# Patient Record
Sex: FEMALE | Race: WHITE | Employment: OTHER | URBAN - METROPOLITAN AREA
[De-identification: names, ages, dates, MRNs, and addresses within clinical notes are randomized per-mention and may not be internally consistent; named-entity substitution may affect disease eponyms.]

---

## 2017-11-29 ENCOUNTER — IP HISTORICAL/CONVERTED ENCOUNTER (OUTPATIENT)
Dept: OTHER | Age: 82
End: 2017-11-29

## 2018-02-13 ENCOUNTER — OP HISTORICAL/CONVERTED ENCOUNTER (OUTPATIENT)
Dept: OTHER | Age: 83
End: 2018-02-13

## 2018-08-30 ENCOUNTER — OP HISTORICAL/CONVERTED ENCOUNTER (OUTPATIENT)
Dept: OTHER | Age: 83
End: 2018-08-30

## 2018-09-21 ENCOUNTER — OP HISTORICAL/CONVERTED ENCOUNTER (OUTPATIENT)
Dept: OTHER | Age: 83
End: 2018-09-21

## 2018-10-27 ENCOUNTER — ED HISTORICAL/CONVERTED ENCOUNTER (OUTPATIENT)
Dept: OTHER | Age: 83
End: 2018-10-27

## 2019-08-27 ENCOUNTER — OP HISTORICAL/CONVERTED ENCOUNTER (OUTPATIENT)
Dept: OTHER | Age: 84
End: 2019-08-27

## 2019-11-25 ENCOUNTER — ED HISTORICAL/CONVERTED ENCOUNTER (OUTPATIENT)
Dept: OTHER | Age: 84
End: 2019-11-25

## 2021-04-20 ENCOUNTER — HOSPITAL ENCOUNTER (EMERGENCY)
Age: 86
Discharge: HOME | End: 2021-04-20
Attending: EMERGENCY MEDICINE
Payer: MEDICARE

## 2021-04-20 ENCOUNTER — APPOINTMENT (OUTPATIENT)
Dept: CT IMAGING | Age: 86
End: 2021-04-20
Attending: EMERGENCY MEDICINE
Payer: MEDICARE

## 2021-04-20 VITALS
SYSTOLIC BLOOD PRESSURE: 160 MMHG | WEIGHT: 120 LBS | DIASTOLIC BLOOD PRESSURE: 78 MMHG | TEMPERATURE: 97.7 F | RESPIRATION RATE: 16 BRPM | HEART RATE: 83 BPM | BODY MASS INDEX: 22.08 KG/M2 | HEIGHT: 62 IN | OXYGEN SATURATION: 99 %

## 2021-04-20 DIAGNOSIS — S09.90XA CLOSED HEAD INJURY, INITIAL ENCOUNTER: ICD-10-CM

## 2021-04-20 DIAGNOSIS — T07.XXXA ABRASIONS OF MULTIPLE SITES: ICD-10-CM

## 2021-04-20 DIAGNOSIS — T07.XXXA MULTIPLE CONTUSIONS: Primary | ICD-10-CM

## 2021-04-20 PROCEDURE — 90471 IMMUNIZATION ADMIN: CPT

## 2021-04-20 PROCEDURE — 70486 CT MAXILLOFACIAL W/O DYE: CPT

## 2021-04-20 PROCEDURE — 90715 TDAP VACCINE 7 YRS/> IM: CPT | Performed by: EMERGENCY MEDICINE

## 2021-04-20 PROCEDURE — 74011250637 HC RX REV CODE- 250/637: Performed by: EMERGENCY MEDICINE

## 2021-04-20 PROCEDURE — 99283 EMERGENCY DEPT VISIT LOW MDM: CPT

## 2021-04-20 PROCEDURE — 70450 CT HEAD/BRAIN W/O DYE: CPT

## 2021-04-20 PROCEDURE — 74011000250 HC RX REV CODE- 250: Performed by: EMERGENCY MEDICINE

## 2021-04-20 PROCEDURE — 74011250636 HC RX REV CODE- 250/636: Performed by: EMERGENCY MEDICINE

## 2021-04-20 RX ORDER — IBUPROFEN 400 MG/1
400 TABLET ORAL
Status: COMPLETED | OUTPATIENT
Start: 2021-04-21 | End: 2021-04-20

## 2021-04-20 RX ORDER — BACITRACIN 500 [USP'U]/G
OINTMENT TOPICAL
Status: COMPLETED | OUTPATIENT
Start: 2021-04-20 | End: 2021-04-20

## 2021-04-20 RX ADMIN — IBUPROFEN 400 MG: 400 TABLET, FILM COATED ORAL at 23:21

## 2021-04-20 RX ADMIN — TETANUS TOXOID, REDUCED DIPHTHERIA TOXOID AND ACELLULAR PERTUSSIS VACCINE, ADSORBED 0.5 ML: 5; 2.5; 8; 8; 2.5 SUSPENSION INTRAMUSCULAR at 22:52

## 2021-04-20 RX ADMIN — BACITRACIN: 500 OINTMENT TOPICAL at 23:21

## 2021-04-21 NOTE — DISCHARGE INSTRUCTIONS
Ibuprofen 400 mg every 8 hours as needed for pain. Clean wounds daily and apply antibiotic ointment.

## 2021-04-21 NOTE — ED TRIAGE NOTES
Pt states tripped over her shoes about 30 minutes ago and fell on her face. Pt has facial lacs on forehead and nose.  Pt denies LOC

## 2021-04-21 NOTE — ED PROVIDER NOTES
EMERGENCY DEPARTMENT HISTORY AND PHYSICAL EXAM      Date: 4/20/2021  Patient Name: Brandy Pritchett    History of Presenting Illness     Chief Complaint   Patient presents with   [facility] Laceration    Facial Laceration       History Provided By: Patient    HPI: Brandy Pritchett, 80 y.o. female with a past medical history significant hypertension, hyperlipidemia and hypothyroid presents to the ED with cc of Facial pain,swelling and bleeding after she fell at home  this evening onto pavement, approx 30 minutes PTA. She  Denies any LOC or  Any presyncopal sx. States she tripped due to ill fitting shoes. NO  Visual change,  Dizziness, neck or  Back pain. PCP: Ronald Martino MD    No current facility-administered medications on file prior to encounter. No current outpatient medications on file prior to encounter. Past History     Past Medical History: unsure  Of last tetanus booster date  Past Medical History:   Diagnosis Date    High cholesterol     Hypertension     Hypothyroidism        Past Surgical History:  Past Surgical History:   Procedure Laterality Date    HX BREAST LUMPECTOMY      HX HYSTERECTOMY         Family History:  History reviewed. No pertinent family history. Social History:  Social History     Tobacco Use    Smoking status: Former Smoker    Smokeless tobacco: Never Used   Substance Use Topics    Alcohol use: Not Currently    Drug use: Never       Allergies:  No Known Allergies      Review of Systems   Review of Systems   Eyes: Negative for pain. Cardiovascular: Negative for chest pain. Gastrointestinal: Negative for abdominal pain. Musculoskeletal: Negative for back pain, gait problem and neck pain. Neurological: Negative for dizziness, weakness and numbness. All other systems reviewed and are negative. Physical Exam   Physical Exam  Vitals signs and nursing note reviewed. Constitutional:       Appearance: She is well-developed.    HENT:      Head: Normocephalic. Right Ear: Tympanic membrane normal.      Left Ear: Tympanic membrane normal.      Nose:      Comments: Edema, superficial laceration on nasal  Bridge. Mouth/Throat:      Mouth: Mucous membranes are moist.      Pharynx: Oropharynx is clear. Eyes:      General: No scleral icterus. Extraocular Movements: Extraocular movements intact. Pupils: Pupils are equal, round, and reactive to light. Neck:      Musculoskeletal: Normal range of motion. No muscular tenderness. Comments: No midline  Spinal tenderness. Cardiovascular:      Rate and Rhythm: Normal rate and regular rhythm. Heart sounds: Normal heart sounds. Pulmonary:      Effort: Pulmonary effort is normal.      Breath sounds: Normal breath sounds. No wheezing, rhonchi or rales. Abdominal:      General: Bowel sounds are normal. There is no distension. Palpations: Abdomen is soft. Tenderness: There is no abdominal tenderness. There is no right CVA tenderness or left CVA tenderness. Musculoskeletal:         General: Swelling present. No deformity. Comments: No midline spinal tenderness. Edema and contusion to  Medial left knee but good ROM and nl weight bearing. Skin:     General: Skin is warm and dry. Capillary Refill: Capillary refill takes less than 2 seconds. Findings: No rash. Comments: Abrasions to forehead, nasal bridge,  Lateral right wrist.     Neurological:      General: No focal deficit present. Mental Status: She is alert and oriented to person, place, and time. Comments: No focal or lateralizing deficits. Psychiatric:         Mood and Affect: Mood normal.         Behavior: Behavior normal.         Diagnostic Study Results     Labs -   No results found for this or any previous visit (from the past 12 hour(s)). Radiologic Studies -   CT MAXILLOFACIAL WO CONT   Final Result      Subtle nasal fractures of indeterminate age, may be old. Dental disease. Follow-up as clinically indicated. CT HEAD WO CONT   Final Result   No acute intracranial findings. There is a small scalp hematoma involving the frontal region. No skull fracture. No intracranial hemorrhage. CT Results  (Last 48 hours)               04/20/21 2205  CT MAXILLOFACIAL WO CONT Final result    Impression:      Subtle nasal fractures of indeterminate age, may be old. Dental disease. Follow-up as clinically indicated. Narrative:  CT maxillofacial without contrast       Dose reduction: All CT scans at this facility are performed using dose reduction   optimization techniques as appropriate to a performed exam including the   following: Automated exposure control, adjustments of the mA and/or kV according   to patient size, or use of iterative reconstruction technique. Indication: Facial injury       Findings: There are artifacts from dental work. Subtle nasal fractures of indeterminate   age. No fluid in the paranasal sinuses. Bony orbits are intact. Temporomandibular joints are maintained. Degenerative changes cervical spine. Periapical infection/abscess involving a few teeth. Dental injuries would be   difficult to exclude on this study. Small right anterior frontal scalp   hematoma/laceration. Please note that this is not the optimal study for   evaluation of soft tissues. 04/20/21 2203  CT HEAD WO CONT Final result    Impression:  No acute intracranial findings. There is a small scalp hematoma involving the frontal region. No skull fracture. No intracranial hemorrhage. Narrative:  Noncontrast CT of brain :  Axial images with multiplanar reformats.        Dose Reduction:  All CT scans at this facility are performed using dose   reduction optimization techniques as appropriate to a performed exam including   the following: Automated exposure control, adjustments of the mA and/or kV   according to patient size, or use of iterative reconstruction technique. Comparison:  No priors are available. FINDINGS:   There is a small scalp hematoma in the frontal region. The posterior fossa is   normal, with normal shape and size of the fourth ventricle, the cerebellum and   brainstem. Supratentorially, the lateral and third ventricles are normal. The   extra-axial CSF spaces are normal for age. There is normal gray-white matter   differentiation. There is no evidence for midline shift, intracranial masses or   hemorrhage. No definite evidence for acute infarct. There is mild   periventricular white matter disease present indicating chronic small vessel   ischemia. No fractures. Clear sinuses. CXR Results  (Last 48 hours)    None            Medical Decision Making   I am the first provider for this patient. I reviewed the vital signs, available nursing notes, past medical history, past surgical history, family history and social history. Vital Signs-Reviewed the patient's vital signs. Patient Vitals for the past 12 hrs:   Temp Pulse Resp BP SpO2   04/20/21 2135 97.7 °F (36.5 °C) 83 16 (!) 160/78 99 %       Records Reviewed: Nursing Notes and Old Medical Records    Provider Notes (Medical Decision Making):   Diff Dx: contusion vs fracture vs ICH      ED Course:   Initial assessment performed. The patients presenting problems have been discussed, and they are in agreement with the care plan formulated and outlined with them. I have encouraged them to ask questions as they arise throughout their visit. CT scans of head, facial bones above. Significant only for contusions, likely prior nasal fx. Pt states she had similar fall one year ago bu no medical evaluation d/t Covid-19 Pandemic. Tetanus booster and ibuprofen given. Discussed wound care and pain mgmnt with otc meds,  PLAN:  1. There are no discharge medications for this patient.     2.   Follow-up Information    None       Return to ED if worse     Diagnosis     Clinical Impression:   1. Multiple contusions    2. Abrasions of multiple sites    3.  Closed head injury, initial encounter

## 2022-01-14 ENCOUNTER — TRANSCRIBE ORDER (OUTPATIENT)
Dept: SCHEDULING | Age: 87
End: 2022-01-14

## 2022-01-14 DIAGNOSIS — E04.1 THYROID NODULE: Primary | ICD-10-CM

## 2022-01-14 DIAGNOSIS — D58.0: ICD-10-CM

## 2022-04-12 ENCOUNTER — HOSPITAL ENCOUNTER (EMERGENCY)
Age: 87
Discharge: HOME | End: 2022-04-13
Payer: MEDICARE

## 2022-04-12 ENCOUNTER — APPOINTMENT (OUTPATIENT)
Dept: CT IMAGING | Age: 87
End: 2022-04-12
Attending: EMERGENCY MEDICINE
Payer: MEDICARE

## 2022-04-12 DIAGNOSIS — S01.01XA LACERATION OF SCALP, INITIAL ENCOUNTER: ICD-10-CM
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
## 2022-04-12 DIAGNOSIS — S09.90XA MINOR HEAD INJURY, INITIAL ENCOUNTER: Primary | ICD-10-CM

## 2022-04-12 LAB
ALBUMIN SERPL-MCNC: 4 G/DL (ref 3.5–5)
ALBUMIN/GLOB SERPL: 1.5 {RATIO} (ref 1.1–2.2)
ALP SERPL-CCNC: 169 U/L (ref 45–117)
ALT SERPL-CCNC: 33 U/L (ref 12–78)
ANION GAP SERPL CALC-SCNC: 3 MMOL/L (ref 5–15)
APPEARANCE UR: CLEAR
AST SERPL W P-5'-P-CCNC: 33 U/L (ref 15–37)
BACTERIA URNS QL MICRO: NEGATIVE /HPF
BASOPHILS # BLD: 0 K/UL (ref 0–0.1)
BASOPHILS NFR BLD: 0 % (ref 0–1)
BILIRUB SERPL-MCNC: 0.2 MG/DL (ref 0.2–1)
BILIRUB UR QL: NEGATIVE
BUN SERPL-MCNC: 21 MG/DL (ref 6–20)
BUN/CREAT SERPL: 29 (ref 12–20)
CA-I BLD-MCNC: 9.6 MG/DL (ref 8.5–10.1)
CHLORIDE SERPL-SCNC: 110 MMOL/L (ref 97–108)
CO2 SERPL-SCNC: 27 MMOL/L (ref 21–32)
COLOR UR: NORMAL
CREAT SERPL-MCNC: 0.72 MG/DL (ref 0.55–1.02)
DIFFERENTIAL METHOD BLD: NORMAL
EOSINOPHIL # BLD: 0.1 K/UL (ref 0–0.4)
EOSINOPHIL NFR BLD: 1 % (ref 0–7)
ERYTHROCYTE [DISTWIDTH] IN BLOOD BY AUTOMATED COUNT: 14.3 % (ref 11.5–14.5)
GLOBULIN SER CALC-MCNC: 2.7 G/DL (ref 2–4)
GLUCOSE SERPL-MCNC: 131 MG/DL (ref 65–100)
GLUCOSE UR STRIP.AUTO-MCNC: NEGATIVE MG/DL
HCT VFR BLD AUTO: 41.8 % (ref 35–47)
HGB BLD-MCNC: 13.3 G/DL (ref 11.5–16)
HGB UR QL STRIP: NEGATIVE
IMM GRANULOCYTES # BLD AUTO: 0 K/UL (ref 0–0.04)
IMM GRANULOCYTES NFR BLD AUTO: 0 % (ref 0–0.5)
KETONES UR QL STRIP.AUTO: NEGATIVE MG/DL
LEUKOCYTE ESTERASE UR QL STRIP.AUTO: NEGATIVE
LYMPHOCYTES # BLD: 1.4 K/UL (ref 0.8–3.5)
LYMPHOCYTES NFR BLD: 20 % (ref 12–49)
MCH RBC QN AUTO: 29.5 PG (ref 26–34)
MCHC RBC AUTO-ENTMCNC: 31.8 G/DL (ref 30–36.5)
MCV RBC AUTO: 92.7 FL (ref 80–99)
MONOCYTES # BLD: 0.6 K/UL (ref 0–1)
MONOCYTES NFR BLD: 8 % (ref 5–13)
NEUTS SEG # BLD: 4.9 K/UL (ref 1.8–8)
NEUTS SEG NFR BLD: 71 % (ref 32–75)
NITRITE UR QL STRIP.AUTO: NEGATIVE
NRBC # BLD: 0 K/UL (ref 0–0.01)
NRBC BLD-RTO: 0 PER 100 WBC
PH UR STRIP: 5 [PH] (ref 5–8)
PLATELET # BLD AUTO: 209 K/UL (ref 150–400)
PMV BLD AUTO: 10.5 FL (ref 8.9–12.9)
POTASSIUM SERPL-SCNC: 4.2 MMOL/L (ref 3.5–5.1)
PROT SERPL-MCNC: 6.7 G/DL (ref 6.4–8.2)
PROT UR STRIP-MCNC: NEGATIVE MG/DL
RBC # BLD AUTO: 4.51 M/UL (ref 3.8–5.2)
RBC #/AREA URNS HPF: NORMAL /HPF (ref 0–5)
SODIUM SERPL-SCNC: 140 MMOL/L (ref 136–145)
SP GR UR REFRACTOMETRY: 1.01 (ref 1–1.03)
TSH SERPL DL<=0.05 MIU/L-ACNC: 0.54 UIU/ML (ref 0.36–3.74)
UA: UC IF INDICATED,UAUC: NORMAL
UROBILINOGEN UR QL STRIP.AUTO: 0.1 EU/DL (ref 0.1–1)
WBC # BLD AUTO: 7 K/UL (ref 3.6–11)
WBC URNS QL MICRO: NORMAL /HPF (ref 0–4)

## 2022-04-12 PROCEDURE — 99284 EMERGENCY DEPT VISIT MOD MDM: CPT

## 2022-04-12 PROCEDURE — 75810000293 HC SIMP/SUPERF WND  RPR

## 2022-04-12 PROCEDURE — 90715 TDAP VACCINE 7 YRS/> IM: CPT | Performed by: NURSE PRACTITIONER

## 2022-04-12 PROCEDURE — 74011250636 HC RX REV CODE- 250/636: Performed by: NURSE PRACTITIONER

## 2022-04-12 PROCEDURE — 84443 ASSAY THYROID STIM HORMONE: CPT

## 2022-04-12 PROCEDURE — 75810000275 HC EMERGENCY DEPT VISIT NO LEVEL OF CARE

## 2022-04-12 PROCEDURE — 80053 COMPREHEN METABOLIC PANEL: CPT

## 2022-04-12 PROCEDURE — 70450 CT HEAD/BRAIN W/O DYE: CPT

## 2022-04-12 PROCEDURE — 93005 ELECTROCARDIOGRAM TRACING: CPT

## 2022-04-12 PROCEDURE — 36415 COLL VENOUS BLD VENIPUNCTURE: CPT

## 2022-04-12 PROCEDURE — 85025 COMPLETE CBC W/AUTO DIFF WBC: CPT

## 2022-04-12 PROCEDURE — 90471 IMMUNIZATION ADMIN: CPT

## 2022-04-12 PROCEDURE — 81001 URINALYSIS AUTO W/SCOPE: CPT

## 2022-04-12 PROCEDURE — 74011250637 HC RX REV CODE- 250/637: Performed by: NURSE PRACTITIONER

## 2022-04-12 RX ORDER — ACETAMINOPHEN 325 MG/1
650 TABLET ORAL
Status: COMPLETED | OUTPATIENT
Start: 2022-04-12 | End: 2022-04-12

## 2022-04-12 RX ORDER — TRAMADOL HYDROCHLORIDE 50 MG/1
50 TABLET ORAL
Status: COMPLETED | OUTPATIENT
Start: 2022-04-12 | End: 2022-04-12

## 2022-04-12 RX ORDER — TIMOLOL MALEATE 5 MG/ML
1 SOLUTION/ DROPS OPHTHALMIC DAILY
COMMUNITY

## 2022-04-12 RX ORDER — ACETAMINOPHEN 500 MG
2000 TABLET ORAL DAILY
COMMUNITY

## 2022-04-12 RX ORDER — ATORVASTATIN CALCIUM 20 MG/1
20 TABLET, FILM COATED ORAL DAILY
COMMUNITY
Start: 2022-01-29

## 2022-04-12 RX ORDER — AMLODIPINE BESYLATE 5 MG/1
5 TABLET ORAL DAILY
COMMUNITY
Start: 2022-04-10

## 2022-04-12 RX ORDER — BACITRACIN ZINC 500 [USP'U]/G
1 CREAM TOPICAL
Status: DISCONTINUED | OUTPATIENT
Start: 2022-04-12 | End: 2022-04-13 | Stop reason: HOSPADM

## 2022-04-12 RX ORDER — LATANOPROST 50 UG/ML
1 SOLUTION/ DROPS OPHTHALMIC
COMMUNITY
Start: 2022-02-24

## 2022-04-12 RX ORDER — RAMIPRIL 1.25 MG/1
1.25 CAPSULE ORAL DAILY
COMMUNITY
Start: 2022-01-26

## 2022-04-12 RX ORDER — LEVOTHYROXINE SODIUM 150 MCG
150 TABLET ORAL
COMMUNITY
Start: 2022-03-07

## 2022-04-12 RX ORDER — LANOLIN ALCOHOL/MO/W.PET/CERES
1000 CREAM (GRAM) TOPICAL DAILY
COMMUNITY

## 2022-04-12 RX ADMIN — SODIUM CHLORIDE 500 ML: 9 INJECTION, SOLUTION INTRAVENOUS at 21:24

## 2022-04-12 RX ADMIN — TETANUS TOXOID, REDUCED DIPHTHERIA TOXOID AND ACELLULAR PERTUSSIS VACCINE, ADSORBED 0.5 ML: 5; 2.5; 8; 8; 2.5 SUSPENSION INTRAMUSCULAR at 20:10

## 2022-04-12 RX ADMIN — TRAMADOL HYDROCHLORIDE 50 MG: 50 TABLET ORAL at 22:10

## 2022-04-12 RX ADMIN — ACETAMINOPHEN 650 MG: 325 TABLET ORAL at 20:09

## 2022-04-12 NOTE — ED TRIAGE NOTES
[facility] arrives with patient with head lac after a fall from home  Patient denies LOC and is not on blood thinners. She lives alone at home and states she does not know why she fell.    Pt is alert and oriented upon arrival and denies any headache, nausea or vomiting post fall/head lac

## 2022-04-13 VITALS
HEIGHT: 61 IN | RESPIRATION RATE: 16 BRPM | DIASTOLIC BLOOD PRESSURE: 72 MMHG | WEIGHT: 119.93 LBS | SYSTOLIC BLOOD PRESSURE: 120 MMHG | BODY MASS INDEX: 22.64 KG/M2 | HEART RATE: 70 BPM | TEMPERATURE: 98.7 F | OXYGEN SATURATION: 100 %

## 2022-04-13 LAB
ATRIAL RATE: 71 BPM
CALCULATED P AXIS, ECG09: 52 DEGREES
CALCULATED R AXIS, ECG10: 42 DEGREES
CALCULATED T AXIS, ECG11: 51 DEGREES
DIAGNOSIS, 93000: NORMAL
P-R INTERVAL, ECG05: 152 MS
Q-T INTERVAL, ECG07: 398 MS
QRS DURATION, ECG06: 68 MS
QTC CALCULATION (BEZET), ECG08: 432 MS
VENTRICULAR RATE, ECG03: 71 BPM

## 2022-04-13 NOTE — ED PROVIDER NOTES
EMERGENCY DEPARTMENT HISTORY AND PHYSICAL EXAM      Date: 4/12/2022  Patient Name: Mao Patterson    History of Presenting Illness     Chief Complaint   Patient presents with    Head Injury       History Provided By: Patient    HPI: Mao Patterson, 80 y.o. female with a past medical history significant hypertension, hyperlipidemia and hypothyroidism presents to the ED with cc of fall. Patient states she was moving things around at her house preparing for a gathering tomorrow. She states the last thing she remembers is being upstairs. She states she thinks she walked across the street to get some help from her neighbor but is unsure. She presents with a large laceration to her posterior scalp. She reports feeling more fatigued than normal recently but denies any recent illness, specifically denies any fevers or NV/D. She denies any headache, dizziness or visual changes presently. She denies any neck pain or any other injuries. Last Tdap unknown. There are no other complaints, changes, or physical findings at this time. PCP: Chris Royal MD    No current facility-administered medications on file prior to encounter. Current Outpatient Medications on File Prior to Encounter   Medication Sig Dispense Refill    amLODIPine (NORVASC) 5 mg tablet Take 5 mg by mouth daily.  atorvastatin (LIPITOR) 20 mg tablet Take 20 mg by mouth daily.  ramipriL (ALTACE) 1.25 mg capsule Take 1.25 mg by mouth daily.  latanoprost (XALATAN) 0.005 % ophthalmic solution Administer 1 Drop to both eyes nightly.  timolol (TIMOPTIC) 0.5 % ophthalmic solution Administer 1 Drop to both eyes daily.  Synthroid 150 mcg tablet Take 150 mcg by mouth every morning.  cyanocobalamin 1,000 mcg tablet Take 1,000 mcg by mouth daily.  cholecalciferol (VITAMIN D3) (2,000 UNITS /50 MCG) cap capsule Take 2,000 Units by mouth daily.          Past History     Past Medical History:  Past Medical History:   Diagnosis Date    High cholesterol     Hypertension     Hypothyroidism        Past Surgical History:  Past Surgical History:   Procedure Laterality Date    HX BREAST LUMPECTOMY      HX HYSTERECTOMY         Family History:  No family history on file. Social History:  Social History     Tobacco Use    Smoking status: Former Smoker    Smokeless tobacco: Never Used   Substance Use Topics    Alcohol use: Not Currently    Drug use: Never       Allergies:  No Known Allergies      Review of Systems     Review of Systems   Unable to perform ROS: Mental status change       Physical Exam     Physical Exam  Vitals and nursing note reviewed. Constitutional:       General: She is not in acute distress. Appearance: Normal appearance. HENT:      Head: Normocephalic. Laceration present. Jaw: There is normal jaw occlusion. Comments: Large gaping laceration posterior scalp, small linear laceration right scalp  Eyes:      General: Vision grossly intact. Extraocular Movements: Extraocular movements intact. Conjunctiva/sclera: Conjunctivae normal.      Pupils: Pupils are equal, round, and reactive to light. Cardiovascular:      Rate and Rhythm: Normal rate and regular rhythm. Heart sounds: Normal heart sounds. Pulmonary:      Effort: Pulmonary effort is normal. No tachypnea or accessory muscle usage. Breath sounds: Decreased breath sounds present. No wheezing or rales. Chest:      Chest wall: No tenderness or crepitus. Abdominal:      General: Bowel sounds are normal.      Palpations: Abdomen is soft. Tenderness: There is no abdominal tenderness. Musculoskeletal:         General: Normal range of motion. Cervical back: Normal range of motion and neck supple. No rigidity or crepitus. No pain with movement or spinous process tenderness. Normal range of motion. Skin:     General: Skin is warm and dry. Neurological:      General: No focal deficit present.       Mental Status: She is alert. GCS: GCS eye subscore is 4. GCS verbal subscore is 5. GCS motor subscore is 6. Cranial Nerves: Cranial nerves are intact. Sensory: Sensation is intact. Motor: Motor function is intact. Coordination: Coordination is intact. Gait: Gait is intact. Psychiatric:         Mood and Affect: Mood is anxious. Behavior: Behavior normal. Behavior is cooperative. Lab and Diagnostic Study Results     Labs -     Admission on 04/12/2022, Discharged on 04/13/2022   Component Date Value    WBC 04/12/2022 7.0     RBC 04/12/2022 4.51     HGB 04/12/2022 13.3     HCT 04/12/2022 41.8     MCV 04/12/2022 92.7     MCH 04/12/2022 29.5     MCHC 04/12/2022 31.8     RDW 04/12/2022 14.3     PLATELET 23/42/7863 173     MPV 04/12/2022 10.5     NRBC 04/12/2022 0.0     ABSOLUTE NRBC 04/12/2022 0.00     NEUTROPHILS 04/12/2022 71     LYMPHOCYTES 04/12/2022 20     MONOCYTES 04/12/2022 8     EOSINOPHILS 04/12/2022 1     BASOPHILS 04/12/2022 0     IMMATURE GRANULOCYTES 04/12/2022 0     ABS. NEUTROPHILS 04/12/2022 4.9     ABS. LYMPHOCYTES 04/12/2022 1.4     ABS. MONOCYTES 04/12/2022 0.6     ABS. EOSINOPHILS 04/12/2022 0.1     ABS. BASOPHILS 04/12/2022 0.0     ABS. IMM. GRANS. 04/12/2022 0.0     DF 04/12/2022 AUTOMATED     Sodium 04/12/2022 140     Potassium 04/12/2022 4.2     Chloride 04/12/2022 110*    CO2 04/12/2022 27     Anion gap 04/12/2022 3*    Glucose 04/12/2022 131*    BUN 04/12/2022 21*    Creatinine 04/12/2022 0.72     BUN/Creatinine ratio 04/12/2022 29*    GFR est AA 04/12/2022 >60     GFR est non-AA 04/12/2022 >60     Calcium 04/12/2022 9.6     Bilirubin, total 04/12/2022 0.2     AST (SGOT) 04/12/2022 33     ALT (SGPT) 04/12/2022 33     Alk.  phosphatase 04/12/2022 169*    Protein, total 04/12/2022 6.7     Albumin 04/12/2022 4.0     Globulin 04/12/2022 2.7     A-G Ratio 04/12/2022 1.5     Color 04/12/2022 Yellow/Straw     Appearance 04/12/2022 Clear     Specific gravity 04/12/2022 1.013     pH (UA) 04/12/2022 5.0     Protein 04/12/2022 Negative     Glucose 04/12/2022 Negative     Ketone 04/12/2022 Negative     Bilirubin 04/12/2022 Negative     Blood 04/12/2022 Negative     Urobilinogen 04/12/2022 0.1     Nitrites 04/12/2022 Negative     Leukocyte Esterase 04/12/2022 Negative     UA:UC IF INDICATED 04/12/2022 Culture not indicated by UA result     WBC 04/12/2022 0-4     RBC 04/12/2022 0-5     Bacteria 04/12/2022 Negative     TSH 04/12/2022 0.54     Ventricular Rate 04/12/2022 71     Atrial Rate 04/12/2022 71     P-R Interval 04/12/2022 152     QRS Duration 04/12/2022 68     Q-T Interval 04/12/2022 398     QTC Calculation (Bezet) 04/12/2022 432     Calculated P Axis 04/12/2022 52     Calculated R Axis 04/12/2022 42     Calculated T Axis 04/12/2022 51     Diagnosis 04/12/2022                      Value:Sinus rhythm with Premature atrial complexes  Nonspecific ST abnormality  Abnormal ECG  No previous ECGs available  Confirmed by BG RAMIREZ, Volodymyr Dontrell ([id number]) on 4/13/2022 8:43:28 AM           Radiologic Studies -   CT Results (most recent):  Results from Hospital Encounter encounter on 04/12/22    CT HEAD WO CONT    Narrative  The study is a noncontrasted head CT examination dated 4/12/2022. HISTORY: Injury. TECHNIQUE: Thin section axial imaging was performed followed by sagittal and  coronal reconstructed imaging. Dose Reduction Technique was employed to reduce radiation exposure - This  includes reduction optimization techniques as appropriate to a performed exam  with automated exposure control adjustments of the mA and/or Kv according to  patient size, or use of iterative reconstruction technique. COMPARISON: None. The ventricles are normal in size with a midline position. This examination is  negative for intracranial hemorrhage, mass effect or an extra axial collection.   The gray-white matter junctions are preserved without cytotoxic or vasogenic  edema. There are age-appropriate involutional changes. An assessment of the white matter tracts demonstrates a mild decrease in the  central periventricular white matter consistent with aging/mild microvascular  changes. ORBITS: The patient has undergone previous cataract surgery. This examination is  negative for acute orbital pathology. PARANASAL SINUSES: The paranasal sinuses are well pneumatized without acute  sinus disease. The mastoid air cells and middle ear cavities image in a normal fashion. The  sella and the suprasellar regions are unremarkable. The craniocervical junction  images in a normal fashion. OTHER: The bony calvarium is intact. Impression  The CT evaluation is within normal limits for the patient's age with  age-appropriate involutional changes and mild microvascular disease. This  examination is negative for intracranial hemorrhage, an extra-axial collection,  or cerebral edema. The bony calvarium images in a normal fashion. Medical Decision Making   - I am the first provider for this patient. - I reviewed the vital signs, available nursing notes, past medical history, past surgical history, family history and social history. - Initial assessment performed. The patients presenting problems have been discussed, and they are in agreement with the care plan formulated and outlined with them. I have encouraged them to ask questions as they arise throughout their visit. Vital Signs-Reviewed the patient's vital signs. No data found. Records Reviewed: Nursing Notes    The patient presents with head injury with a differential diagnosis of concussion, subarachnoid hemorrhage,   Epidural/subdural hematoma, skull fracture, diffuse axonal injury      ED Course:   Patient presents with scalp laceration after unwitnessed fall at home. Patient unsure of details surrounding event. She denies recent illness.   Patient is A&O upon arrival, no focal deficits. Patient is nontoxic-appearing, vital signs normal.  CT head without acute findings. Labs unremarkable. EKG normal sinus rhythm with ischemia. Scalp laceration cleaned and repaired without difficulty. Patient is adamant about going home and has a friend at bedside who is able to help patient and look after her. Patient is able to ambulate without difficulty at time of discharge. ED Course as of 04/18/22 2213   Tue Apr 12, 2022 2113 Patient updated on results and plan of care thus far [LP]   2145 Patient updated on results and plan of care. She is anxious to go home and adamant that she is safe and capable. She has a friend at the bedside who states she will assist her at home and monitor. [LP]      ED Course User Index  [LP] Craig Rendon NP       Provider Notes (Medical Decision Making):     MDM  Number of Diagnoses or Management Options  Fall, initial encounter: new, needed workup  Laceration of scalp, initial encounter: new, no workup  Minor head injury, initial encounter: new, needed workup     Amount and/or Complexity of Data Reviewed  Clinical lab tests: ordered and reviewed  Tests in the radiology section of CPT®: ordered and reviewed  Review and summarize past medical records: yes  Discuss the patient with other providers: yes    Risk of Complications, Morbidity, and/or Mortality  Presenting problems: high  Diagnostic procedures: moderate  Management options: moderate    Patient Progress  Patient progress: stable         Procedures   Medical Decision Makingedical Decision Making  Performed by: Sameer Driscoll NP  PROCEDURES:  Wound Repair    Date/Time: 4/12/2022 10:15 PM  Performed by: NPPreparation: skin prepped with Shur-Clens and sterile field established  Pre-procedure re-eval: Immediately prior to the procedure, the patient was reevaluated and found suitable for the planned procedure and any planned medications.   Time out: Immediately prior to the procedure a time out was called to verify the correct patient, procedure, equipment, staff and marking as appropriate. .  Location details: scalp  Wound length:2.6 - 7.5 cm  Foreign bodies: unknown  Irrigation solution: saline  Irrigation method: jet lavage  Debridement: none  Skin closure: staples  Number of sutures: 4 staples. Approximation: close  Dressing: antibiotic ointment  Patient tolerance: patient tolerated the procedure well with no immediate complications  My total time at bedside, performing this procedure was 16-30 minutes. Disposition   Disposition: Condition stable  DC- Adult Discharges: All of the diagnostic tests were reviewed and questions answered. Diagnosis, care plan and treatment options were discussed. The patient understands the instructions and will follow up as directed. The patients results have been reviewed with them. They have been counseled regarding their diagnosis. The patient and friend verbally convey understanding and agreement of the signs, symptoms, diagnosis, treatment and prognosis and additionally agrees to follow up as recommended with their PCP in 24 - 48 hours. They also agree with the care-plan and convey that all of their questions have been answered. I have also put together some discharge instructions for them that include: 1) educational information regarding their diagnosis, 2) how to care for their diagnosis at home, as well a 3) list of reasons why they would want to return to the ED prior to their follow-up appointment, should their condition change. DC-The patient and friend was given verbal follow-up, headache warning signs and and wound care  instructions  DC- Pain Control DC Home plan: Tylenol, Referral Family Medicine/PCP and Advised to return for signs of head injury, weakness, numbness or tingling to extremities, incontinence    Discharged    DISCHARGE PLAN:  1.    Current Discharge Medication List      CONTINUE these medications which have NOT CHANGED Details   amLODIPine (NORVASC) 5 mg tablet Take 5 mg by mouth daily. atorvastatin (LIPITOR) 20 mg tablet Take 20 mg by mouth daily. ramipriL (ALTACE) 1.25 mg capsule Take 1.25 mg by mouth daily. latanoprost (XALATAN) 0.005 % ophthalmic solution Administer 1 Drop to both eyes nightly. timolol (TIMOPTIC) 0.5 % ophthalmic solution Administer 1 Drop to both eyes daily. Synthroid 150 mcg tablet Take 150 mcg by mouth every morning. cyanocobalamin 1,000 mcg tablet Take 1,000 mcg by mouth daily. cholecalciferol (VITAMIN D3) (2,000 UNITS /50 MCG) cap capsule Take 2,000 Units by mouth daily. 2.   Follow-up Information     Follow up With Specialties Details Why Contact Info    Laurel Jarvis MD Family Medicine Go to  7-10 days for staple removal [address]  [phone]          3. Return to ED if worse   4. Discharge Medication List as of 4/12/2022 11:43 PM      OTC tylenol as needed      Diagnosis     Clinical Impression:   1. Minor head injury, initial encounter    2. Laceration of scalp, initial encounter    3. Fall, initial encounter        Attestations:    Renea Rashid NP    Please note that this dictation was completed with [employer], the computer voice recognition software. Quite often unanticipated grammatical, syntax, homophones, and other interpretive errors are inadvertently transcribed by the computer software. Please disregard these errors. Please excuse any errors that have escaped final proofreading. Thank you.

## 2022-04-13 NOTE — DISCHARGE INSTRUCTIONS
Thank you! Thank you for allowing me to care for you in the emergency department. I sincerely hope that you are satisfied with your visit today. It is my goal to provide you with excellent care. Below you will find a list of your labs and imaging from your visit today. Should you have any questions regarding these results please do not hesitate to call the emergency department. Labs -     Recent Results (from the past 12 hour(s))   URINALYSIS W/ REFLEX CULTURE    Collection Time: 04/12/22  7:15 PM    Specimen: Urine   Result Value Ref Range    Color Yellow/Straw      Appearance Clear Clear      Specific gravity 1.013 1.003 - 1.030      pH (UA) 5.0 5.0 - 8.0      Protein Negative Negative mg/dL    Glucose Negative Negative mg/dL    Ketone Negative Negative mg/dL    Bilirubin Negative Negative      Blood Negative Negative      Urobilinogen 0.1 0.1 - 1.0 EU/dL    Nitrites Negative Negative      Leukocyte Esterase Negative Negative      UA:UC IF INDICATED Culture not indicated by UA result Culture not indicated by UA result      WBC 0-4 0 - 4 /hpf    RBC 0-5 0 - 5 /hpf    Bacteria Negative Negative /hpf   CBC WITH AUTOMATED DIFF    Collection Time: 04/12/22  7:23 PM   Result Value Ref Range    WBC 7.0 3.6 - 11.0 K/uL    RBC 4.51 3.80 - 5.20 M/uL    HGB 13.3 11.5 - 16.0 g/dL    HCT 41.8 35.0 - 47.0 %    MCV 92.7 80.0 - 99.0 FL    MCH 29.5 26.0 - 34.0 PG    MCHC 31.8 30.0 - 36.5 g/dL    RDW 14.3 11.5 - 14.5 %    PLATELET 242 099 - 586 K/uL    MPV 10.5 8.9 - 12.9 FL    NRBC 0.0 0.0  WBC    ABSOLUTE NRBC 0.00 0.00 - 0.01 K/uL    NEUTROPHILS 71 32 - 75 %    LYMPHOCYTES 20 12 - 49 %    MONOCYTES 8 5 - 13 %    EOSINOPHILS 1 0 - 7 %    BASOPHILS 0 0 - 1 %    IMMATURE GRANULOCYTES 0 0 - 0.5 %    ABS. NEUTROPHILS 4.9 1.8 - 8.0 K/UL    ABS. LYMPHOCYTES 1.4 0.8 - 3.5 K/UL    ABS. MONOCYTES 0.6 0.0 - 1.0 K/UL    ABS. EOSINOPHILS 0.1 0.0 - 0.4 K/UL    ABS. BASOPHILS 0.0 0.0 - 0.1 K/UL    ABS. IMM.  GRANS. 0.0 0.00 - 0.04 K/UL    DF AUTOMATED     METABOLIC PANEL, COMPREHENSIVE    Collection Time: 04/12/22  7:23 PM   Result Value Ref Range    Sodium 140 136 - 145 mmol/L    Potassium 4.2 3.5 - 5.1 mmol/L    Chloride 110 (H) 97 - 108 mmol/L    CO2 27 21 - 32 mmol/L    Anion gap 3 (L) 5 - 15 mmol/L    Glucose 131 (H) 65 - 100 mg/dL    BUN 21 (H) 6 - 20 mg/dL    Creatinine 0.72 0.55 - 1.02 mg/dL    BUN/Creatinine ratio 29 (H) 12 - 20      GFR est AA >60 >60 ml/min/1.73m2    GFR est non-AA >60 >60 ml/min/1.73m2    Calcium 9.6 8.5 - 10.1 mg/dL    Bilirubin, total 0.2 0.2 - 1.0 mg/dL    AST (SGOT) 33 15 - 37 U/L    ALT (SGPT) 33 12 - 78 U/L    Alk. phosphatase 169 (H) 45 - 117 U/L    Protein, total 6.7 6.4 - 8.2 g/dL    Albumin 4.0 3.5 - 5.0 g/dL    Globulin 2.7 2.0 - 4.0 g/dL    A-G Ratio 1.5 1.1 - 2.2     TSH 3RD GENERATION    Collection Time: 04/12/22  7:23 PM   Result Value Ref Range    TSH 0.54 0.36 - 3.74 uIU/mL       Radiologic Studies -   CT HEAD WO CONT   Final Result   The CT evaluation is within normal limits for the patient's age with   age-appropriate involutional changes and mild microvascular disease. This   examination is negative for intracranial hemorrhage, an extra-axial collection,   or cerebral edema. The bony calvarium images in a normal fashion. CT Results  (Last 48 hours)                 04/12/22 2109  CT HEAD WO CONT Final result    Impression:  The CT evaluation is within normal limits for the patient's age with   age-appropriate involutional changes and mild microvascular disease. This   examination is negative for intracranial hemorrhage, an extra-axial collection,   or cerebral edema. The bony calvarium images in a normal fashion. Narrative: The study is a noncontrasted head CT examination dated 4/12/2022. HISTORY: Injury. TECHNIQUE: Thin section axial imaging was performed followed by sagittal and   coronal reconstructed imaging.        Dose Reduction Technique was employed to reduce radiation exposure - This   includes reduction optimization techniques as appropriate to a performed exam   with automated exposure control adjustments of the mA and/or Kv according to   patient size, or use of iterative reconstruction technique. COMPARISON: None. The ventricles are normal in size with a midline position. This examination is   negative for intracranial hemorrhage, mass effect or an extra axial collection. The gray-white matter junctions are preserved without cytotoxic or vasogenic   edema. There are age-appropriate involutional changes. An assessment of the white matter tracts demonstrates a mild decrease in the   central periventricular white matter consistent with aging/mild microvascular   changes. ORBITS: The patient has undergone previous cataract surgery. This examination is   negative for acute orbital pathology. PARANASAL SINUSES: The paranasal sinuses are well pneumatized without acute   sinus disease. The mastoid air cells and middle ear cavities image in a normal fashion. The   sella and the suprasellar regions are unremarkable. The craniocervical junction   images in a normal fashion. OTHER: The bony calvarium is intact. CXR Results  (Last 48 hours)      None               If you feel that you have not received excellent quality care or timely care, please ask to speak to the nurse manager. Please choose us in the future for your continued health care needs. ------------------------------------------------------------------------------------------------------------  The exam and treatment you received in the Emergency Department were for an urgent problem and are not intended as complete care.  It is important that you follow-up with a doctor, nurse practitioner, or physician assistant to:  (1) confirm your diagnosis,  (2) re-evaluation of changes in your illness and treatment, and  (3) for ongoing care.  If your symptoms become worse or you do not improve as expected and you are unable to reach your usual health care provider, you should return to the Emergency Department. We are available 24 hours a day. Please take your discharge instructions with you when you go to your follow-up appointment. If you have any problem arranging a follow-up appointment, contact the Emergency Department immediately. If a prescription has been provided, please have it filled as soon as possible to prevent a delay in treatment. Read the entire medication instruction sheet provided to you by the pharmacy. If you have any questions or reservations about taking the medication due to side effects or interactions with other medications, please call your primary care physician or contact the ER to speak with the charge nurse. Make an appointment with your family doctor or the physician you were referred to for follow-up of this visit as instructed on your discharge paperwork, as this is a mandatory follow-up. Return to the ER if you are unable to be seen or if you are unable to be seen in a timely manner. If you have any problem arranging the follow-up visit, contact the Emergency Department immediately.

## 2022-04-13 NOTE — ED NOTES
D/c paperwork discussed w/ and given to patient and friend, both verbalized understanding.  Able to transfer to wheelchair w/out assistance, taken to entrance w/ friend

## 2023-01-19 ENCOUNTER — TRANSCRIBE ORDER (OUTPATIENT)
Dept: SCHEDULING | Age: 88
End: 2023-01-19

## 2023-01-19 DIAGNOSIS — I82.412 ACUTE DEEP VEIN THROMBOSIS OF LEFT FEMORAL VEIN (HCC): Primary | ICD-10-CM

## 2023-01-20 ENCOUNTER — HOSPITAL ENCOUNTER (OUTPATIENT)
Dept: NON INVASIVE DIAGNOSTICS | Age: 88
Discharge: HOME | End: 2023-01-20
Attending: INTERNAL MEDICINE
Payer: MEDICARE

## 2023-01-20 DIAGNOSIS — I82.412 ACUTE DEEP VEIN THROMBOSIS OF LEFT FEMORAL VEIN (HCC): ICD-10-CM

## 2023-01-20 LAB — LEFT GSV THIGH DIST RFX: 1.6 S

## 2023-01-20 PROCEDURE — 93970 EXTREMITY STUDY: CPT

## 2023-04-03 ENCOUNTER — HOSPITAL ENCOUNTER (OUTPATIENT)
Dept: WOUND CARE | Age: 88
End: 2023-04-03
Payer: MEDICARE

## 2023-04-03 DIAGNOSIS — S91.001A ANKLE WOUND, RIGHT, INITIAL ENCOUNTER: Primary | ICD-10-CM

## 2023-04-03 PROCEDURE — 11042 DBRDMT SUBQ TIS 1ST 20SQCM/<: CPT

## 2023-04-03 PROCEDURE — 74011000250 HC RX REV CODE- 250: Performed by: SPECIALIST

## 2023-04-03 PROCEDURE — 99214 OFFICE O/P EST MOD 30 MIN: CPT

## 2023-04-03 RX ORDER — TRIAMCINOLONE ACETONIDE 1 MG/G
OINTMENT TOPICAL ONCE
OUTPATIENT
Start: 2023-04-03 | End: 2023-04-03

## 2023-04-03 RX ORDER — TRIAMCINOLONE ACETONIDE 1 MG/G
OINTMENT TOPICAL ONCE
Status: CANCELLED | OUTPATIENT
Start: 2023-04-03 | End: 2023-04-03

## 2023-04-03 RX ORDER — MUPIROCIN 20 MG/G
OINTMENT TOPICAL ONCE
OUTPATIENT
Start: 2023-04-03 | End: 2023-04-03

## 2023-04-03 RX ORDER — LIDOCAINE HYDROCHLORIDE 20 MG/ML
15 SOLUTION OROPHARYNGEAL ONCE
OUTPATIENT
Start: 2023-04-03 | End: 2023-04-03

## 2023-04-03 RX ORDER — CEPHALEXIN 500 MG/1
500 CAPSULE ORAL 3 TIMES DAILY
COMMUNITY

## 2023-04-03 RX ORDER — LIDOCAINE HYDROCHLORIDE 10 MG/ML
5 INJECTION INFILTRATION; PERINEURAL ONCE
Status: DISCONTINUED
Start: 2023-04-03 | End: 2023-04-04 | Stop reason: HOSPADM

## 2023-04-03 RX ORDER — LIDOCAINE HYDROCHLORIDE 20 MG/ML
15 SOLUTION OROPHARYNGEAL AS NEEDED
Status: DISCONTINUED
Start: 2023-04-03 | End: 2023-04-05 | Stop reason: HOSPADM

## 2023-04-03 RX ORDER — SENNOSIDES 8.6 MG/1
1 TABLET ORAL DAILY
COMMUNITY

## 2023-04-03 RX ORDER — SILVER SULFADIAZINE 10 G/1000G
CREAM TOPICAL ONCE
OUTPATIENT
Start: 2023-04-03 | End: 2023-04-03

## 2023-04-03 RX ORDER — MUPIROCIN 20 MG/G
OINTMENT TOPICAL ONCE
Status: CANCELLED | OUTPATIENT
Start: 2023-04-03 | End: 2023-04-03

## 2023-04-03 RX ORDER — LIDOCAINE HYDROCHLORIDE 20 MG/ML
15 SOLUTION OROPHARYNGEAL ONCE
Status: CANCELLED | OUTPATIENT
Start: 2023-04-03 | End: 2023-04-03

## 2023-04-03 RX ORDER — SILVER SULFADIAZINE 10 G/1000G
CREAM TOPICAL ONCE
Status: CANCELLED | OUTPATIENT
Start: 2023-04-03 | End: 2023-04-03

## 2023-04-03 RX ORDER — OMADACYCLINE 150 MG/1
150 TABLET, FILM COATED ORAL
COMMUNITY

## 2023-04-03 NOTE — WOUND CARE
[address]   Progress Note and Procedure Note     [address] RECORD NUMBER:  [id number]  AGE: 80 y.o. RACE WHITE/NON-  GENDER: female  : 1931  EPISODE DATE:  4/3/2023    Subjective:   26-year-old nondiabetic female presents with a long history of an ulcer on the medial left ankle. She is followed by Dr. Teresa Hernandez, and is undergone multiple injections of the veins in her left leg. She had a study in January to rule out DVT in the left lower extremity, which was negative. There was no commentary on the presence or absence of venous reflux on that study. Chief Complaint   Patient presents with    Wound Check     L ankle         HISTORY of PRESENT ILLNESS HPI    Donovan Vazquez is a 80 y.o. female who presents today for wound/ulcer evaluation. History of Wound Context: L aankle  Wound/Ulcer Pain Timing/Severity: moderate  Quality of pain: sharp  Severity:  3 / 10   Modifying Factors: None  Associated Signs/Symptoms: none    Ulcer Identification:  Ulcer Type: venous    Contributing Factors: venous stasis    Wound: L ankle         PAST MEDICAL HISTORY    Past Medical History:   Diagnosis Date    High cholesterol     Hypertension     Hypothyroidism         PAST SURGICAL HISTORY    Past Surgical History:   Procedure Laterality Date    HX BREAST LUMPECTOMY      HX HYSTERECTOMY         FAMILY HISTORY    Family History   Problem Relation Age of Onset    Heart Disease Mother     Cancer Father        SOCIAL HISTORY    Social History     Tobacco Use    Smoking status: Former    Smokeless tobacco: Never   Vaping Use    Vaping Use: Never used   Substance Use Topics    Alcohol use: Not Currently    Drug use: Never       ALLERGIES    No Known Allergies    MEDICATIONS    Current Outpatient Medications on File Prior to Encounter   Medication Sig Dispense Refill    Senna 8.6 mg tablet Take 1 Tablet by mouth daily.       cephALEXin (KEFLEX) 500 mg capsule Take 500 mg by mouth three (3) times daily. amLODIPine (NORVASC) 5 mg tablet Take 5 mg by mouth daily. atorvastatin (LIPITOR) 20 mg tablet Take 20 mg by mouth daily. ramipriL (ALTACE) 1.25 mg capsule Take 1.25 mg by mouth daily. latanoprost (XALATAN) 0.005 % ophthalmic solution Administer 1 Drop to both eyes nightly. timolol (TIMOPTIC) 0.5 % ophthalmic solution Administer 1 Drop to both eyes daily. Synthroid 150 mcg tablet Take 150 mcg by mouth every morning. cyanocobalamin 1,000 mcg tablet Take 1,000 mcg by mouth daily. cholecalciferol (VITAMIN D3) (2,000 UNITS /50 MCG) cap capsule Take 2,000 Units by mouth daily. omadacycline (Nuzyra) 150 mg tab Take 150 mg by mouth. (Patient not taking: Reported on 4/3/2023)       No current facility-administered medications on file prior to encounter. REVIEW OF SYSTEMS    Pertinent items are noted in HPI. Objective:     Visit Vitals  BP (!) 160/70 (BP 1 Location: Right upper arm, BP Patient Position: At rest;Sitting)   Pulse 78   Temp 97.8 °F (36.6 °C)   Resp 18   Ht 5' 2\" (1.575 m)   Wt 53.5 kg (118 lb)   BMI 21.58 kg/m²       Wt Readings from Last 3 Encounters:   04/03/23 53.5 kg (118 lb)   04/12/22 54.4 kg (119 lb 14.9 oz)   04/20/21 54.4 kg (120 lb)       PHYSICAL EXAM  On the left medial ankle there is a small wound, exquisitely tender to touch. There is infiltrated with 1% lidocaine and debridement was performed using a curette the procedure was well-tolerated. Pertinent items are noted in HPI.     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    Problem List Items Addressed This Visit       Ankle wound, right, initial encounter - Primary    Relevant Orders    LOWER EXT ART PVR MULT LEVEL SEG PRESSURES    DUPLEX LOWER EXT VENOUS REFLUX BILAT    INITIATE OUTPATIENT WOUND CARE PROTOCOL       Wound Ankle Left;Medial #1 04/03/23 (Active)   Wound Image   04/03/23 1340   Wound Etiology Arterial 04/03/23 1340   Dressing Status Other (Comment) 04/03/23 1340   Cleansed Cleansed with saline 04/03/23 1340   Wound Length (cm) 0.7 cm 04/03/23 1340   Wound Width (cm) 0.5 cm 04/03/23 1340   Wound Depth (cm) 0.1 cm 04/03/23 1340   Wound Surface Area (cm^2) 0.35 cm^2 04/03/23 1340   Wound Volume (cm^3) 0.035 cm^3 04/03/23 1340   Post-Procedure Length (cm) 0.7 cm 04/03/23 1357   Post-Procedure Width (cm) 0.5 cm 04/03/23 1357   Post-Procedure Depth (cm) 0.1 cm 04/03/23 1357   Post-Procedure Surface Area (cm^2) 0.35 cm^2 04/03/23 1357   Post-Procedure Volume (cm^3) 0.035 cm^3 04/03/23 1357   Wound Assessment Eschar dry 04/03/23 1340   Drainage Amount None 04/03/23 1340   Wound Odor None 04/03/23 1340   Isabelle-Wound/Incision Assessment Dry/flaky 04/03/23 1340   Edges Attached edges 04/03/23 1340   Wound Thickness Description Full thickness 04/03/23 1340   Number of days: 0       POP IN PROCEDURE TYPE (DEBRIDEMENT, BIOPSY, I&D, SKIN SUB, CHEMICAL CAUERTY)     Plan:   Start Medihoney gel daily to the left medial ankle wound  Arterial and venous noninvasive studies to hospital vascular laboratory    Treatment Note please see attached Discharge Instructions    Written patient dismissal instructions given to patient or POA. Electronically signed by Jackie Nelson MD on 4/3/2023 at 2:33 PM               Debridement Wound Care        Problem List Items Addressed This Visit       Ankle wound, right, initial encounter - Primary    Relevant Orders    LOWER EXT ART PVR MULT LEVEL SEG PRESSURES    DUPLEX LOWER EXT VENOUS REFLUX BILAT    INITIATE OUTPATIENT WOUND CARE PROTOCOL       Procedure Note  Indications:  Based on my examination of this patient's wound(s)/ulcer(s) today, debridement is required to promote healing and evaluate the wound base.     Performed by: Jackie Nelson MD    Consent obtained: Yes    Time out taken: Yes    Debridement: Excisional    Using curette the wound(s)/ulcer(s) was/were sharply debrided down through and including the removal of    subcutaneous tissue    Devitalized Tissue Debrided: slough    Pre Debridement Measurements:  Are located in the [facility]  Documentation Flow Sheet    Non-Pressure ulcer, fat layer exposed    Wound/Ulcer #: 1    Post Debridement Measurements:  Wound/Ulcer Descriptions are Pre Debridement except measurements:    Wound Ankle Left;Medial #1 04/03/23 (Active)   Wound Image   04/03/23 1340   Wound Etiology Arterial 04/03/23 1340   Dressing Status Other (Comment) 04/03/23 1340   Cleansed Cleansed with saline 04/03/23 1340   Wound Length (cm) 0.7 cm 04/03/23 1340   Wound Width (cm) 0.5 cm 04/03/23 1340   Wound Depth (cm) 0.1 cm 04/03/23 1340   Wound Surface Area (cm^2) 0.35 cm^2 04/03/23 1340   Wound Volume (cm^3) 0.035 cm^3 04/03/23 1340   Post-Procedure Length (cm) 0.7 cm 04/03/23 1357   Post-Procedure Width (cm) 0.5 cm 04/03/23 1357   Post-Procedure Depth (cm) 0.1 cm 04/03/23 1357   Post-Procedure Surface Area (cm^2) 0.35 cm^2 04/03/23 1357   Post-Procedure Volume (cm^3) 0.035 cm^3 04/03/23 1357   Wound Assessment Eschar dry 04/03/23 1340   Drainage Amount None 04/03/23 1340   Wound Odor None 04/03/23 1340   Isabelle-Wound/Incision Assessment Dry/flaky 04/03/23 1340   Edges Attached edges 04/03/23 1340   Wound Thickness Description Full thickness 04/03/23 1340   Number of days: 0        Total Surface Area Debrided:  0.35 sq cm     Estimated Blood Loss:  Minimal     Hemostasis Achieved: Pressure    Procedural Pain: 4 / 10     Post Procedural Pain: 1 / 10     Response to treatment: Patient tolerated treatment with mild discomfort but relieved after procedure was completed

## 2023-04-03 NOTE — WOUND CARE
Discharge Instructions for  [address] [place] [address], [address] [place] [address]  Telephone: [phone]    NAME:  Radha Izquierdo OF BIRTH:  6/9/1931  MEDICAL RECORD NUMBER:  [id number]  DATE:  4/3/2023      Return Appointment:  [] Dressing supply provider:   [x] Home Healthcare: [address] [place] [address] Drive for skilled nursing  [x] Return Appointment: 1 Week(s)  [] Nurse Visit:  Week(s)    [] Discharge from [facility]  [x] Ordered tests: Vascular testing to be done at [facility], please call [phone] to schedule  [] Referrals:   [] Rx:   [] Other:      Wound Cleansing:   Do not scrub or use excessive force. Cleanse wound prior to applying a clean dressing with:  [] Normal Saline   [x] Mild Soap & Water/Baby Shampoo   [] Keep Wound Dry in Shower  [] Other:      Topical Treatments:  Do not apply lotions, creams, or ointments to wound bed unless directed. [x] Apply moisturizing lotion to skin surrounding the wound prior to dressing change.  [] Apply antifungal ointment to skin surrounding the wound prior to dressing change.  [] Apply thin film of moisture barrier/zinc ointment to skin immediately around wound. [] Other:       Dressings:           Wound Location L Leg   [x] Apply Primary Dressing:       [x] MediHoney Gel    [] MediHoney Alginate               [] Calcium Alginate      [] Calcium Alginate with Silver   [] Collagen                   [] Collagen with Silver                [] Santyl with Moistened saline gauze              [] Hydrofera Blue (cut to size and moistened)  [] Hydrofera Blue Ready (Cut to size)   [] NS wet to dry    [] Betadine wet to dry              [] Hydrogel                [] Mepitel     [] Bactroban/Mupirocin               [] Other:     [x] Cover and Secure with:     [x] Gauze [x] Mitchlel [] Kerlix   [] Foam [] Super Absorbant [] ABD     [] ACE Wrap            [] Other:    Limit contact of tape with skin.     [x] Change dressing: [x] Daily    [] Every Other Day [] Once per week   [] Twice per week   [] Three times per week [] Do Not Change Dressing   [] Other:      Edema Control:  Apply: [] Compression Stocking:   mmHg  []Right Leg []Left Leg   [x] Tubigrip []Right Leg Double Layer []Left Leg Double Layer      []Right Leg Single Layer [x]Left Leg Single Layer     [x] Elevate leg(s) above the level of the heart when sitting. [x] Avoid prolonged standing in one place. Compression:  Apply: [] Multilayer Compression Wrap: []RightLeg []Left Leg                                 []Coflex   []Coflex Lite             []Unna     [] Do not get leg(s) with wrap wet. If wraps become too tight call the center or completely remove the wrap. [] Elevate leg(s) above the level of the heart when sitting. [] Avoid prolonged standing in one place. Dietary:  [x] Diet as tolerated: [] Calorie Diabetic Diet: [] No Added Salt:  [x] Increase Protein: [] Other:     Activity:  [x] Activity as tolerated:    [] Patient has no activity restrictions       [] Strict Bedrest:   [] Remain off Work:       [] May return to full duty work:                                     [] Return to work with restrictions:               [address] Information: Should you experience any significant changes in your wound(s) or have questions about your wound care, please contact Sofia Cano at [place] [address] 8:00 am - 4:30. If you need help with your wound outside of these hours and cannot wait until we are again available, contact your PCP or go to the hospital emergency room. PLEASE NOTE: IF YOU ARE UNABLE TO OBTAIN WOUND SUPPLIES, CONTINUE TO USE THE SUPPLIES YOU HAVE AVAILABLE UNTIL YOU ARE ABLE TO REACH US. IT IS MOST IMPORTANT TO KEEP THE WOUND COVERED AT ALL TIMES.     [x] Dr. Emiliana Avery   [] Dr. Mychal Rivas  [] Dr. Frederic Christopher

## 2023-04-03 NOTE — DISCHARGE INSTRUCTIONS
Discharge Instructions for  [address] [place] [address], [facility]  Telephone: [phone]    NAME:  Bonita Fung OF BIRTH:  6/9/1931  MEDICAL RECORD NUMBER:  [id number]  DATE:  4/3/2023      Return Appointment:  [] Dressing supply provider:   [x] Home Healthcare: [address] [place] [address] Drive for skilled nursing  [x] Return Appointment: 1 Week(s)  [] Nurse Visit:  Week(s)    [] Discharge from [facility]  [x] Ordered tests: Vascular testing to be done at [facility], please call [phone] to schedule  [] Referrals:   [] Rx:   [] Other:      Wound Cleansing:   Do not scrub or use excessive force. Cleanse wound prior to applying a clean dressing with:  [] Normal Saline   [x] Mild Soap & Water/Baby Shampoo   [] Keep Wound Dry in Shower  [] Other:      Topical Treatments:  Do not apply lotions, creams, or ointments to wound bed unless directed. [x] Apply moisturizing lotion to skin surrounding the wound prior to dressing change.  [] Apply antifungal ointment to skin surrounding the wound prior to dressing change.  [] Apply thin film of moisture barrier/zinc ointment to skin immediately around wound. [] Other:       Dressings:           Wound Location L Leg   [x] Apply Primary Dressing:       [x] MediHoney Gel    [] MediHoney Alginate               [] Calcium Alginate      [] Calcium Alginate with Silver   [] Collagen                   [] Collagen with Silver                [] Santyl with Moistened saline gauze              [] Hydrofera Blue (cut to size and moistened)  [] Hydrofera Blue Ready (Cut to size)   [] NS wet to dry    [] Betadine wet to dry              [] Hydrogel                [] Mepitel     [] Bactroban/Mupirocin               [] Other:     [x] Cover and Secure with:     [x] Gauze [x] Mitchell [] Kerlix   [] Foam [] Super Absorbant [] ABD     [] ACE Wrap            [] Other:    Limit contact of tape with skin.     [x] Change dressing: [x] Daily    [] Every Other Day [] Once per week   [] Twice per week   [] Three times per week [] Do Not Change Dressing   [] Other:      Edema Control:  Apply: [] Compression Stocking:   mmHg  []Right Leg []Left Leg   [x] Tubigrip []Right Leg Double Layer []Left Leg Double Layer      []Right Leg Single Layer [x]Left Leg Single Layer     [x] Elevate leg(s) above the level of the heart when sitting. [x] Avoid prolonged standing in one place. Compression:  Apply: [] Multilayer Compression Wrap: []RightLeg []Left Leg                                 []Coflex   []Coflex Lite             []Unna     [] Do not get leg(s) with wrap wet. If wraps become too tight call the center or completely remove the wrap. [] Elevate leg(s) above the level of the heart when sitting. [] Avoid prolonged standing in one place. Dietary:  [x] Diet as tolerated: [] Calorie Diabetic Diet: [] No Added Salt:  [x] Increase Protein: [] Other:     Activity:  [x] Activity as tolerated:    [] Patient has no activity restrictions       [] Strict Bedrest:   [] Remain off Work:       [] May return to full duty work:                                     [] Return to work with restrictions:               [address] Information: Should you experience any significant changes in your wound(s) or have questions about your wound care, please contact Sofia Cano at [place] [phone] 8:00 am - 4:30. If you need help with your wound outside of these hours and cannot wait until we are again available, contact your PCP or go to the hospital emergency room. PLEASE NOTE: IF YOU ARE UNABLE TO OBTAIN WOUND SUPPLIES, CONTINUE TO USE THE SUPPLIES YOU HAVE AVAILABLE UNTIL YOU ARE ABLE TO REACH US. IT IS MOST IMPORTANT TO KEEP THE WOUND COVERED AT ALL TIMES.     [x] Dr. Rhiannon Douglas   [] Dr. Karon Villaseñor  [] Dr. Marianne Wilcox

## 2023-04-10 PROBLEM — S91.001D ANKLE WOUND, RIGHT, SUBSEQUENT ENCOUNTER: Status: ACTIVE | Noted: 2023-04-03

## 2023-04-13 ENCOUNTER — HOSPITAL ENCOUNTER (OUTPATIENT)
Dept: NON INVASIVE DIAGNOSTICS | Age: 88
Discharge: HOME | End: 2023-04-13
Attending: SPECIALIST
Payer: MEDICARE

## 2023-04-13 DIAGNOSIS — S91.001A ANKLE WOUND, RIGHT, INITIAL ENCOUNTER: ICD-10-CM

## 2023-04-13 PROCEDURE — 93923 UPR/LXTR ART STDY 3+ LVLS: CPT

## 2023-04-13 PROCEDURE — 93970 EXTREMITY STUDY: CPT

## 2023-04-14 LAB
LEFT ABI: 0.99
LEFT ARM BP: 136 MMHG
LEFT POSTERIOR TIBIAL: 140 MMHG
LEFT TBI: 0.93
LEFT TOE PRESSURE: 132 MMHG
RIGHT ABI: 1.02
RIGHT ARM BP: 142 MMHG
RIGHT POSTERIOR TIBIAL: 145 MMHG
RIGHT TBI: 0.56
RIGHT TOE PRESSURE: 79 MMHG
VAS LEFT DORSALIS PEDIS BP: 92 MMHG
VAS RIGHT DORSALIS PEDIS BP: 137 MMHG

## 2023-04-24 ENCOUNTER — HOSPITAL ENCOUNTER (OUTPATIENT)
Dept: WOUND CARE | Age: 88
Discharge: HOME | End: 2023-04-24
Payer: MEDICARE

## 2023-04-24 VITALS
SYSTOLIC BLOOD PRESSURE: 148 MMHG | DIASTOLIC BLOOD PRESSURE: 71 MMHG | HEART RATE: 80 BPM | RESPIRATION RATE: 18 BRPM | TEMPERATURE: 98.5 F

## 2023-04-24 DIAGNOSIS — S91.001D ANKLE WOUND, RIGHT, SUBSEQUENT ENCOUNTER: Primary | ICD-10-CM

## 2023-04-24 PROCEDURE — 11042 DBRDMT SUBQ TIS 1ST 20SQCM/<: CPT

## 2023-04-24 PROCEDURE — 74011000250 HC RX REV CODE- 250: Performed by: SPECIALIST

## 2023-04-24 RX ORDER — TRIAMCINOLONE ACETONIDE 1 MG/G
OINTMENT TOPICAL ONCE
OUTPATIENT
Start: 2023-04-24 | End: 2023-04-24

## 2023-04-24 RX ORDER — MUPIROCIN 20 MG/G
OINTMENT TOPICAL ONCE
OUTPATIENT
Start: 2023-04-24 | End: 2023-04-24

## 2023-04-24 RX ORDER — POLYETHYLENE GLYCOL 3350 17 G/17G
17 POWDER, FOR SOLUTION ORAL DAILY
COMMUNITY

## 2023-04-24 RX ORDER — LIDOCAINE HYDROCHLORIDE 20 MG/ML
15 SOLUTION OROPHARYNGEAL ONCE
OUTPATIENT
Start: 2023-04-24 | End: 2023-04-24

## 2023-04-24 RX ORDER — LIDOCAINE HYDROCHLORIDE 20 MG/ML
15 SOLUTION OROPHARYNGEAL ONCE
Status: COMPLETED | OUTPATIENT
Start: 2023-04-24 | End: 2023-04-24

## 2023-04-24 RX ORDER — SILVER SULFADIAZINE 10 G/1000G
CREAM TOPICAL ONCE
OUTPATIENT
Start: 2023-04-24 | End: 2023-04-24

## 2023-04-24 RX ADMIN — LIDOCAINE HYDROCHLORIDE 15 ML: 20 SOLUTION ORAL; TOPICAL at 14:28

## 2023-04-24 NOTE — WOUND CARE
Discharge Instructions for  [address] [place] [address], [address] [place] [address]  Telephone: [phone]    NAME:  Ledy Jane OF BIRTH:  6/9/1931  MEDICAL RECORD NUMBER:  [id number]  DATE:  4/24/2023      Return Appointment:  [] Dressing supply provider:   [x] Home Healthcare: Bonnie Murphy  [x] Return Appointment: 2 Week(s)  [] Nurse Visit:  Theotis Brought)    [] Discharge from [facility]  [] Ordered tests:   [] Referrals:   [] Rx:   [] Other:      Wound Cleansing:   Do not scrub or use excessive force. Cleanse wound prior to applying a clean dressing with:  [] Normal Saline   [x] Mild Soap & Water/Baby Shampoo   [] Keep Wound Dry in Shower  [] Other:      Topical Treatments:  Do not apply lotions, creams, or ointments to wound bed unless directed. [x] Apply moisturizing lotion to skin surrounding the wound prior to dressing change.  [] Apply antifungal ointment to skin surrounding the wound prior to dressing change.  [] Apply thin film of moisture barrier/zinc ointment to skin immediately around wound. [] Other:       Dressings:           Wound Location L Leg   [x] Apply Primary Dressing:       [] MediHoney Gel    [] MediHoney Alginate               [x] Calcium Alginate without silver     [] Calcium Alginate with Silver   [] Collagen                   [] Collagen with Silver                [] Santyl with Moistened saline gauze              [] Hydrofera Blue (cut to size and moistened)  [] Hydrofera Blue Ready (Cut to size)   [] NS wet to dry    [] Betadine wet to dry              [] Hydrogel                [] Mepitel     [] Bactroban/Mupirocin               [] Other:     [x] Cover and Secure with:     [x] Gauze [x] Mitchell [] Kerlix   [] Foam [] Super Absorbant [] ABD     [] ACE Wrap            [] Other:    Limit contact of tape with skin.     [x] Change dressing: [] Daily    [] Every Other Day [] Once per week   [] Twice per week   [x] Three times per week [] Do Not Change Dressing   [] Other:      Edema Control:  Apply: [] Compression Stocking:   mmHg  []Right Leg []Left Leg   [x] Tubigrip []Right Leg Double Layer []Left Leg Double Layer      []Right Leg Single Layer [x]Left Leg Single Layer     [x] Elevate leg(s) above the level of the heart when sitting. [x] Avoid prolonged standing in one place. Dietary:  [x] Diet as tolerated: [] Calorie Diabetic Diet: [] No Added Salt:  [x] Increase Protein: [] Other:     Activity:  [x] Activity as tolerated:    [] Patient has no activity restrictions       [] Strict Bedrest:   [] Remain off Work:       [] May return to full duty work:                                     [] Return to work with restrictions:               [address] Information: Should you experience any significant changes in your wound(s) or have questions about your wound care, please contact Sofia Cano at [place] [address] 8:00 am - 4:30. If you need help with your wound outside of these hours and cannot wait until we are again available, contact your PCP or go to the hospital emergency room. PLEASE NOTE: IF YOU ARE UNABLE TO OBTAIN WOUND SUPPLIES, CONTINUE TO USE THE SUPPLIES YOU HAVE AVAILABLE UNTIL YOU ARE ABLE TO REACH US. IT IS MOST IMPORTANT TO KEEP THE WOUND COVERED AT ALL TIMES.     [x] Dr. Emory Choudhary   [] Dr. Nathanael Aquino  [] Dr. Yane Blanton

## 2023-04-24 NOTE — WOUND CARE
[address]   Progress Note and Procedure Note     [address] RECORD NUMBER:  [id number]  AGE: 80 y.o. RACE WHITE/NON-  GENDER: female  : 1931  EPISODE DATE:  2023    Subjective:   No new problems  Chief Complaint   Patient presents with    Wound Check     Right ankle         HISTORY of PRESENT ILLNESS HPI    Surendra Banks is a 80 y.o. female who presents today for wound/ulcer evaluation. History of Wound Context: R ankle  Wound/Ulcer Pain Timing/Severity: mild  Quality of pain: aching  Severity:  1 / 10   Modifying Factors: None  Associated Signs/Symptoms: edema    Ulcer Identification:  Ulcer Type: pressure    Contributing Factors: edema    Wound: R ankle         PAST MEDICAL HISTORY    Past Medical History:   Diagnosis Date    High cholesterol     Hypertension     Hypothyroidism         PAST SURGICAL HISTORY    Past Surgical History:   Procedure Laterality Date    HX BREAST LUMPECTOMY      HX HYSTERECTOMY         FAMILY HISTORY    Family History   Problem Relation Age of Onset    Heart Disease Mother     Cancer Father        SOCIAL HISTORY    Social History     Tobacco Use    Smoking status: Former    Smokeless tobacco: Never   Vaping Use    Vaping Use: Never used   Substance Use Topics    Alcohol use: Not Currently    Drug use: Never       ALLERGIES    No Known Allergies    MEDICATIONS    Current Outpatient Medications on File Prior to Encounter   Medication Sig Dispense Refill    polyethylene glycol (Miralax) 17 gram/dose powder Take 17 g by mouth daily. amLODIPine (NORVASC) 5 mg tablet Take 1 Tablet by mouth daily. atorvastatin (LIPITOR) 20 mg tablet Take 1 Tablet by mouth daily. ramipriL (ALTACE) 1.25 mg capsule Take 1 Capsule by mouth daily. latanoprost (XALATAN) 0.005 % ophthalmic solution Administer 1 Drop to both eyes nightly. Synthroid 150 mcg tablet Take 1 Tablet by mouth every morning.       cyanocobalamin 1,000 mcg tablet Take 1 Tablet by mouth daily. timolol (TIMOPTIC) 0.5 % ophthalmic solution Administer 1 Drop to both eyes daily. cholecalciferol (VITAMIN D3) (2,000 UNITS /50 MCG) cap capsule Take 1 Capsule by mouth daily. No current facility-administered medications on file prior to encounter. REVIEW OF SYSTEMS    Pertinent items are noted in HPI. Objective:     Visit Vitals  BP (!) 148/71 (BP 1 Location: Right arm, BP Patient Position: At rest;Sitting)   Pulse 80   Temp 98.5 °F (36.9 °C)   Resp 18       Wt Readings from Last 3 Encounters:   04/03/23 53.5 kg (118 lb)   04/12/22 54.4 kg (119 lb 14.9 oz)   04/20/21 54.4 kg (120 lb)       PHYSICAL EXAM  Wwound small, almost healed    Pertinent items are noted in HPI. Assessment:   Good progress     Problem List Items Addressed This Visit       Ankle wound, right, subsequent encounter - Primary    Relevant Medications    lidocaine (XYLOCAINE) 2 % viscous solution 15 mL (Completed)    Other Relevant Orders    INITIATE OUTPATIENT WOUND CARE PROTOCOL       Wound Ankle Left;Medial #1 04/03/23 (Active)   Wound Image   04/24/23 1419   Wound Etiology Arterial 04/24/23 1419   Dressing Status Clean;Dry; Intact 04/24/23 1419   Cleansed Cleansed with saline 04/24/23 1419   Wound Length (cm) 0.5 cm 04/24/23 1419   Wound Width (cm) 0.5 cm 04/24/23 1419   Wound Depth (cm) 0.2 cm 04/24/23 1419   Wound Surface Area (cm^2) 0.25 cm^2 04/24/23 1419   Change in Wound Size % 28.57 04/24/23 1419   Wound Volume (cm^3) 0.05 cm^3 04/24/23 1419   Wound Healing % -43 04/24/23 1419   Post-Procedure Length (cm) 0.5 cm 04/24/23 1446   Post-Procedure Width (cm) 0.5 cm 04/24/23 1446   Post-Procedure Depth (cm) 0.2 cm 04/24/23 1446   Post-Procedure Surface Area (cm^2) 0.25 cm^2 04/24/23 1446   Post-Procedure Volume (cm^3) 0.05 cm^3 04/24/23 1446   Wound Assessment [facility] 04/24/23 1419   Drainage Amount Moderate 04/24/23 1419   Drainage Description Serosanguinous 04/24/23 1419   Wound Odor None 04/24/23 1419   Isabelle-Wound/Incision Assessment Intact 04/24/23 1419   Edges Attached edges 04/24/23 1419   Wound Thickness Description Full thickness 04/24/23 1419   Number of days: 21       POP IN PROCEDURE TYPE (DEBRIDEMENT, BIOPSY, I&D, SKIN SUB, CHEMICAL CAUERTY)     Plan:   Continue calcium alginate    Treatment Note please see attached Discharge Instructions    Written patient dismissal instructions given to patient or POA. Electronically signed by Kelly Heredia MD on 4/24/2023 at 3:00 PM               Debridement Wound Care        Problem List Items Addressed This Visit       Ankle wound, right, subsequent encounter - Primary    Relevant Medications    lidocaine (XYLOCAINE) 2 % viscous solution 15 mL (Completed)    Other Relevant Orders    INITIATE OUTPATIENT WOUND CARE PROTOCOL       Procedure Note  Indications:  Based on my examination of this patient's wound(s)/ulcer(s) today, debridement is required to promote healing and evaluate the wound base. Performed by: Kelly Heredia MD    Consent obtained: Yes    Time out taken: Yes    Debridement: Excisional    Using curette the wound(s)/ulcer(s) was/were sharply debrided down through and including the removal of    subcutaneous tissue    Devitalized Tissue Debrided: slough    Pre Debridement Measurements:  Are located in the [place]  Documentation Flow Sheet    Non-Pressure ulcer, fat layer exposed    Wound/Ulcer #: 1    Post Debridement Measurements:  Wound/Ulcer Descriptions are Pre Debridement except measurements:    Wound Ankle Left;Medial #1 04/03/23 (Active)   Wound Image   04/24/23 1419   Wound Etiology Arterial 04/24/23 1419   Dressing Status Clean;Dry; Intact 04/24/23 1419   Cleansed Cleansed with saline 04/24/23 1419   Wound Length (cm) 0.5 cm 04/24/23 1419   Wound Width (cm) 0.5 cm 04/24/23 1419   Wound Depth (cm) 0.2 cm 04/24/23 1419   Wound Surface Area (cm^2) 0.25 cm^2 04/24/23 1419   Change in Wound Size % 28.57 04/24/23 1419   Wound Volume (cm^3) 0.05 cm^3 04/24/23 1419   Wound Healing % -43 04/24/23 1419   Post-Procedure Length (cm) 0.5 cm 04/24/23 1446   Post-Procedure Width (cm) 0.5 cm 04/24/23 1446   Post-Procedure Depth (cm) 0.2 cm 04/24/23 1446   Post-Procedure Surface Area (cm^2) 0.25 cm^2 04/24/23 1446   Post-Procedure Volume (cm^3) 0.05 cm^3 04/24/23 1446   Wound Assessment Slough 04/24/23 1419   Drainage Amount Moderate 04/24/23 1419   Drainage Description Serosanguinous 04/24/23 1419   Wound Odor None 04/24/23 1419   Isabelle-Wound/Incision Assessment Intact 04/24/23 1419   Edges Attached edges 04/24/23 1419   Wound Thickness Description Full thickness 04/24/23 1419   Number of days: 21        Total Surface Area Debrided:  0.25 sq cm     Estimated Blood Loss:  None    Hemostasis Achieved: Pressure    Procedural Pain: 1 / 10     Post Procedural Pain: 0 / 10     Response to treatment: Well tolerated by patient

## 2023-04-24 NOTE — DISCHARGE INSTRUCTIONS
Discharge Instructions for  [address] [place] [address], [address] [place] [address]  Telephone: [phone]    NAME:  Dale Mask OF BIRTH:  6/9/1931  MEDICAL RECORD NUMBER:  [id number]  DATE:  4/24/2023      Return Appointment:  [] Dressing supply provider:   [x] Home Healthcare: Ronnell Olivia  [x] Return Appointment: 2 Week(s)  [] Nurse Visit:  Maria Eduardo    [] Discharge from [facility]  [] Ordered tests:   [] Referrals:   [] Rx:   [] Other:      Wound Cleansing:   Do not scrub or use excessive force. Cleanse wound prior to applying a clean dressing with:  [] Normal Saline   [x] Mild Soap & Water/Baby Shampoo   [] Keep Wound Dry in Shower  [] Other:      Topical Treatments:  Do not apply lotions, creams, or ointments to wound bed unless directed. [x] Apply moisturizing lotion to skin surrounding the wound prior to dressing change.  [] Apply antifungal ointment to skin surrounding the wound prior to dressing change.  [] Apply thin film of moisture barrier/zinc ointment to skin immediately around wound. [] Other:       Dressings:           Wound Location L Leg   [x] Apply Primary Dressing:       [] MediHoney Gel    [] MediHoney Alginate               [x] Calcium Alginate without silver     [] Calcium Alginate with Silver   [] Collagen                   [] Collagen with Silver                [] Santyl with Moistened saline gauze              [] Hydrofera Blue (cut to size and moistened)  [] Hydrofera Blue Ready (Cut to size)   [] NS wet to dry    [] Betadine wet to dry              [] Hydrogel                [] Mepitel     [] Bactroban/Mupirocin               [] Other:     [x] Cover and Secure with:     [x] Gauze [x] Mitchell [] Kerlix   [] Foam [] Super Absorbant [] ABD     [] ACE Wrap            [] Other:    Limit contact of tape with skin.     [x] Change dressing: [] Daily    [] Every Other Day [] Once per week   [] Twice per week   [x] Three times per week [] Do Not Change Dressing   [] Other:      Edema Control:  Apply: [] Compression Stocking:   mmHg  []Right Leg []Left Leg   [x] Tubigrip []Right Leg Double Layer []Left Leg Double Layer      []Right Leg Single Layer [x]Left Leg Single Layer     [x] Elevate leg(s) above the level of the heart when sitting. [x] Avoid prolonged standing in one place. Dietary:  [x] Diet as tolerated: [] Calorie Diabetic Diet: [] No Added Salt:  [x] Increase Protein: [] Other:     Activity:  [x] Activity as tolerated:    [] Patient has no activity restrictions       [] Strict Bedrest:   [] Remain off Work:       [] May return to full duty work:                                     [] Return to work with restrictions:               [address] Information: Should you experience any significant changes in your wound(s) or have questions about your wound care, please contact Sofia Cano at [place] [address] 8:00 am - 4:30. If you need help with your wound outside of these hours and cannot wait until we are again available, contact your PCP or go to the hospital emergency room. PLEASE NOTE: IF YOU ARE UNABLE TO OBTAIN WOUND SUPPLIES, CONTINUE TO USE THE SUPPLIES YOU HAVE AVAILABLE UNTIL YOU ARE ABLE TO REACH US. IT IS MOST IMPORTANT TO KEEP THE WOUND COVERED AT ALL TIMES.     [x] Dr. Dale Mathews   [] Dr. Roney Brunson  [] Dr. Rob Adame

## 2023-05-15 ENCOUNTER — HOSPITAL ENCOUNTER (OUTPATIENT)
Facility: HOSPITAL | Age: 88
Discharge: HOME | End: 2023-05-15
Payer: MEDICARE

## 2023-05-15 VITALS
TEMPERATURE: 98.5 F | DIASTOLIC BLOOD PRESSURE: 67 MMHG | WEIGHT: 118 LBS | HEART RATE: 50 BPM | HEIGHT: 62 IN | RESPIRATION RATE: 18 BRPM | BODY MASS INDEX: 21.71 KG/M2 | SYSTOLIC BLOOD PRESSURE: 124 MMHG

## 2023-05-15 PROCEDURE — 99213 OFFICE O/P EST LOW 20 MIN: CPT

## 2023-05-15 RX ORDER — CILOSTAZOL 100 MG/1
100 TABLET ORAL 2 TIMES DAILY
COMMUNITY
Start: 2023-05-10

## 2023-05-15 RX ORDER — TIMOLOL MALEATE 5 MG/ML
1 SOLUTION/ DROPS OPHTHALMIC DAILY
COMMUNITY

## 2023-05-15 RX ORDER — LIDOCAINE HYDROCHLORIDE 20 MG/ML
10 SOLUTION OROPHARYNGEAL AS NEEDED
Status: DISCONTINUED | OUTPATIENT
Start: 2023-05-15 | End: 2023-05-16 | Stop reason: HOSPADM

## 2023-05-15 RX ORDER — ATORVASTATIN CALCIUM 20 MG/1
20 TABLET, FILM COATED ORAL DAILY
COMMUNITY
Start: 2023-04-07

## 2023-05-15 RX ORDER — AMLODIPINE BESYLATE 5 MG/1
5 TABLET ORAL DAILY
COMMUNITY
Start: 2021-12-29

## 2023-05-15 RX ORDER — LATANOPROST 50 UG/ML
SOLUTION/ DROPS OPHTHALMIC
COMMUNITY
Start: 2022-02-24

## 2023-05-15 RX ORDER — POLYETHYLENE GLYCOL 3350 17 G/17G
17 POWDER, FOR SOLUTION ORAL DAILY
COMMUNITY

## 2023-05-15 RX ORDER — SODIUM CHLOR/HYPOCHLOROUS ACID 0.033 %
SOLUTION, IRRIGATION IRRIGATION ONCE
OUTPATIENT
Start: 2023-05-15 | End: 2023-05-15

## 2023-05-15 RX ORDER — ATORVASTATIN CALCIUM 40 MG/1
TABLET, FILM COATED ORAL
COMMUNITY
End: 2023-05-15

## 2023-05-15 RX ORDER — LEVOTHYROXINE SODIUM 137 UG/1
TABLET ORAL
COMMUNITY

## 2023-05-15 RX ORDER — UBIDECARENONE 75 MG
CAPSULE ORAL
COMMUNITY

## 2023-05-15 RX ORDER — LIDOCAINE HYDROCHLORIDE 20 MG/ML
JELLY TOPICAL ONCE
OUTPATIENT
Start: 2023-05-15 | End: 2023-05-15

## 2023-05-15 RX ORDER — RAMIPRIL 1.25 MG/1
1.25 CAPSULE ORAL DAILY
COMMUNITY
Start: 2022-01-26

## 2023-05-15 NOTE — WOUND CARE
Absorbant [] ABD     [] Ace Wrap [] Other:    Limit contact of tape with skin. [x] Change dressing: [] Daily    [] Every Other Day   [] Twice per week   [x] Three times per week   [] Once a week [] Do Not Change Dressing   [] Other:   Edema Control:  Apply: [] Compression Stocking:  mmHg  []Right Leg []Left Leg   [x] Tubigrip []Right Leg Double Layer []Left Leg Double Layer      []Right Leg Single Layer [x]Left Leg Single Layer      [x] Elevate leg(s) above the level of the heart when sitting. [x] Avoid prolonged standing in one place. Dietary:  [x] Diet as tolerated: [] Calorie Diabetic Diet: [] No Added Salt:  [x] Increase Protein: [] Other:     Activity:  [x] Activity as tolerated:    [] Patient has no activity restrictions      [] Strict Bedrest   [] Remain off Work      [] May return to full duty work                                     [] Return to work with restrictions     Physician:  [x] Dr. Lluvia Moseley  [] Dr. Perry Mckay  [] Dr. Ezio Jimenez      Nurse Case Manger:  Yola Quiñonez [address] Information: Should you experience any significant changes in your wound(s) or have questions about your wound care, please contact the [address] [facility],[address] at [phone]. Our hours are Monday - Friday 8am - 4:30pm, closed all major holidays. If you need help with your wound outside these hours and cannot wait until we are again available, contact your PCP or go to the hospital emergency room. PLEASE NOTE: IF YOU ARE UNABLE TO OBTAIN WOUND SUPPLIES, CONTINUE TO USE THE SUPPLIES YOU HAVE AVAILABLE UNTIL YOU ARE ABLE TO REACH US. IT IS MOST IMPORTANT TO KEEP THE WOUND COVERED AT ALL TIMES.
Other Day   [] Twice per week   [x] Three times per week   [] Once a week [] Do Not Change Dressing   [] Other:   Edema Control:  Apply: [] Compression Stocking:  mmHg  []Right Leg []Left Leg   [x] Tubigrip []Right Leg Double Layer []Left Leg Double Layer      []Right Leg Single Layer [x]Left Leg Single Layer      [x] Elevate leg(s) above the level of the heart when sitting. [x] Avoid prolonged standing in one place. Dietary:  [x] Diet as tolerated: [] Calorie Diabetic Diet: [] No Added Salt:  [x] Increase Protein: [] Other:     Activity:  [x] Activity as tolerated:    [] Patient has no activity restrictions      [] Strict Bedrest   [] Remain off Work      [] May return to full duty work                                     [] Return to work with restrictions     Physician:  [x] Dr. Ursula Hoyos  [] Dr. Nancy Mayorga  [] Dr. Heriberto Leonard      Nurse Case Manger:  Yola Quiñonez [address] Information: Should you experience any significant changes in your wound(s) or have questions about your wound care, please contact the [address] [place] [address],[address] at [phone]. Our hours are Monday - Friday 8am - 4:30pm, closed all major holidays. If you need help with your wound outside these hours and cannot wait until we are again available, contact your PCP or go to the hospital emergency room. PLEASE NOTE: IF YOU ARE UNABLE TO OBTAIN WOUND SUPPLIES, CONTINUE TO USE THE SUPPLIES YOU HAVE AVAILABLE UNTIL YOU ARE ABLE TO REACH US. IT IS MOST IMPORTANT TO KEEP THE WOUND COVERED AT ALL TIMES.             Electronically signed by Aaron Cooper MD on 5/15/2023 at 2:28 PM

## 2023-05-15 NOTE — DISCHARGE INSTRUCTIONS
Wound Clinic Physician Orders and Discharge Instructions  [facility]  [address] , [address]  Telephone: [phone]    NAME:  Suki Mack  YOB: 1931  MEDICAL RECORD NUMBER:  [id number]  DATE:  5/15/2023      Return Appointment:  [] Dressing Supply Provider:   [x] Home Healthcare: Loy Elliott  [x] Return Appointment: 1 Week(s)  [] Nurse Visit:     [] Discharge from [facility]: [] Healed        [] Refer to Provider:         [] Consult    Follow-up Information:  [] Ordered Tests:   [] Referrals:   [] Rx:   [] Other:       Wound Cleansing:   Do not scrub or use excessive force. Cleanse wound prior to applying a clean dressing with:  [] Normal Saline   [] Keep Wound Dry in Shower     [] Wound Cleanser   [x] Cleanse wound with Mild Soap & Water    [] Other:       Topical Treatments:  Do not apply lotions, creams, or ointments to wound bed unless directed. [x] Apply moisturizing lotion to skin surrounding the wound prior to dressing change.  [] Apply antifungal ointment to skin surrounding the wound prior to dressing change.  [] Apply thin film of moisture barrier ointment to skin immediately around wound.   [] Apply Betadine to skin immediately around wound   [] Other:      Dressings:           Wound Location L Leg    [x] Apply Primary Dressing:       [] MediHoney Gel [] MediHoney Alginate  [] Calcium Alginate with Silver   [x] Calcium Alginate without silver   [] Collagen with silver [] Collagen without Silver    [] Santyl with moist saline gauze     [] Hydrofera Blue (cut to size and moistened with normal saline)  [] Hydrofera Blue Ready (cut to size)      [] Normal Saline wet to dry  [] Betadine wet to dry    [] Hydrogel  [] Mepitel     [] Bactroban/Mupirocin   [] Iodoform Packing Strip [] Plain Packing Strip   [] Skin Sub:   [] Other:      [x] Cover and Secure with:     [x] Gauze [x] Belkis [] Kerlix   [] Foam  [] Super

## 2023-05-17 RX ORDER — ATORVASTATIN CALCIUM 20 MG/1
20 TABLET, FILM COATED ORAL DAILY
COMMUNITY
Start: 2022-01-29

## 2023-05-17 RX ORDER — LEVOTHYROXINE SODIUM 0.15 MG/1
1 TABLET ORAL EVERY MORNING
COMMUNITY
Start: 2022-03-07 | End: 2023-05-22

## 2023-05-22 ENCOUNTER — HOSPITAL ENCOUNTER (OUTPATIENT)
Facility: HOSPITAL | Age: 88
Discharge: HOME | End: 2023-05-22
Payer: MEDICARE

## 2023-05-22 VITALS
HEART RATE: 67 BPM | TEMPERATURE: 97.9 F | RESPIRATION RATE: 18 BRPM | DIASTOLIC BLOOD PRESSURE: 78 MMHG | SYSTOLIC BLOOD PRESSURE: 160 MMHG

## 2023-05-22 DIAGNOSIS — S91.001D ANKLE WOUND, RIGHT, SUBSEQUENT ENCOUNTER: Primary | ICD-10-CM

## 2023-05-22 PROCEDURE — 99213 OFFICE O/P EST LOW 20 MIN: CPT

## 2023-05-22 RX ORDER — LIDOCAINE HYDROCHLORIDE 20 MG/ML
JELLY TOPICAL ONCE
OUTPATIENT
Start: 2023-05-22 | End: 2023-05-22

## 2023-05-22 RX ORDER — SODIUM CHLOR/HYPOCHLOROUS ACID 0.033 %
SOLUTION, IRRIGATION IRRIGATION ONCE
OUTPATIENT
Start: 2023-05-22 | End: 2023-05-22

## 2023-05-22 RX ORDER — LIDOCAINE HYDROCHLORIDE 20 MG/ML
10 SOLUTION OROPHARYNGEAL AS NEEDED
Status: DISCONTINUED | OUTPATIENT
Start: 2023-05-22 | End: 2023-05-23 | Stop reason: HOSPADM

## 2023-05-22 NOTE — WOUND CARE
Wound Clinic Physician Orders and Discharge Instructions  [facility] [address]  [address] , [address]  Telephone: [phone]    NAME:  Delgado Vega  YOB: 1931  MEDICAL RECORD NUMBER:  [id number]  DATE:  5/22/2023      Return Appointment:  [] Dressing Supply Provider:   [x] Home Healthcare: Harpreet Glez  [x] Return Appointment: 2 Week(s)  [] Nurse Visit:     [] Discharge from [facility]: [] Healed        [] Refer to Provider:         [] Consult    Follow-up Information:  [] Ordered Tests:   [] Referrals:   [] Rx:   [] Other:       Wound Cleansing:   Do not scrub or use excessive force. Cleanse wound prior to applying a clean dressing with:  [] Normal Saline   [] Keep Wound Dry in Shower     [] Wound Cleanser   [x] Cleanse wound with Mild Soap & Water    [] Other:       Topical Treatments:  Do not apply lotions, creams, or ointments to wound bed unless directed. [x] Apply moisturizing lotion to skin surrounding the wound prior to dressing change.  [] Apply antifungal ointment to skin surrounding the wound prior to dressing change.  [] Apply thin film of moisture barrier ointment to skin immediately around wound.   [] Apply Betadine to skin immediately around wound   [] Other:      Dressings:           Wound Location L Leg    [x] Apply Primary Dressing:       [x] MediHoney Gel [] MediHoney Alginate  [] Calcium Alginate with Silver   [] Calcium Alginate without silver   [] Collagen with silver [] Collagen without Silver    [] Santyl with moist saline gauze     [] Hydrofera Blue (cut to size and moistened with normal saline)  [] Hydrofera Blue Ready (cut to size)      [] Normal Saline wet to dry  [] Betadine wet to dry    [] Hydrogel  [] Mepitel     [] Bactroban/Mupirocin   [] Iodoform Packing Strip [] Plain Packing Strip   [] Skin Sub:   [] Other:      [x] Cover and Secure with:     [x] Gauze [x] Belkis or border [] Kerlix   [] Foam  []

## 2023-05-22 NOTE — DISCHARGE INSTRUCTIONS
Wound Clinic Physician Orders and Discharge Instructions  [facility] 27  [address] , [address]  Telephone: [phone]    NAME:  Leoncio Serna  YOB: 1931  MEDICAL RECORD NUMBER:  [id number]  DATE:  5/22/2023      Return Appointment:  [] Dressing Supply Provider:   [x] Home Healthcare: Mor Redmond  [x] Return Appointment: 2 Week(s)  [] Nurse Visit:     [] Discharge from [facility] [] Healed        [] Refer to Provider:         [] Consult    Follow-up Information:  [] Ordered Tests:   [] Referrals:   [] Rx:   [] Other:       Wound Cleansing:   Do not scrub or use excessive force. Cleanse wound prior to applying a clean dressing with:  [] Normal Saline   [] Keep Wound Dry in Shower     [] Wound Cleanser   [x] Cleanse wound with Mild Soap & Water    [] Other:       Topical Treatments:  Do not apply lotions, creams, or ointments to wound bed unless directed. [x] Apply moisturizing lotion to skin surrounding the wound prior to dressing change.  [] Apply antifungal ointment to skin surrounding the wound prior to dressing change.  [] Apply thin film of moisture barrier ointment to skin immediately around wound.   [] Apply Betadine to skin immediately around wound   [] Other:      Dressings:           Wound Location L Leg    [x] Apply Primary Dressing:       [x] MediHoney Gel [] MediHoney Alginate  [] Calcium Alginate with Silver   [] Calcium Alginate without silver   [] Collagen with silver [] Collagen without Silver    [] Santyl with moist saline gauze     [] Hydrofera Blue (cut to size and moistened with normal saline)  [] Hydrofera Blue Ready (cut to size)      [] Normal Saline wet to dry  [] Betadine wet to dry    [] Hydrogel  [] Mepitel     [] Bactroban/Mupirocin   [] Iodoform Packing Strip [] Plain Packing Strip   [] Skin Sub:   [] Other:      [x] Cover and Secure with:     [x] Gauze [x] Belkis or border [] Kerlix   [] Foam  []

## 2023-05-22 NOTE — WOUND CARE
[facility]   Medical Staff Progress Note     Mitchell Strickland  MEDICAL RECORD NUMBER:  [id number]  AGE: 80 y.o. GENDER: female  : 1931  EPISODE DATE:  2023    Chief complaint and reason for visit:   L medial ankle  Chief Complaint   Patient presents with    Wound Check     Left ankle      Patient presenting for follow up evaluation of wound(s) per chief complaint. Subjective: Symptoms, wound related issues, or other pertinent wound history since last visit: c/o pain in L ankle wound     Wound 23 Ankle Medial;Left #1 (Active)   Wound Image   23 145   Wound Etiology Arterial 23 145   Dressing Status Clean;Dry; Intact 23 145   Wound Cleansed Cleansed with saline 23 145   Wound Length (cm) 1.4 cm 23 145   Wound Width (cm) 0.5 cm 23 145   Wound Depth (cm) 0.1 cm 23 145   Wound Surface Area (cm^2) 0.7 cm^2 23 145   Change in Wound Size % (l*w) 0 23 145   Wound Volume (cm^3) 0.07 cm^3 23 145   Wound Healing % 0 23 145   Wound Assessment Granulation tissue;Slough 23 145   Drainage Amount Small 23 145   Drainage Description Serosanguinous 23 145   Odor None 23   Anamaria-wound Assessment Intact;Edematous 23 145   Margins Attached edges 23   Wound Thickness Description not for Pressure Injury Full thickness 23 145   Number of days: 49          Procedures done during this encounter:   none    TIME: E/M Time spent with patient and/or patient care issues: [] 15-20 min  [] 21-30 min  [] 31-44 min  [] 45 min or more.    This is above the usual time needed to address patient's chief complaint today: [] Yes  [] No  This time includes physician non-face-to-face service time visit on the date of service such as  Preparing to see the patient (eg, review of tests)  Obtaining and/or reviewing separately obtained

## 2023-06-05 ENCOUNTER — HOSPITAL ENCOUNTER (OUTPATIENT)
Facility: HOSPITAL | Age: 88
Discharge: HOME | End: 2023-06-05
Payer: MEDICARE

## 2023-06-05 VITALS
DIASTOLIC BLOOD PRESSURE: 80 MMHG | TEMPERATURE: 98 F | SYSTOLIC BLOOD PRESSURE: 147 MMHG | RESPIRATION RATE: 18 BRPM | HEART RATE: 83 BPM

## 2023-06-05 DIAGNOSIS — S91.001D ANKLE WOUND, RIGHT, SUBSEQUENT ENCOUNTER: Primary | ICD-10-CM

## 2023-06-05 PROCEDURE — 11042 DBRDMT SUBQ TIS 1ST 20SQCM/<: CPT

## 2023-06-05 RX ORDER — SODIUM CHLOR/HYPOCHLOROUS ACID 0.033 %
SOLUTION, IRRIGATION IRRIGATION ONCE
OUTPATIENT
Start: 2023-06-05 | End: 2023-06-05

## 2023-06-05 RX ORDER — LIDOCAINE HYDROCHLORIDE 20 MG/ML
JELLY TOPICAL ONCE
OUTPATIENT
Start: 2023-06-05 | End: 2023-06-05

## 2023-06-05 RX ORDER — LIDOCAINE 50 MG/G
OINTMENT TOPICAL PRN
Status: DISCONTINUED | OUTPATIENT
Start: 2023-06-05 | End: 2023-06-06 | Stop reason: HOSPADM

## 2023-06-05 NOTE — WOUND CARE
Wound Clinic Physician Orders and Discharge Instructions  [facility]  [address] , [address]  Telephone: [phone]    NAME:  Tee Conner  YOB: 1931  MEDICAL RECORD NUMBER:  [id number]  DATE:  6/5/2023      Return Appointment:  [] Dressing Supply Provider:   [x] Home Healthcare: Christine Irizarry  [x] Return Appointment: 2 Week(s)  [] Nurse Visit:     [] Discharge from [facility] [] Healed        [] Refer to Provider:         [] Consult    Follow-up Information:  [] Ordered Tests:   [] Referrals:   [] Rx:   [] Other:       Wound Cleansing:   Do not scrub or use excessive force. Cleanse wound prior to applying a clean dressing with:  [] Normal Saline   [] Keep Wound Dry in Shower     [] Wound Cleanser   [x] Cleanse wound with Mild Soap & Water    [] Other:       Topical Treatments:  Do not apply lotions, creams, or ointments to wound bed unless directed. [x] Apply moisturizing lotion to skin surrounding the wound prior to dressing change.  [] Apply antifungal ointment to skin surrounding the wound prior to dressing change.  [] Apply thin film of moisture barrier ointment to skin immediately around wound.   [] Apply Betadine to skin immediately around wound   [] Other:      Dressings:           Wound Location L Leg    [x] Apply Primary Dressing:       [] MediHoney Gel [] MediHoney Alginate  [] Calcium Alginate with Silver   [x] Calcium Alginate without silver   [] Collagen with silver [] Collagen without Silver    [] Santyl with moist saline gauze     [] Hydrofera Blue (cut to size and moistened with normal saline)  [] Hydrofera Blue Ready (cut to size)      [] Normal Saline wet to dry  [] Betadine wet to dry    [] Hydrogel  [] Mepitel     [] Bactroban/Mupirocin   [] Iodoform Packing Strip [] Plain Packing Strip   [] Skin Sub:   [] Other:      [x] Cover and Secure with:     [x] Gauze [x] Belkis or border [] Kerlix   [] Foam  []
is above the usual time needed to address patient's chief complaint today: [] Yes  [] No  This time includes physician non-face-to-face service time visit on the date of service such as  Preparing to see the patient (eg, review of tests)  Obtaining and/or reviewing separately obtained history  Performing a medically necessary appropriate examination and/or evaluation  Counseling and educating the patient/family/caregiver  Ordering medications, tests, or procedures  Referring and communicating with other health care professionals as needed  Documenting clinical information in the electronic or other health record  Independently interpreting results (not reported separately) and communicating results to the patient/family/caregiver  Care coordination (not reported separately)    HISTORY of PRESENT ILLNESS HPI     Juan Cortez is a 80 y.o. female who presents today for wound/ulcer evaluation. History of Wound Context: Per original history and physical on this patient. Changes in history since last evaluation: no change    Objective:    BP (!) 147/80   Pulse 83   Temp 98 °F (36.7 °C) (Oral)   Resp 18   Wt Readings from Last 3 Encounters:   05/15/23 53.5 kg (118 lb)   04/03/23 53.5 kg (118 lb)       PHYSICAL EXAM  General: Alert and in no acute distress. Normal appearing  Skin: Warm and dry, no rash  Head: Normocephalic and atraumatic  Eyes: Extraocular eye movements intact, conjunctivae normal, and sclera anicteric  ENT: Hearing grossly normal bilaterally. Normal appearance  Respiratory: no chest wall tenderness. no respiratory distress  GI: Abdomen non-tender and benign  Musculoskeletal: Baseline range of motion in joints. Nontender calves. No cyanosis. Edema:  Neurologic: Speech normal. At baseline without new focal deficits.  Mental status normal or at baseline  Wound: tiny, slough debrided    PAST MEDICAL HISTORY        Diagnosis Date    High cholesterol     High cholesterol     HTN (hypertension)

## 2023-06-05 NOTE — DISCHARGE INSTRUCTIONS
Wound Clinic Physician Orders and Discharge Instructions  [facility], [place] [address]  [address] , [address]  Telephone: [phone]    NAME:  Kenneth Palma  YOB: 1931  MEDICAL RECORD NUMBER:  [id number]  DATE:  6/5/2023      Return Appointment:  [] Dressing Supply Provider:   [x] Home Healthcare: Laura Diaz  [x] Return Appointment: 2 Week(s)  [] Nurse Visit:     [] Discharge from [facility]: [] Healed        [] Refer to Provider:         [] Consult    Follow-up Information:  [] Ordered Tests:   [] Referrals:   [] Rx:   [] Other:       Wound Cleansing:   Do not scrub or use excessive force. Cleanse wound prior to applying a clean dressing with:  [] Normal Saline   [] Keep Wound Dry in Shower     [] Wound Cleanser   [x] Cleanse wound with Mild Soap & Water    [] Other:       Topical Treatments:  Do not apply lotions, creams, or ointments to wound bed unless directed. [x] Apply moisturizing lotion to skin surrounding the wound prior to dressing change.  [] Apply antifungal ointment to skin surrounding the wound prior to dressing change.  [] Apply thin film of moisture barrier ointment to skin immediately around wound.   [] Apply Betadine to skin immediately around wound   [] Other:      Dressings:           Wound Location L Leg    [x] Apply Primary Dressing:       [] MediHoney Gel [] MediHoney Alginate  [] Calcium Alginate with Silver   [x] Calcium Alginate without silver   [] Collagen with silver [] Collagen without Silver    [] Santyl with moist saline gauze     [] Hydrofera Blue (cut to size and moistened with normal saline)  [] Hydrofera Blue Ready (cut to size)      [] Normal Saline wet to dry  [] Betadine wet to dry    [] Hydrogel  [] Mepitel     [] Bactroban/Mupirocin   [] Iodoform Packing Strip [] Plain Packing Strip   [] Skin Sub:   [] Other:      [x] Cover and Secure with:     [x] Gauze [x] Belkis or border [] Kerlix   [] Foam  []

## 2023-06-19 ENCOUNTER — HOSPITAL ENCOUNTER (OUTPATIENT)
Facility: HOSPITAL | Age: 88
Discharge: HOME | End: 2023-06-19
Payer: MEDICARE

## 2023-06-19 VITALS
RESPIRATION RATE: 18 BRPM | DIASTOLIC BLOOD PRESSURE: 63 MMHG | SYSTOLIC BLOOD PRESSURE: 135 MMHG | TEMPERATURE: 98.1 F | HEART RATE: 82 BPM

## 2023-06-19 DIAGNOSIS — S91.001D ANKLE WOUND, RIGHT, SUBSEQUENT ENCOUNTER: Primary | ICD-10-CM

## 2023-06-19 PROCEDURE — 99213 OFFICE O/P EST LOW 20 MIN: CPT

## 2023-06-19 RX ORDER — LIDOCAINE HYDROCHLORIDE 20 MG/ML
10 SOLUTION OROPHARYNGEAL AS NEEDED
Status: DISCONTINUED | OUTPATIENT
Start: 2023-06-19 | End: 2023-06-20 | Stop reason: HOSPADM

## 2023-06-19 RX ORDER — SODIUM CHLOR/HYPOCHLOROUS ACID 0.033 %
SOLUTION, IRRIGATION IRRIGATION ONCE
OUTPATIENT
Start: 2023-06-19 | End: 2023-06-19

## 2023-06-19 RX ORDER — LIDOCAINE HYDROCHLORIDE 20 MG/ML
JELLY TOPICAL ONCE
OUTPATIENT
Start: 2023-06-19 | End: 2023-06-19

## 2023-06-19 NOTE — DISCHARGE INSTRUCTIONS
Wound Clinic Physician Orders and Discharge Instructions  [facility]  [address], [address] [facility] [address], [address] [facility]  Telephone: [phone]    NAME:  Mikey Castrejon  YOB: 1931  MEDICAL RECORD NUMBER:  [id number]  DATE:  6/19/2023      Return Appointment:  [] Dressing Supply Provider:   [x] Home Healthcare: Sam Valderrama  [x] Return Appointment: 1 Week(s)  [] Nurse Visit:     [] Discharge from [facility]: [] Healed        [] Refer to Provider:         [] Consult    Follow-up Information:  [] Ordered Tests:   [] Referrals:   [] Rx:   [] Other:       Wound Cleansing:   Do not scrub or use excessive force. Cleanse wound prior to applying a clean dressing with:  [] Normal Saline   [] Keep Wound Dry in Shower     [] Wound Cleanser   [x] Cleanse wound with Mild Soap & Water    [] Other:       Topical Treatments:  Do not apply lotions, creams, or ointments to wound bed unless directed. [x] Apply moisturizing lotion to skin surrounding the wound prior to dressing change.  [] Apply antifungal ointment to skin surrounding the wound prior to dressing change.  [] Apply thin film of moisture barrier ointment to skin immediately around wound.   [] Apply Betadine to skin immediately around wound   [] Other:      Dressings:           Wound Location L Leg    [x] Apply Primary Dressing:       [] MediHoney Gel [] MediHoney Alginate  [] Calcium Alginate with Silver   [x] Calcium Alginate without silver   [] Collagen with silver [] Collagen without Silver    [] Santyl with moist saline gauze     [] Hydrofera Blue (cut to size and moistened with normal saline)  [] Hydrofera Blue Ready (cut to size)      [] Normal Saline wet to dry  [] Betadine wet to dry    [] Hydrogel  [] Mepitel     [] Bactroban/Mupirocin   [] Iodoform Packing Strip [] Plain Packing Strip   [] Skin Sub:   [] Other:      [x] Cover and Secure with:     [x] Gauze [x] Belkis or border [] Kerlix   [] Foam  []

## 2023-06-26 ENCOUNTER — HOSPITAL ENCOUNTER (OUTPATIENT)
Facility: HOSPITAL | Age: 88
Discharge: HOME | End: 2023-06-26
Payer: MEDICARE

## 2023-06-26 VITALS
TEMPERATURE: 97.8 F | SYSTOLIC BLOOD PRESSURE: 138 MMHG | DIASTOLIC BLOOD PRESSURE: 62 MMHG | HEART RATE: 78 BPM | RESPIRATION RATE: 18 BRPM

## 2023-06-26 DIAGNOSIS — S91.001D ANKLE WOUND, RIGHT, SUBSEQUENT ENCOUNTER: Primary | ICD-10-CM

## 2023-06-26 PROCEDURE — 99213 OFFICE O/P EST LOW 20 MIN: CPT

## 2023-06-26 RX ORDER — LIDOCAINE HYDROCHLORIDE 20 MG/ML
15 SOLUTION OROPHARYNGEAL PRN
Status: DISCONTINUED | OUTPATIENT
Start: 2023-06-26 | End: 2023-06-27 | Stop reason: HOSPADM

## 2023-06-26 RX ORDER — LIDOCAINE HYDROCHLORIDE 20 MG/ML
JELLY TOPICAL ONCE
OUTPATIENT
Start: 2023-06-26 | End: 2023-06-26

## 2023-06-26 RX ORDER — SODIUM CHLOR/HYPOCHLOROUS ACID 0.033 %
SOLUTION, IRRIGATION IRRIGATION ONCE
OUTPATIENT
Start: 2023-06-26 | End: 2023-06-26

## 2023-07-10 ENCOUNTER — HOSPITAL ENCOUNTER (OUTPATIENT)
Facility: HOSPITAL | Age: 88
Discharge: HOME | End: 2023-07-10
Payer: MEDICARE

## 2023-07-10 VITALS — HEART RATE: 82 BPM | RESPIRATION RATE: 18 BRPM | TEMPERATURE: 98.6 F

## 2023-07-10 DIAGNOSIS — S91.001D ANKLE WOUND, RIGHT, SUBSEQUENT ENCOUNTER: Primary | ICD-10-CM

## 2023-07-10 PROCEDURE — 99213 OFFICE O/P EST LOW 20 MIN: CPT

## 2023-07-10 RX ORDER — SODIUM CHLOR/HYPOCHLOROUS ACID 0.033 %
SOLUTION, IRRIGATION IRRIGATION ONCE
OUTPATIENT
Start: 2023-07-10 | End: 2023-07-10

## 2023-07-10 RX ORDER — LIDOCAINE HYDROCHLORIDE 20 MG/ML
JELLY TOPICAL ONCE
OUTPATIENT
Start: 2023-07-10 | End: 2023-07-10

## 2023-07-10 RX ORDER — ASPIRIN 81 MG/1
81 TABLET, CHEWABLE ORAL 2 TIMES DAILY
COMMUNITY

## 2023-07-10 NOTE — DISCHARGE INSTRUCTIONS
Wound Clinic Physician Orders and Discharge Instructions  [address] [place] [address] S. [address]  Telephone: [phone]    NAME:  Kizzy Vargas  YOB: 1931  MEDICAL RECORD NUMBER:  [id number]  DATE:  7/10/2023      Return Appointment:  [] Dressing Supply Provider:   [x] Home Healthcare: Darius Mesa  [x] Return Appointment: 1 Week(s)  [] Nurse Visit:     [] Discharge from [facility]: [] Healed        [] Refer to Provider:         [] Consult    Follow-up Information:  [] Ordered Tests:   [] Referrals:   [] Rx:   [] Other:       Wound Cleansing:   Do not scrub or use excessive force. Cleanse wound prior to applying a clean dressing with:  [] Normal Saline   [] Keep Wound Dry in Shower     [] Wound Cleanser   [x] Cleanse wound with Mild Soap & Water    [] Other:       Topical Treatments:  Do not apply lotions, creams, or ointments to wound bed unless directed. [x] Apply moisturizing lotion to skin surrounding the wound prior to dressing change.  [] Apply antifungal ointment to skin surrounding the wound prior to dressing change.  [] Apply thin film of moisture barrier ointment to skin immediately around wound.   [] Apply Betadine to skin immediately around wound   [] Other:      Dressings:           Wound Location L Leg    [x] Apply Primary Dressing:       [] MediHoney Gel [] MediHoney Alginate  [] Calcium Alginate with Silver   [x] Calcium Alginate without silver   [] Collagen with silver [] Collagen without Silver    [] Santyl with moist saline gauze     [] Hydrofera Blue (cut to size and moistened with normal saline)  [] Hydrofera Blue Ready (cut to size)      [] Normal Saline wet to dry  [] Betadine wet to dry    [] Hydrogel  [] Mepitel     [] Bactroban/Mupirocin   [] Iodoform Packing Strip [] Plain Packing Strip   [] Skin Sub:   [] Other:      [x] Cover and Secure with:     [x] Gauze [x] Belkis or border [] Kerlix   [] Foam  []

## 2023-07-10 NOTE — WOUND CARE
[facility] [place] [facility]   Medical Staff Progress Note     Kristeen Cheadle  MEDICAL RECORD NUMBER:  [id number]  AGE: 80 y.o. GENDER: female  : 1931  EPISODE DATE:  7/10/2023    Chief complaint and reason for visit:   LLE  Chief Complaint   Patient presents with    Wound Check     Left ankle      Patient presenting for follow up evaluation of wound(s) per chief complaint. Subjective: Symptoms, wound related issues, or other pertinent wound history since last visit: has two new wounds on L shin, traumatic. C/o pain LLE     Wound 23 Ankle Medial;Left #1 (Active)   Wound Image   07/10/23 1332   Wound Etiology Arterial 07/10/23 1332   Dressing Status Clean;Dry; Intact 07/10/23 133   Wound Cleansed Cleansed with saline 07/10/23 133   Dressing/Treatment Other (comment) 07/10/23 1359   Wound Length (cm) 0.2 cm 07/10/23 1332   Wound Width (cm) 0.3 cm 07/10/23 1332   Wound Depth (cm) 0.1 cm 07/10/23 1332   Wound Surface Area (cm^2) 0.06 cm^2 07/10/23 1332   Change in Wound Size % (l*w) 91.43 07/10/23 1332   Wound Volume (cm^3) 0.006 cm^3 07/10/23 1332   Wound Healing % 91 07/10/23 1332   Post-Procedure Length (cm) 0.5 cm 23 1425   Post-Procedure Width (cm) 0.3 cm 23 1425   Post-Procedure Depth (cm) 0.1 cm 23 1425   Post-Procedure Surface Area (cm^2) 0.15 cm^2 23 1425   Post-Procedure Volume (cm^3) 0.015 cm^3 23 1425   Wound Assessment Granulation tissue;Slough 07/10/23 1332   Drainage Amount Small 07/10/23 1332   Drainage Description Serosanguinous 07/10/23 1332   Odor None 07/10/23 1332   Anamaria-wound Assessment Dry/flaky;Fragile 07/10/23 1332   Margins Attached edges 07/10/23 1332   Wound Thickness Description not for Pressure Injury Full thickness 07/10/23 1332   Number of days: 98       Wound 07/10/23 Leg Left #2 (Active)   Wound Image   07/10/23 1330   Wound Etiology Traumatic 07/10/23 1330   Dressing Status Old

## 2023-07-10 NOTE — WOUND CARE
Wound Clinic Physician Orders and Discharge Instructions  [address] [place] [address] [address]  Telephone: [phone]    NAME:  Jerry Ellis  YOB: 1931  MEDICAL RECORD NUMBER:  [id number]  DATE:  7/10/2023      Return Appointment:  [] Dressing Supply Provider:   [x] Home Healthcare: Cinthia Plaza  [x] Return Appointment: 1 Week(s)  [] Nurse Visit:     [] Discharge from [facility]: [] Healed        [] Refer to Provider:         [] Consult    Follow-up Information:  [] Ordered Tests:   [] Referrals:   [] Rx:   [] Other:       Wound Cleansing:   Do not scrub or use excessive force. Cleanse wound prior to applying a clean dressing with:  [] Normal Saline   [] Keep Wound Dry in Shower     [] Wound Cleanser   [x] Cleanse wound with Mild Soap & Water    [] Other:       Topical Treatments:  Do not apply lotions, creams, or ointments to wound bed unless directed. [x] Apply moisturizing lotion to skin surrounding the wound prior to dressing change.  [] Apply antifungal ointment to skin surrounding the wound prior to dressing change.  [] Apply thin film of moisture barrier ointment to skin immediately around wound.   [] Apply Betadine to skin immediately around wound   [] Other:      Dressings:           Wound Location L Leg    [x] Apply Primary Dressing:       [] MediHoney Gel [] MediHoney Alginate  [] Calcium Alginate with Silver   [x] Calcium Alginate without silver   [] Collagen with silver [] Collagen without Silver    [] Santyl with moist saline gauze     [] Hydrofera Blue (cut to size and moistened with normal saline)  [] Hydrofera Blue Ready (cut to size)      [] Normal Saline wet to dry  [] Betadine wet to dry    [] Hydrogel  [] Mepitel     [] Bactroban/Mupirocin   [] Iodoform Packing Strip [] Plain Packing Strip   [] Skin Sub:   [] Other:      [x] Cover and Secure with:     [x] Gauze [x] Belkis or border [] Kerlix   [] Foam  []

## 2023-07-17 ENCOUNTER — HOSPITAL ENCOUNTER (OUTPATIENT)
Facility: HOSPITAL | Age: 88
Discharge: HOME | End: 2023-07-17
Payer: MEDICARE

## 2023-07-17 VITALS
SYSTOLIC BLOOD PRESSURE: 150 MMHG | TEMPERATURE: 97.6 F | HEART RATE: 84 BPM | DIASTOLIC BLOOD PRESSURE: 64 MMHG | RESPIRATION RATE: 18 BRPM

## 2023-07-17 DIAGNOSIS — S91.001D ANKLE WOUND, RIGHT, SUBSEQUENT ENCOUNTER: Primary | ICD-10-CM

## 2023-07-17 PROCEDURE — 99213 OFFICE O/P EST LOW 20 MIN: CPT

## 2023-07-17 RX ORDER — LIDOCAINE HYDROCHLORIDE 20 MG/ML
JELLY TOPICAL ONCE
OUTPATIENT
Start: 2023-07-17 | End: 2023-07-17

## 2023-07-17 RX ORDER — SODIUM CHLOR/HYPOCHLOROUS ACID 0.033 %
SOLUTION, IRRIGATION IRRIGATION ONCE
OUTPATIENT
Start: 2023-07-17 | End: 2023-07-17

## 2023-07-17 ASSESSMENT — PAIN DESCRIPTION - DESCRIPTORS: DESCRIPTORS: BURNING

## 2023-07-17 ASSESSMENT — PAIN DESCRIPTION - ORIENTATION: ORIENTATION: LOWER;LEFT

## 2023-07-17 ASSESSMENT — PAIN DESCRIPTION - LOCATION: LOCATION: LEG

## 2023-07-17 ASSESSMENT — PAIN SCALES - GENERAL: PAINLEVEL_OUTOF10: 5

## 2023-07-17 NOTE — WOUND CARE
Wound Clinic Physician Orders and Discharge Instructions  [address] [place], [place] [address] [address]  Telephone: [phone]    NAME:  Komal Krueger  YOB: 1931  MEDICAL RECORD NUMBER:  [id number]  DATE:  7/17/2023      Return Appointment:  [] Dressing Supply Provider:   [x] Home Healthcare: David Walker  [x] Return Appointment: 1 Week(s)  [] Nurse Visit:     [] Discharge from [facility]: [] Healed        [] Refer to Provider:         [] Consult    Follow-up Information:  [] Ordered Tests:   [] Referrals:   [] Rx:   [] Other:       Wound Cleansing:   Do not scrub or use excessive force. Cleanse wound prior to applying a clean dressing with:  [] Normal Saline   [] Keep Wound Dry in Shower     [] Wound Cleanser   [x] Cleanse wound with Mild Soap & Water    [] Other:       Topical Treatments:  Do not apply lotions, creams, or ointments to wound bed unless directed. [x] Apply moisturizing lotion to skin surrounding the wound prior to dressing change.  [] Apply antifungal ointment to skin surrounding the wound prior to dressing change.  [] Apply thin film of moisture barrier ointment to skin immediately around wound.   [] Apply Betadine to skin immediately around wound   [] Other:      Dressings:           Wound Location L Leg anterior    [x] Apply Primary Dressing:       [x] MediHoney Gel [] MediHoney Alginate  [] Calcium Alginate with Silver   [] Calcium Alginate without silver   [] Collagen with silver [] Collagen without Silver    [] Santyl with moist saline gauze     [] Hydrofera Blue (cut to size and moistened with normal saline)  [] Hydrofera Blue Ready (cut to size)      [] Normal Saline wet to dry  [] Betadine wet to dry    [] Hydrogel  [] Mepitel     [] Bactroban/Mupirocin   [] Iodoform Packing Strip [] Plain Packing Strip   [] Skin Sub:   [] Other:      [x] Cover and Secure with:     [x] Gauze [x] Belkis or border [] Kerlix   []

## 2023-07-17 NOTE — WOUND CARE
[address] [place] [facility]   Medical Staff Progress Note     Jerry Ellis  MEDICAL RECORD NUMBER:  [id number]  AGE: 80 y.o. GENDER: female  : 1931  EPISODE DATE:  2023    Chief complaint and reason for visit:   L leg wounds  Chief Complaint   Patient presents with    Wound Check     Left leg      Patient presenting for follow up evaluation of wound(s) per chief complaint. Subjective: Symptoms, wound related issues, or other pertinent wound history since last visit: continues to c/o sharp pain in L leg radiating proximally. No fever, no drainage     Wound 07/10/23 Leg Left #2 (Active)   Wound Image   23 1333   Wound Etiology Traumatic 23 1333   Dressing Status Old drainage noted 23 1333   Wound Cleansed Cleansed with saline 23 1333   Dressing/Treatment Other (comment) 23 1359   Wound Length (cm) 10 cm 23 1333   Wound Width (cm) 3 cm 23 1333   Wound Depth (cm) 0.1 cm 23 1333   Wound Surface Area (cm^2) 30 cm^2 23 1333   Change in Wound Size % (l*w) -55.84 23 1333   Wound Volume (cm^3) 3 cm^3 233   Wound Healing % -56 23   Wound Assessment Granulation tissue;Slough 23   Drainage Amount Moderate 23 1333   Drainage Description Sanguinous 23 1333   Odor None 23   Anamaria-wound Assessment Maceration; Intact 23 1333   Margins Attached edges; Undefined edges 23   Wound Thickness Description not for Pressure Injury Full thickness 23 1333   Number of days: 7          Procedures done during this encounter:   none    TIME: E/M Time spent with patient and/or patient care issues: [] 15-20 min  [] 21-30 min  [] 31-44 min  [] 45 min or more.    This is above the usual time needed to address patient's chief complaint today: [] Yes  [] No  This time includes physician non-face-to-face service time visit on the date of service

## 2023-07-17 NOTE — DISCHARGE INSTRUCTIONS
Wound Clinic Physician Orders and Discharge Instructions  [address] [place] [address] [address]  Telephone: [phone]    NAME:  Binta Grayson  YOB: 1931  MEDICAL RECORD NUMBER:  [id number]  DATE:  7/17/2023      Return Appointment:  [] Dressing Supply Provider:   [x] Home Healthcare: Shannen Adames  [x] Return Appointment: 1 Week(s)  [] Nurse Visit:     [] Discharge from [facility]: [] Healed        [] Refer to Provider:         [] Consult    Follow-up Information:  [] Ordered Tests:   [] Referrals:   [] Rx:   [] Other:       Wound Cleansing:   Do not scrub or use excessive force. Cleanse wound prior to applying a clean dressing with:  [] Normal Saline   [] Keep Wound Dry in Shower     [] Wound Cleanser   [x] Cleanse wound with Mild Soap & Water    [] Other:       Topical Treatments:  Do not apply lotions, creams, or ointments to wound bed unless directed. [x] Apply moisturizing lotion to skin surrounding the wound prior to dressing change.  [] Apply antifungal ointment to skin surrounding the wound prior to dressing change.  [] Apply thin film of moisture barrier ointment to skin immediately around wound.   [] Apply Betadine to skin immediately around wound   [] Other:      Dressings:           Wound Location L Leg anterior    [x] Apply Primary Dressing:       [x] MediHoney Gel [] MediHoney Alginate  [] Calcium Alginate with Silver   [] Calcium Alginate without silver   [] Collagen with silver [] Collagen without Silver    [] Santyl with moist saline gauze     [] Hydrofera Blue (cut to size and moistened with normal saline)  [] Hydrofera Blue Ready (cut to size)      [] Normal Saline wet to dry  [] Betadine wet to dry    [] Hydrogel  [] Mepitel     [] Bactroban/Mupirocin   [] Iodoform Packing Strip [] Plain Packing Strip   [] Skin Sub:   [] Other:      [x] Cover and Secure with:     [x] Gauze [x] Belkis or border [] Kerlix   [] Foam

## 2023-07-24 ENCOUNTER — HOSPITAL ENCOUNTER (OUTPATIENT)
Facility: HOSPITAL | Age: 88
Discharge: HOME | End: 2023-07-24
Payer: MEDICARE

## 2023-07-24 VITALS
HEART RATE: 78 BPM | RESPIRATION RATE: 18 BRPM | TEMPERATURE: 99.3 F | SYSTOLIC BLOOD PRESSURE: 132 MMHG | DIASTOLIC BLOOD PRESSURE: 72 MMHG

## 2023-07-24 DIAGNOSIS — S91.001D ANKLE WOUND, RIGHT, SUBSEQUENT ENCOUNTER: Primary | ICD-10-CM

## 2023-07-24 PROCEDURE — 99213 OFFICE O/P EST LOW 20 MIN: CPT

## 2023-07-24 RX ORDER — SODIUM CHLOR/HYPOCHLOROUS ACID 0.033 %
SOLUTION, IRRIGATION IRRIGATION ONCE
OUTPATIENT
Start: 2023-07-24 | End: 2023-07-24

## 2023-07-24 RX ORDER — LIDOCAINE HYDROCHLORIDE 20 MG/ML
JELLY TOPICAL ONCE
OUTPATIENT
Start: 2023-07-24 | End: 2023-07-24

## 2023-07-24 ASSESSMENT — PAIN SCALES - GENERAL: PAINLEVEL_OUTOF10: 2

## 2023-07-24 NOTE — WOUND CARE
Wound Clinic Physician Orders and Discharge Instructions  [address] [place] [address] S. [address]  Telephone: [phone]    NAME:  Kimber Spurling  YOB: 1931  MEDICAL RECORD NUMBER:  [id number]  DATE:  7/24/2023      Return Appointment:  [] Dressing Supply Provider:   [x] Home Healthcare: Georgette Wan  [x] Return Appointment: 1 Week(s)  [] Nurse Visit:     [] Discharge from [facility]: [] Healed        [] Refer to Provider:         [] Consult    Follow-up Information:  [] Ordered Tests:   [] Referrals:   [] Rx:   [] Other:       Wound Cleansing:   Do not scrub or use excessive force. Cleanse wound prior to applying a clean dressing with:  [] Normal Saline   [] Keep Wound Dry in Shower     [] Wound Cleanser   [x] Cleanse wound with Mild Soap & Water    [] Other:       Topical Treatments:  Do not apply lotions, creams, or ointments to wound bed unless directed. [x] Apply moisturizing lotion to skin surrounding the wound prior to dressing change.  [] Apply antifungal ointment to skin surrounding the wound prior to dressing change.  [] Apply thin film of moisture barrier ointment to skin immediately around wound.   [] Apply Betadine to skin immediately around wound   [] Other:      Dressings:           Wound Location L Leg anterior    [x] Apply Primary Dressing:       [] MediHoney Gel [] MediHoney Alginate  [] Calcium Alginate with Silver   [] Calcium Alginate without silver   [] Collagen with silver [] Collagen without Silver    [] Santyl with moist saline gauze     [] Hydrofera Blue (cut to size and moistened with normal saline)  [] Hydrofera Blue Ready (cut to size)      [] Normal Saline wet to dry  [] Betadine wet to dry    [] Hydrogel  [] Mepitel     [] Bactroban/Mupirocin   [] Iodoform Packing Strip [] Plain Packing Strip   [] Skin Sub:   [x] Other:  xeroform    [x] Cover and Secure with:     [x] Gauze [x] Belkis or border []
Wound Clinic Physician Orders and Discharge Instructions  [address] [place] [address] S. [address]  Telephone: [phone]    NAME:  Linette Garcia  YOB: 1931  MEDICAL RECORD NUMBER:  [id number]  DATE:  7/24/2023      Return Appointment:  [] Dressing Supply Provider:   [x] Home Healthcare: Mariana Lynn  [x] Return Appointment: 1 Week(s)  [] Nurse Visit:     [] Discharge from [facility]: [] Healed        [] Refer to Provider:         [] Consult    Follow-up Information:  [] Ordered Tests:   [] Referrals:   [] Rx:   [] Other:       Wound Cleansing:   Do not scrub or use excessive force. Cleanse wound prior to applying a clean dressing with:  [] Normal Saline   [] Keep Wound Dry in Shower     [] Wound Cleanser   [x] Cleanse wound with Mild Soap & Water    [] Other:       Topical Treatments:  Do not apply lotions, creams, or ointments to wound bed unless directed. [x] Apply moisturizing lotion to skin surrounding the wound prior to dressing change.  [] Apply antifungal ointment to skin surrounding the wound prior to dressing change.  [] Apply thin film of moisture barrier ointment to skin immediately around wound.   [] Apply Betadine to skin immediately around wound   [] Other:      Dressings:           Wound Location L Leg anterior    [x] Apply Primary Dressing:       [] MediHoney Gel [] MediHoney Alginate  [] Calcium Alginate with Silver   [] Calcium Alginate without silver   [] Collagen with silver [] Collagen without Silver    [] Santyl with moist saline gauze     [] Hydrofera Blue (cut to size and moistened with normal saline)  [] Hydrofera Blue Ready (cut to size)      [] Normal Saline wet to dry  [] Betadine wet to dry    [] Hydrogel  [] Mepitel     [] Bactroban/Mupirocin   [] Iodoform Packing Strip [] Plain Packing Strip   [] Skin Sub:   [x] Other:  xeroform    [x] Cover and Secure with:     [x] Gauze [x] Belkis or

## 2023-07-24 NOTE — DISCHARGE INSTRUCTIONS
Kerlix   [] Foam  [] Super Absorbant [] ABD     [] Ace Wrap [] Other:    Limit contact of tape with skin. [x] Change dressing: [] Daily    [x] Every Other Day   [] Twice per week   [] Three times per week   [] Once a week [] Do Not Change Dressing   [] Other:   Edema Control:  Apply: [x] Compression Stocking:  mmHg  [x]Right Leg []Left Leg   [x] Tubigrip []Right Leg Double Layer []Left Leg Double Layer      []Right Leg Single Layer [x]Left Leg Single Layer      [x] Elevate leg(s) above the level of the heart when sitting. [x] Avoid prolonged standing in one place. Dietary:  [x] Diet as tolerated: [] Calorie Diabetic Diet: [] No Added Salt:  [x] Increase Protein: [] Other:     Activity:  [x] Activity as tolerated:    [] Patient has no activity restrictions      [] Strict Bedrest   [] Remain off Work      [] May return to full duty work                                     [] Return to work with restrictions     Physician:  [x] Dr. Oumar Nuñez  [] Dr. Rommel Hopkins  [] Dr. Mercedes Diehl      Nurse Case Manger:  823 [facility] Information: Should you experience any significant changes in your wound(s) or have questions about your wound care, please contact the [facility] at [phone]. Our hours are Monday - Friday 8am - 4:30pm, closed all major holidays. If you need help with your wound outside these hours and cannot wait until we are again available, contact your PCP or go to the hospital emergency room. PLEASE NOTE: IF YOU ARE UNABLE TO OBTAIN WOUND SUPPLIES, CONTINUE TO USE THE SUPPLIES YOU HAVE AVAILABLE UNTIL YOU ARE ABLE TO REACH US. IT IS MOST IMPORTANT TO KEEP THE WOUND COVERED AT ALL TIMES.

## 2023-07-31 ENCOUNTER — HOSPITAL ENCOUNTER (OUTPATIENT)
Facility: HOSPITAL | Age: 88
Discharge: HOME | End: 2023-07-31
Payer: MEDICARE

## 2023-07-31 VITALS
SYSTOLIC BLOOD PRESSURE: 146 MMHG | TEMPERATURE: 97.9 F | HEART RATE: 79 BPM | DIASTOLIC BLOOD PRESSURE: 64 MMHG | RESPIRATION RATE: 18 BRPM

## 2023-07-31 DIAGNOSIS — S81.802D TRAUMATIC OPEN WOUND OF LEFT LOWER LEG WITH DELAYED HEALING: ICD-10-CM

## 2023-07-31 DIAGNOSIS — S91.001D ANKLE WOUND, RIGHT, SUBSEQUENT ENCOUNTER: Primary | ICD-10-CM

## 2023-07-31 PROCEDURE — 11042 DBRDMT SUBQ TIS 1ST 20SQCM/<: CPT

## 2023-07-31 PROCEDURE — 11045 DBRDMT SUBQ TISS EACH ADDL: CPT

## 2023-07-31 RX ORDER — LIDOCAINE HYDROCHLORIDE 20 MG/ML
JELLY TOPICAL ONCE
OUTPATIENT
Start: 2023-07-31 | End: 2023-07-31

## 2023-07-31 RX ORDER — SODIUM CHLOR/HYPOCHLOROUS ACID 0.033 %
SOLUTION, IRRIGATION IRRIGATION ONCE
OUTPATIENT
Start: 2023-07-31 | End: 2023-07-31

## 2023-07-31 NOTE — CONSULTS
ointment to skin surrounding the wound prior to dressing change.  [] Apply thin film of moisture barrier ointment to skin immediately around wound. [] Apply Betadine to skin immediately around wound   [] Other:      Dressings:           Wound Location L Leg anterior    [x] Apply Primary Dressing:       [] MediHoney Gel [] MediHoney Alginate  [] Calcium Alginate with Silver   [] Calcium Alginate without silver   [] Collagen with silver [] Collagen without Silver    [] Santyl with moist saline gauze     [x] Hydrofera Blue (cut to size and moistened with normal saline)  [] Hydrofera Blue Ready (cut to size)      [] Normal Saline wet to dry  [] Betadine wet to dry    [] Hydrogel  [] Mepitel     [] Bactroban/Mupirocin   [] Iodoform Packing Strip [] Plain Packing Strip   [] Skin Sub:   [] Other:      [x] Cover and Secure with:     [x] Gauze [x] Belkis  [] Kerlix   [] Foam  [] Super Absorbant [] ABD     [] Ace Wrap [] Other:    Limit contact of tape with skin. [x] Change dressing: [] Daily    [x] Every Other Day   [] Twice per week   [x] Three times per week   [] Once a week [] Do Not Change Dressing   [] Other:   Edema Control:  Apply: [x] Compression Stocking:  mmHg  [x]Right Leg []Left Leg   [x] Tubigrip []Right Leg Double Layer []Left Leg Double Layer      []Right Leg Single Layer [x]Left Leg Single Layer      [x] Elevate leg(s) above the level of the heart when sitting. [x] Avoid prolonged standing in one place.      Dietary:  [x] Diet as tolerated: [] Calorie Diabetic Diet: [] No Added Salt:  [x] Increase Protein: [] Other:     Activity:  [x] Activity as tolerated:    [] Patient has no activity restrictions      [] Strict Bedrest   [] Remain off Work      [] May return to full duty work                                     [] Return to work with restrictions     Physician:  [] Dr. Daria Torres  [] Dr. Prashanth Jackson  [x] Dr. Hernandez Million      Nurse Case Manger:  Zhen Michele Information:

## 2023-07-31 NOTE — OP NOTE
Procedure Note  Indications: Based on my examination of this patient's wound(s)/ulcer(s) today, debridement is required to promote healing and evaluate the wound base. Debridement: Excisional Debridement    Using: curette, scissors, and forceps the wound(s)/ulcer(s) was/were debrided down through and including the removal of epidermis, dermis, and subcutaneous tissue. Performed by: Ulysses Proctor MD  Consent obtained: Yes  Time out taken: Yes  Pain Control: Anesthetic  Anesthetic: 4% Lidocaine Liquid Topical       Devitalized Tissue Debrided: fibrin, biofilm, and slough    Pre Debridement Measurements:  Are located in the [facility]  Documentation Flow Sheet    Diabetic/Pressure/Non Pressure Ulcers only:  Ulcer: Other: left leg traumatic open wound      Wound/Ulcer #: 2  Post Debridement Measurements:  Wound/Ulcer Descriptions are Pre Debridement except measurements:  Wound 07/10/23 Leg Left #2 (Active)   Wound Image   07/31/23 1126   Wound Etiology Traumatic 07/31/23 1126   Dressing Status Clean;Dry; Intact 07/31/23 1126   Wound Cleansed Cleansed with saline 07/31/23 1126   Dressing/Treatment Other (comment) 07/17/23 1359   Wound Length (cm) 7 cm 07/31/23 1126   Wound Width (cm) 3 cm 07/31/23 1126   Wound Depth (cm) 0.1 cm 07/31/23 1126   Wound Surface Area (cm^2) 21 cm^2 07/31/23 1126   Change in Wound Size % (l*w) -9.09 07/31/23 1126   Wound Volume (cm^3) 2.1 cm^3 07/31/23 1126   Wound Healing % -9 07/31/23 1126   Post-Procedure Length (cm) 7.1 cm 07/31/23 1214   Post-Procedure Width (cm) 3.1 cm 07/31/23 1214   Post-Procedure Depth (cm) 0.2 cm 07/31/23 1214   Post-Procedure Surface Area (cm^2) 22.01 cm^2 07/31/23 1214   Post-Procedure Volume (cm^3) 4.402 cm^3 07/31/23 1214   Wound Assessment Granulation tissue;Slough 07/31/23 1126   Drainage Amount Moderate 07/31/23 1126   Drainage Description Serosanguinous 07/31/23 1126   Odor None 07/31/23 1126   Anamaria-wound Assessment Intact 07/31/23 1126   Margins

## 2023-07-31 NOTE — DISCHARGE INSTRUCTIONS
Wound Clinic Physician Orders and Discharge Instructions  [address] [place] [address] S. [address]  Telephone: [phone]    NAME:  Charlotte Amor  YOB: 1931  MEDICAL RECORD NUMBER:  [id number]  DATE:  7/31/2023      Return Appointment:  [] Dressing Supply Provider:   [x] Home Healthcare: Sharla Hernandez  [x] Return Appointment: 1 Week(s)  [] Nurse Visit:     [] Discharge from [facility]: [] Healed        [] Refer to Provider:         [] Consult    Follow-up Information:  [] Ordered Tests:   [] Referrals:   [] Rx:   [] Other:       Wound Cleansing:   Do not scrub or use excessive force. Cleanse wound prior to applying a clean dressing with:  [] Normal Saline   [] Keep Wound Dry in Shower     [] Wound Cleanser   [x] Cleanse wound with Mild Soap & Water    [] Other:       Topical Treatments:  Do not apply lotions, creams, or ointments to wound bed unless directed. [x] Apply moisturizing lotion to skin surrounding the wound prior to dressing change.  [] Apply antifungal ointment to skin surrounding the wound prior to dressing change.  [] Apply thin film of moisture barrier ointment to skin immediately around wound.   [] Apply Betadine to skin immediately around wound   [] Other:      Dressings:           Wound Location L Leg anterior    [x] Apply Primary Dressing:       [] MediHoney Gel [] MediHoney Alginate  [] Calcium Alginate with Silver   [] Calcium Alginate without silver   [] Collagen with silver [] Collagen without Silver    [] Santyl with moist saline gauze     [x] Hydrofera Blue (cut to size and moistened with normal saline)  [] Hydrofera Blue Ready (cut to size)      [] Normal Saline wet to dry  [] Betadine wet to dry    [] Hydrogel  [] Mepitel     [] Bactroban/Mupirocin   [] Iodoform Packing Strip [] Plain Packing Strip   [] Skin Sub:   [] Other:      [x] Cover and Secure with:     [x] Gauze [x] Belkis  [] Kerlix   [] Foam  []

## 2023-07-31 NOTE — WOUND CARE
Wound Clinic Physician Orders and Discharge Instructions  [address] [place] [address] S. [address]  Telephone: [phone]    NAME:  Iwona Calhoun  YOB: 1931  MEDICAL RECORD NUMBER:  [id number]  DATE:  7/31/2023      Return Appointment:  [] Dressing Supply Provider:   [x] Home Healthcare: Sohail Burnett  [x] Return Appointment: 1 Week(s)  [] Nurse Visit:     [] Discharge from [facility]: [] Healed        [] Refer to Provider:         [] Consult    Follow-up Information:  [] Ordered Tests:   [] Referrals:   [] Rx:   [x] Other: can take Advil or Motrin 400mg once or twice a day for pain as needed      Wound Cleansing:   Do not scrub or use excessive force. Cleanse wound prior to applying a clean dressing with:  [] Normal Saline   [] Keep Wound Dry in Shower     [] Wound Cleanser   [x] Cleanse wound with Mild Soap & Water    [] Other:       Topical Treatments:  Do not apply lotions, creams, or ointments to wound bed unless directed. [x] Apply moisturizing lotion to skin surrounding the wound prior to dressing change.  [] Apply antifungal ointment to skin surrounding the wound prior to dressing change.  [] Apply thin film of moisture barrier ointment to skin immediately around wound.   [] Apply Betadine to skin immediately around wound   [] Other:      Dressings:           Wound Location L Leg anterior    [x] Apply Primary Dressing:       [] MediHoney Gel [] MediHoney Alginate  [] Calcium Alginate with Silver   [] Calcium Alginate without silver   [] Collagen with silver [] Collagen without Silver    [] Santyl with moist saline gauze     [x] Hydrofera Blue (cut to size and moistened with normal saline)  [] Hydrofera Blue Ready (cut to size)      [] Normal Saline wet to dry  [] Betadine wet to dry    [] Hydrogel  [] Mepitel     [] Bactroban/Mupirocin   [] Iodoform Packing Strip [] Plain Packing Strip   [] Skin Sub:   [] Other:      [x]

## 2023-08-07 ENCOUNTER — HOSPITAL ENCOUNTER (OUTPATIENT)
Facility: HOSPITAL | Age: 88
Discharge: HOME | End: 2023-08-07
Payer: MEDICARE

## 2023-08-07 VITALS
RESPIRATION RATE: 18 BRPM | HEART RATE: 68 BPM | TEMPERATURE: 97.8 F | DIASTOLIC BLOOD PRESSURE: 67 MMHG | SYSTOLIC BLOOD PRESSURE: 155 MMHG

## 2023-08-07 DIAGNOSIS — S91.001D ANKLE WOUND, RIGHT, SUBSEQUENT ENCOUNTER: Primary | ICD-10-CM

## 2023-08-07 PROCEDURE — 11042 DBRDMT SUBQ TIS 1ST 20SQCM/<: CPT

## 2023-08-07 RX ORDER — LIDOCAINE HYDROCHLORIDE 20 MG/ML
JELLY TOPICAL ONCE
OUTPATIENT
Start: 2023-08-07 | End: 2023-08-07

## 2023-08-07 RX ORDER — SODIUM CHLOR/HYPOCHLOROUS ACID 0.033 %
SOLUTION, IRRIGATION IRRIGATION ONCE
OUTPATIENT
Start: 2023-08-07 | End: 2023-08-07

## 2023-08-07 NOTE — WOUND CARE
Wound Clinic Physician Orders and Discharge Instructions  [address] [place] [address] S. 709 [facility], [address] [place] [address]  Telephone: [phone]    NAME:  Mirza Myers  YOB: 1931  MEDICAL RECORD NUMBER:  [id number]  DATE:  8/7/2023      Return Appointment:  [] Dressing Supply Provider:   [x] Home Healthcare: Mckenzie Arciniega  [x] Return Appointment: 1 Week(s)  [] Nurse Visit:     [] Discharge from [facility]: [] Healed        [] Refer to Provider:         [] Consult    Follow-up Information:  [] Ordered Tests:   [] Referrals:   [] Rx:   [] Other:       Wound Cleansing:   Do not scrub or use excessive force. Cleanse wound prior to applying a clean dressing with:  [] Normal Saline   [] Keep Wound Dry in Shower     [] Wound Cleanser   [x] Cleanse wound with Mild Soap & Water    [] Other:       Topical Treatments:  Do not apply lotions, creams, or ointments to wound bed unless directed. [x] Apply moisturizing lotion to skin surrounding the wound prior to dressing change.  [] Apply antifungal ointment to skin surrounding the wound prior to dressing change.  [] Apply thin film of moisture barrier ointment to skin immediately around wound.   [] Apply Betadine to skin immediately around wound   [] Other:      Dressings:           Wound Location L Leg anterior    [x] Apply Primary Dressing:       [] MediHoney Gel [] MediHoney Alginate  [] Calcium Alginate with Silver   [] Calcium Alginate without silver   [] Collagen with silver [] Collagen without Silver    [] Santyl with moist saline gauze     [x] Hydrofera Blue (cut to size and moistened with normal saline)  [] Hydrofera Blue Ready (cut to size)      [] Normal Saline wet to dry  [] Betadine wet to dry    [] Hydrogel  [] Mepitel     [] Bactroban/Mupirocin   [] Iodoform Packing Strip [] Plain Packing Strip   [] Skin Sub:   [] Other:      [x] Cover and Secure with:     [x] Gauze [x] Belkis  [] Kerlix   [] Foam  []
HISTORY        Diagnosis Date    High cholesterol     High cholesterol     HTN (hypertension)     Hypertension     Hypothyroidism     Hypothyroidism        PAST SURGICAL HISTORY    Past Surgical History:   Procedure Laterality Date    BREAST LUMPECTOMY      HYSTERECTOMY (CERVIX STATUS UNKNOWN)         FAMILY HISTORY    Family History   Problem Relation Age of Onset    Heart Disease Mother     Cancer Father        SOCIAL HISTORY    Social History     Tobacco Use    Smoking status: Former    Smokeless tobacco: Never   Vaping Use    Vaping Use: Never used   Substance Use Topics    Alcohol use: Not Currently    Drug use: Never       ALLERGIES    No Known Allergies    MEDICATIONS    Current Outpatient Medications on File Prior to Encounter   Medication Sig Dispense Refill    ACETAMINOPHEN-CODEINE #3 300-30 MG PO TABS, STARTER PACK, Take by mouth      aspirin 81 MG chewable tablet Take 1 tablet by mouth in the morning and at bedtime      atorvastatin (LIPITOR) 20 MG tablet Take 1 tablet by mouth daily      cyanocobalamin 1000 MCG tablet Take 1 tablet by mouth daily      amLODIPine (NORVASC) 5 MG tablet Take 1 tablet by mouth daily      levothyroxine (SYNTHROID) 137 MCG tablet Synthroid 137 mcg tablet      Cholecalciferol 50 MCG (2000 UT) CAPS Vitamin D3 50 mcg (2,000 unit) capsule   Take by oral route. cilostazol (PLETAL) 100 MG tablet Take 1 tablet by mouth 2 times daily      latanoprost (XALATAN) 0.005 % ophthalmic solution latanoprost 0.005 % eye drops      metoprolol tartrate (LOPRESSOR) 25 MG tablet metoprolol tartrate 25 mg tablet      polyethylene glycol (GLYCOLAX) 17 GM/SCOOP powder Take 17 g by mouth daily      ramipril (ALTACE) 1.25 MG capsule Take 1 capsule by mouth daily      timolol (TIMOPTIC) 0.5 % ophthalmic solution Apply 1 drop to eye daily       No current facility-administered medications on file prior to encounter.        REVIEW OF SYSTEMS  A comprehensive review of systems was negative except for

## 2023-08-07 NOTE — DISCHARGE INSTRUCTIONS
Wound Clinic Physician Orders and Discharge Instructions  [address] [place] [address] S. 709 [facility], [address] [place] [address] Way  Telephone: [phone]    NAME:  Hasmukh Sosa  YOB: 1931  MEDICAL RECORD NUMBER:  [id number]  DATE:  8/7/2023      Return Appointment:  [] Dressing Supply Provider:   [x] Home Healthcare: Porsche Szymanski  [x] Return Appointment: 1 Week(s)  [] Nurse Visit:     [] Discharge from [facility]: [] Healed        [] Refer to Provider:         [] Consult    Follow-up Information:  [] Ordered Tests:   [] Referrals:   [] Rx:   [] Other:       Wound Cleansing:   Do not scrub or use excessive force. Cleanse wound prior to applying a clean dressing with:  [] Normal Saline   [] Keep Wound Dry in Shower     [] Wound Cleanser   [x] Cleanse wound with Mild Soap & Water    [] Other:       Topical Treatments:  Do not apply lotions, creams, or ointments to wound bed unless directed. [x] Apply moisturizing lotion to skin surrounding the wound prior to dressing change.  [] Apply antifungal ointment to skin surrounding the wound prior to dressing change.  [] Apply thin film of moisture barrier ointment to skin immediately around wound.   [] Apply Betadine to skin immediately around wound   [] Other:      Dressings:           Wound Location L Leg anterior    [x] Apply Primary Dressing:       [] MediHoney Gel [] MediHoney Alginate  [] Calcium Alginate with Silver   [] Calcium Alginate without silver   [] Collagen with silver [] Collagen without Silver    [] Santyl with moist saline gauze     [x] Hydrofera Blue (cut to size and moistened with normal saline)  [] Hydrofera Blue Ready (cut to size)      [] Normal Saline wet to dry  [] Betadine wet to dry    [] Hydrogel  [] Mepitel     [] Bactroban/Mupirocin   [] Iodoform Packing Strip [] Plain Packing Strip   [] Skin Sub:   [] Other:      [x] Cover and Secure with:     [x] Gauze [x] Belkis  [] Kerlix   [] Foam  [] Super

## 2023-08-21 ENCOUNTER — HOSPITAL ENCOUNTER (OUTPATIENT)
Facility: HOSPITAL | Age: 88
Discharge: HOME | End: 2023-08-21
Payer: MEDICARE

## 2023-08-21 VITALS
TEMPERATURE: 98 F | HEART RATE: 55 BPM | DIASTOLIC BLOOD PRESSURE: 55 MMHG | RESPIRATION RATE: 18 BRPM | SYSTOLIC BLOOD PRESSURE: 134 MMHG

## 2023-08-21 DIAGNOSIS — S91.001D ANKLE WOUND, RIGHT, SUBSEQUENT ENCOUNTER: Primary | ICD-10-CM

## 2023-08-21 PROCEDURE — 11042 DBRDMT SUBQ TIS 1ST 20SQCM/<: CPT

## 2023-08-21 RX ORDER — LIDOCAINE HYDROCHLORIDE 20 MG/ML
JELLY TOPICAL ONCE
OUTPATIENT
Start: 2023-08-21 | End: 2023-08-21

## 2023-08-21 RX ORDER — SODIUM CHLOR/HYPOCHLOROUS ACID 0.033 %
SOLUTION, IRRIGATION IRRIGATION ONCE
OUTPATIENT
Start: 2023-08-21 | End: 2023-08-21

## 2023-08-21 NOTE — DISCHARGE INSTRUCTIONS
Wound Clinic Physician Orders and Discharge Instructions  [address] [place] [address] S. [address]  Telephone: [phone]    NAME:  Silvana Chavez  YOB: 1931  MEDICAL RECORD NUMBER:  [id number]  DATE:  8/21/2023      Return Appointment:  [] Dressing Supply Provider:   [x] Home Healthcare: Phani De Santiago  [x] Return Appointment: 1 Week(s)  [] Nurse Visit:     [] Discharge from [facility]: [] Healed        [] Refer to Provider:         [] Consult    Follow-up Information:  [] Ordered Tests:   [] Referrals:   [] Rx:   [] Other:       Wound Cleansing:   Do not scrub or use excessive force. Cleanse wound prior to applying a clean dressing with:  [] Normal Saline   [] Keep Wound Dry in Shower     [] Wound Cleanser   [x] Cleanse wound with Mild Soap & Water    [] Other:       Topical Treatments:  Do not apply lotions, creams, or ointments to wound bed unless directed. [x] Apply moisturizing lotion to skin surrounding the wound prior to dressing change.  [] Apply antifungal ointment to skin surrounding the wound prior to dressing change.  [] Apply thin film of moisture barrier ointment to skin immediately around wound.   [] Apply Betadine to skin immediately around wound   [] Other:      Dressings:           Wound Location L Leg anterior    [x] Apply Primary Dressing:       [] MediHoney Gel [] MediHoney Alginate  [] Calcium Alginate with Silver   [] Calcium Alginate without silver   [] Collagen with silver [x] Collagen without Silver    [] Santyl with moist saline gauze     [] Hydrofera Blue (cut to size and moistened with normal saline)  [] Hydrofera Blue Ready (cut to size)      [] Normal Saline wet to dry  [] Betadine wet to dry    [] Hydrogel  [] Mepitel     [] Bactroban/Mupirocin   [] Iodoform Packing Strip [] Plain Packing Strip   [] Skin Sub:   [] Other:      [x] Cover and Secure with:     [x] Gauze [x] Belkis  [] Kerlix   [] Foam  []

## 2023-08-21 NOTE — WOUND CARE
[facility] [place]   Medical Staff Progress Note     Gerardo Cooley  MEDICAL RECORD NUMBER:  [id number]  AGE: 80 y.o. GENDER: female  : 1931  EPISODE DATE:  2023    Chief complaint and reason for visit:   LLE  Chief Complaint   Patient presents with    Wound Check     Left leg      Patient presenting for follow up evaluation of wound(s) per chief complaint. Subjective: Symptoms, wound related issues, or other pertinent wound history since last visit:   c/o pain in L shin wound, no fever, no drainage    Wound 07/10/23 Leg Left #2 (Active)   Wound Image   23 141   Wound Etiology Traumatic 23 141   Dressing Status Clean;Dry; Intact 23   Wound Cleansed Cleansed with saline 23   Dressing/Treatment Collagen;Gauze dressing/dressing sponge;Tubular bandage 23 1432   Wound Length (cm) 5 cm 23 141   Wound Width (cm) 1 cm 23 141   Wound Depth (cm) 0.1 cm 23 141   Wound Surface Area (cm^2) 5 cm^2 23 141   Change in Wound Size % (l*w) 74.03 23 141   Wound Volume (cm^3) 0.5 cm^3 23 141   Wound Healing % 74 23 1414   Post-Procedure Length (cm) 5 cm 23 1420   Post-Procedure Width (cm) 1 cm 23 142   Post-Procedure Depth (cm) 0.1 cm 23 142   Post-Procedure Surface Area (cm^2) 5 cm^2 23 1420   Post-Procedure Volume (cm^3) 0.5 cm^3 23 142   Wound Assessment Granulation tissue;Slough 23 141   Drainage Amount Moderate (25-50%) 23 141   Drainage Description Serosanguinous 23 141   Odor None 23   Anamaria-wound Assessment Intact 23 141   Margins Undefined edges 23   Wound Thickness Description not for Pressure Injury Full thickness 23 1414   Number of days: 42          Procedures done during this encounter:   debridement    TIME: E/M Time spent with patient and/or patient care issues: []

## 2023-08-21 NOTE — WOUND CARE
Wound Clinic Physician Orders and Discharge Instructions  [address] [place] [address] S. 709 [facility], [address] [place] [address] Way  Telephone: [phone]    NAME:  Mirza Myers  YOB: 1931  MEDICAL RECORD NUMBER:  [id number]  DATE:  8/21/2023      Return Appointment:  [] Dressing Supply Provider:   [x] Home Healthcare: Mckenzie Arciniega  [x] Return Appointment: 3 Week(s)  [] Nurse Visit:     [] Discharge from [facility]: [] Healed        [] Refer to Provider:         [] Consult    Follow-up Information:  [] Ordered Tests:   [] Referrals:   [] Rx:   [] Other:       Wound Cleansing:   Do not scrub or use excessive force. Cleanse wound prior to applying a clean dressing with:  [] Normal Saline   [] Keep Wound Dry in Shower     [] Wound Cleanser   [x] Cleanse wound with Mild Soap & Water    [] Other:       Topical Treatments:  Do not apply lotions, creams, or ointments to wound bed unless directed. [x] Apply moisturizing lotion to skin surrounding the wound prior to dressing change.  [] Apply antifungal ointment to skin surrounding the wound prior to dressing change.  [] Apply thin film of moisture barrier ointment to skin immediately around wound.   [] Apply Betadine to skin immediately around wound   [] Other:      Dressings:           Wound Location L Leg anterior    [x] Apply Primary Dressing:       [] MediHoney Gel [] MediHoney Alginate  [] Calcium Alginate with Silver   [] Calcium Alginate without silver   [] Collagen with silver [x] Collagen without Silver    [] Santyl with moist saline gauze     [] Hydrofera Blue (cut to size and moistened with normal saline)  [] Hydrofera Blue Ready (cut to size)      [] Normal Saline wet to dry  [] Betadine wet to dry    [] Hydrogel  [] Mepitel     [] Bactroban/Mupirocin   [] Iodoform Packing Strip [] Plain Packing Strip   [] Skin Sub:   [] Other:      [x] Cover and Secure with:     [x] Gauze [x] Belkis  [] Kerlix   [] Foam  []

## 2023-09-11 ENCOUNTER — HOSPITAL ENCOUNTER (OUTPATIENT)
Facility: HOSPITAL | Age: 88
Discharge: HOME | End: 2023-09-11
Payer: MEDICARE

## 2023-09-11 VITALS
HEART RATE: 73 BPM | DIASTOLIC BLOOD PRESSURE: 69 MMHG | TEMPERATURE: 97.8 F | SYSTOLIC BLOOD PRESSURE: 131 MMHG | RESPIRATION RATE: 18 BRPM

## 2023-09-11 DIAGNOSIS — S91.001D ANKLE WOUND, RIGHT, SUBSEQUENT ENCOUNTER: Primary | ICD-10-CM

## 2023-09-11 PROCEDURE — 99213 OFFICE O/P EST LOW 20 MIN: CPT

## 2023-09-11 RX ORDER — LIDOCAINE HYDROCHLORIDE 20 MG/ML
JELLY TOPICAL ONCE
OUTPATIENT
Start: 2023-09-11 | End: 2023-09-11

## 2023-09-11 RX ORDER — SODIUM CHLOR/HYPOCHLOROUS ACID 0.033 %
SOLUTION, IRRIGATION IRRIGATION ONCE
OUTPATIENT
Start: 2023-09-11 | End: 2023-09-11

## 2023-09-11 RX ORDER — TRIAMCINOLONE ACETONIDE 0.25 MG/G
OINTMENT TOPICAL
Qty: 15 G | Refills: 1 | Status: SHIPPED | OUTPATIENT
Start: 2023-09-11 | End: 2023-09-18

## 2023-09-11 NOTE — DISCHARGE INSTRUCTIONS
Wound Clinic Physician Orders and Discharge Instructions  [address] [place] [address] S. 709 [facility], [address] [place] [address] Way  Telephone: [phone]    NAME:  Lily Bland  YOB: 1931  MEDICAL RECORD NUMBER:  [id number]  DATE:  9/11/2023      Return Appointment:  [] Dressing Supply Provider:   [x] Home Healthcare: Frances Almeida  [x] Return Appointment: 1 Week(s)  [] Nurse Visit:     [] Discharge from [facility]: [] Healed        [] Refer to Provider:         [] Consult    Follow-up Information:  [] Ordered Tests:   [] Referrals:   [x] Rx: Triamcinolone to pharmacy  [] Other:       Wound Cleansing:   Do not scrub or use excessive force. Cleanse wound prior to applying a clean dressing with:  [] Normal Saline   [] Keep Wound Dry in Shower     [] Wound Cleanser   [x] Cleanse wound with Mild Soap & Water    [] Other:       Topical Treatments:  Do not apply lotions, creams, or ointments to wound bed unless directed. [x] Apply moisturizing lotion to skin surrounding the wound prior to dressing change.  [] Apply antifungal ointment to skin surrounding the wound prior to dressing change.  [] Apply thin film of moisture barrier ointment to skin immediately around wound.   [] Apply Betadine to skin immediately around wound   [x] Other: Triamcinolone to anthony-wound inflammation     Dressings:           Wound Location L Leg anterior    [x] Apply Primary Dressing:       [] MediHoney Gel [] MediHoney Alginate  [] Calcium Alginate with Silver   [] Calcium Alginate without silver   [] Collagen with silver [x] Collagen without Silver to small open area only  [] Santyl with moist saline gauze     [] Hydrofera Blue (cut to size and moistened with normal saline)  [] Hydrofera Blue Ready (cut to size)      [] Normal Saline wet to dry  [] Betadine wet to dry    [] Hydrogel  [] Mepitel     [] Bactroban/Mupirocin   [] Iodoform Packing Strip [] Plain Packing Strip   [] Skin Sub:   []

## 2023-09-11 NOTE — WOUND CARE
Wound Clinic Physician Orders and Discharge Instructions  [address] [place] [address] S. 709 [facility], [address] [place] [address] Way  Telephone: [phone]    NAME:  Valerie Galloway  YOB: 1931  MEDICAL RECORD NUMBER:  [id number]  DATE:  9/11/2023      Return Appointment:  [] Dressing Supply Provider:   [x] Home Healthcare: Richard Lehman  [x] Return Appointment: 1 Week(s)  [] Nurse Visit:     [] Discharge from [facility]: [] Healed        [] Refer to Provider:         [] Consult    Follow-up Information:  [] Ordered Tests:   [] Referrals:   [x] Rx: Triamcinolone to pharmacy  [] Other:       Wound Cleansing:   Do not scrub or use excessive force. Cleanse wound prior to applying a clean dressing with:  [] Normal Saline   [] Keep Wound Dry in Shower     [] Wound Cleanser   [x] Cleanse wound with Mild Soap & Water    [] Other:       Topical Treatments:  Do not apply lotions, creams, or ointments to wound bed unless directed. [x] Apply moisturizing lotion to skin surrounding the wound prior to dressing change.  [] Apply antifungal ointment to skin surrounding the wound prior to dressing change.  [] Apply thin film of moisture barrier ointment to skin immediately around wound.   [] Apply Betadine to skin immediately around wound   [x] Other: Triamcinolone to anthony-wound inflammation     Dressings:           Wound Location L Leg anterior    [x] Apply Primary Dressing:       [] MediHoney Gel [] MediHoney Alginate  [] Calcium Alginate with Silver   [] Calcium Alginate without silver   [] Collagen with silver [x] Collagen without Silver to small open area only  [] Santyl with moist saline gauze     [] Hydrofera Blue (cut to size and moistened with normal saline)  [] Hydrofera Blue Ready (cut to size)      [] Normal Saline wet to dry  [] Betadine wet to dry    [] Hydrogel  [] Mepitel     [] Bactroban/Mupirocin   [] Iodoform Packing Strip [] Plain Packing Strip   [] Skin Sub:
review of systems was negative except for what has been indicated above and: no changes    ASSESSMENT:  improved    PLAN:  Continue collagen to wound  Start Triamcinolone to periwound inflammation    Written patient dismissal instructions given to patient and signed by patient or POA. Discharge Instructions         Wound Clinic Physician Orders and Discharge Instructions  [address] [place] [address] S. [address]  Telephone: [phone]    NAME:  Del Persaud  YOB: 1931  MEDICAL RECORD NUMBER:  [id number]  DATE:  9/11/2023      Return Appointment:  [] Dressing Supply Provider:   [x] Home Healthcare: Dipak Liz  [x] Return Appointment: 1 Week(s)  [] Nurse Visit:     [] Discharge from [facility]: [] Healed        [] Refer to Provider:         [] Consult    Follow-up Information:  [] Ordered Tests:   [] Referrals:   [x] Rx: Triamcinolone to pharmacy  [] Other:       Wound Cleansing:   Do not scrub or use excessive force. Cleanse wound prior to applying a clean dressing with:  [] Normal Saline   [] Keep Wound Dry in Shower     [] Wound Cleanser   [x] Cleanse wound with Mild Soap & Water    [] Other:       Topical Treatments:  Do not apply lotions, creams, or ointments to wound bed unless directed. [x] Apply moisturizing lotion to skin surrounding the wound prior to dressing change.  [] Apply antifungal ointment to skin surrounding the wound prior to dressing change.  [] Apply thin film of moisture barrier ointment to skin immediately around wound.   [] Apply Betadine to skin immediately around wound   [x] Other: Triamcinolone to anthony-wound inflammation     Dressings:           Wound Location L Leg anterior    [x] Apply Primary Dressing:       [] MediHoney Gel [] MediHoney Alginate  [] Calcium Alginate with Silver   [] Calcium Alginate without silver   [] Collagen with silver [x] Collagen without Silver to small open area only  []

## 2023-09-25 ENCOUNTER — HOSPITAL ENCOUNTER (OUTPATIENT)
Facility: HOSPITAL | Age: 88
Discharge: HOME | End: 2023-09-25
Payer: MEDICARE

## 2023-09-25 VITALS
SYSTOLIC BLOOD PRESSURE: 141 MMHG | TEMPERATURE: 98.5 F | HEART RATE: 78 BPM | RESPIRATION RATE: 18 BRPM | DIASTOLIC BLOOD PRESSURE: 62 MMHG

## 2023-09-25 DIAGNOSIS — S91.001D ANKLE WOUND, RIGHT, SUBSEQUENT ENCOUNTER: Primary | ICD-10-CM

## 2023-09-25 PROCEDURE — 99213 OFFICE O/P EST LOW 20 MIN: CPT

## 2023-09-25 RX ORDER — TRIAMCINOLONE ACETONIDE 1 MG/G
OINTMENT TOPICAL ONCE
OUTPATIENT
Start: 2023-09-25 | End: 2023-09-25

## 2023-09-25 RX ORDER — SODIUM CHLOR/HYPOCHLOROUS ACID 0.033 %
SOLUTION, IRRIGATION IRRIGATION ONCE
OUTPATIENT
Start: 2023-09-25 | End: 2023-09-25

## 2023-09-25 RX ORDER — LIDOCAINE HYDROCHLORIDE 20 MG/ML
JELLY TOPICAL ONCE
OUTPATIENT
Start: 2023-09-25 | End: 2023-09-25

## 2023-09-25 NOTE — DISCHARGE INSTRUCTIONS
Wound Clinic Physician Orders and Discharge Instructions  [address] [place] [address] S. 709 [facility], [address] [place] [address] Way  Telephone: [phone]    NAME:  Dai Watkins  YOB: 1931  MEDICAL RECORD NUMBER:  [id number]  DATE:  9/25/2023      Return Appointment:  [] Dressing Supply Provider:   [x] Home Healthcare: Arabella Huizar  [x] Return Appointment: 2 Week(s)  [] Nurse Visit:     [] Discharge from [facility]: [] Healed        [] Refer to Provider:         [] Consult    Follow-up Information:  [] Ordered Tests:   [] Referrals:   [] Rx:   [] Other:       Wound Cleansing:   Do not scrub or use excessive force. Cleanse wound prior to applying a clean dressing with:  [] Normal Saline   [] Keep Wound Dry in Shower     [] Wound Cleanser   [x] Cleanse wound with Mild Soap & Water    [] Other:       Topical Treatments:  Do not apply lotions, creams, or ointments to wound bed unless directed. [x] Apply moisturizing lotion to skin surrounding the wound prior to dressing change.  [] Apply antifungal ointment to skin surrounding the wound prior to dressing change.  [] Apply thin film of moisture barrier ointment to skin immediately around wound.   [] Apply Betadine to skin immediately around wound   [x] Other: Triamcinolone to anthony-wound inflammation     Dressings:           Wound Location L Leg    [x] Apply Primary Dressing:       [] MediHoney Gel [] MediHoney Alginate  [] Calcium Alginate with Silver   [] Calcium Alginate without silver   [] Collagen with silver [] Collagen without Silver  [] Santyl with moist saline gauze     [] Hydrofera Blue (cut to size and moistened with normal saline)  [] Hydrofera Blue Ready (cut to size)      [] Normal Saline wet to dry  [] Betadine wet to dry    [x] Hydrogel  [] Mepitel     [] Bactroban/Mupirocin   [] Iodoform Packing Strip [] Plain Packing Strip   [] Skin Sub:   [] Other:      [x] Cover and Secure with:     [x] Gauze [x] [facility]

## 2023-09-25 NOTE — WOUND CARE
[facility]   Medical Staff Progress Note     Gerardo Cooley  MEDICAL RECORD NUMBER:  [id number]  AGE: 80 y.o. GENDER: female  : 1931  EPISODE DATE:  2023    Chief complaint and reason for visit:   LLE  Chief Complaint   Patient presents with    Wound Check     Left leg      Patient presenting for follow up evaluation of wound(s) per chief complaint. Subjective: Symptoms, wound related issues, or other pertinent wound history since last visit:   No new problems reported; some itchiness in wounded area     Wound 07/10/23 Leg Left #2 (Active)   Wound Image   23 1329   Wound Etiology Venous 23 1329   Dressing Status Clean;Dry; Intact 23 132   Wound Cleansed Cleansed with saline 23 132   Dressing/Treatment Other (comment) 23 1400   Wound Length (cm) 2 cm 23 1329   Wound Width (cm) 1.5 cm 23 1329   Wound Depth (cm) 0.1 cm 23 1329   Wound Surface Area (cm^2) 3 cm^2 23 1329   Change in Wound Size % (l*w) 84.42 23 1329   Wound Volume (cm^3) 0.3 cm^3 23 1329   Wound Healing % 84 23 1329   Post-Procedure Length (cm) 5 cm 23 1420   Post-Procedure Width (cm) 1 cm 23 1420   Post-Procedure Depth (cm) 0.1 cm 23 1420   Post-Procedure Surface Area (cm^2) 5 cm^2 23 1420   Post-Procedure Volume (cm^3) 0.5 cm^3 23 1420   Wound Assessment Granulation tissue;Slough 23 1329   Drainage Amount Moderate (25-50%) 23 132   Drainage Description Serosanguinous 23 1329   Odor None 23 132   Anamaria-wound Assessment Intact;Dry/flaky 23 1329   Margins Attached edges 23 1329   Wound Thickness Description not for Pressure Injury Full thickness 23 132   Number of days: 77          Procedures done during this encounter:   none    TIME: E/M Time spent with patient and/or patient care issues: [] 15-20 min  [] 21-30 min  []

## 2023-09-25 NOTE — WOUND CARE
Wound Clinic Physician Orders and Discharge Instructions  [address] [place] [address] S. 709 [facility], [address] [place] [address] Way  Telephone: [phone]    NAME:  Lawanda Stroud  YOB: 1931  MEDICAL RECORD NUMBER:  [id number]  DATE:  9/25/2023      Return Appointment:  [] Dressing Supply Provider:   [x] Home Healthcare: Carleen Tabares  [x] Return Appointment: 2 Week(s)  [] Nurse Visit:     [] Discharge from [facility]: [] Healed        [] Refer to Provider:         [] Consult    Follow-up Information:  [] Ordered Tests:   [] Referrals:   [] Rx:   [] Other:       Wound Cleansing:   Do not scrub or use excessive force. Cleanse wound prior to applying a clean dressing with:  [] Normal Saline   [] Keep Wound Dry in Shower     [] Wound Cleanser   [x] Cleanse wound with Mild Soap & Water    [] Other:       Topical Treatments:  Do not apply lotions, creams, or ointments to wound bed unless directed. [x] Apply moisturizing lotion to skin surrounding the wound prior to dressing change.  [] Apply antifungal ointment to skin surrounding the wound prior to dressing change.  [] Apply thin film of moisture barrier ointment to skin immediately around wound.   [] Apply Betadine to skin immediately around wound   [x] Other: Triamcinolone to anthony-wound inflammation     Dressings:           Wound Location L Leg    [x] Apply Primary Dressing:       [] MediHoney Gel [] MediHoney Alginate  [] Calcium Alginate with Silver   [] Calcium Alginate without silver   [] Collagen with silver [] Collagen without Silver  [] Santyl with moist saline gauze     [] Hydrofera Blue (cut to size and moistened with normal saline)  [] Hydrofera Blue Ready (cut to size)      [] Normal Saline wet to dry  [] Betadine wet to dry    [x] Hydrogel  [] Mepitel     [] Bactroban/Mupirocin   [] Iodoform Packing Strip [] Plain Packing Strip   [] Skin Sub:   [] Other:      [x] Cover and Secure with:     [x] Gauze [x] No

## 2023-10-09 ENCOUNTER — HOSPITAL ENCOUNTER (OUTPATIENT)
Facility: HOSPITAL | Age: 88
Discharge: HOME | End: 2023-10-09
Attending: SPECIALIST
Payer: MEDICARE

## 2023-10-09 VITALS
SYSTOLIC BLOOD PRESSURE: 143 MMHG | TEMPERATURE: 98.1 F | RESPIRATION RATE: 18 BRPM | DIASTOLIC BLOOD PRESSURE: 67 MMHG | HEART RATE: 96 BPM

## 2023-10-09 DIAGNOSIS — S91.001D ANKLE WOUND, RIGHT, SUBSEQUENT ENCOUNTER: Primary | ICD-10-CM

## 2023-10-09 PROCEDURE — 99213 OFFICE O/P EST LOW 20 MIN: CPT

## 2023-10-09 RX ORDER — TRIAMCINOLONE ACETONIDE 1 MG/G
OINTMENT TOPICAL ONCE
OUTPATIENT
Start: 2023-10-09 | End: 2023-10-09

## 2023-10-09 RX ORDER — SODIUM CHLOR/HYPOCHLOROUS ACID 0.033 %
SOLUTION, IRRIGATION IRRIGATION ONCE
OUTPATIENT
Start: 2023-10-09 | End: 2023-10-09

## 2023-10-09 RX ORDER — LIDOCAINE HYDROCHLORIDE 20 MG/ML
JELLY TOPICAL ONCE
OUTPATIENT
Start: 2023-10-09 | End: 2023-10-09

## 2023-10-09 NOTE — DISCHARGE INSTRUCTIONS
Wound Clinic Physician Orders and Discharge Instructions  [address] [place] [address] S. 709 [facility], [address] [place] [address] Way  Telephone: [phone]    NAME:  Angel Gonzalez  YOB: 1931  MEDICAL RECORD NUMBER:  [id number]  DATE:  10/9/2023      Return Appointment:  [] Dressing Supply Provider:   [x] Home Healthcare: Gloria Singh  [x] Return Appointment: 1 Week(s)  [] Nurse Visit:     [] Discharge from [facility]: [] Healed        [] Refer to Provider:         [] Consult    Follow-up Information:  [] Ordered Tests:   [] Referrals:   [] Rx:   [] Other:       Wound Cleansing:   Do not scrub or use excessive force. Cleanse wound prior to applying a clean dressing with:  [] Normal Saline   [] Keep Wound Dry in Shower     [] Wound Cleanser   [x] Cleanse wound with Mild Soap & Water    [] Other:       Topical Treatments:  Do not apply lotions, creams, or ointments to wound bed unless directed. [x] Apply moisturizing lotion to skin surrounding the wound prior to dressing change.  [] Apply antifungal ointment to skin surrounding the wound prior to dressing change.  [] Apply thin film of moisture barrier ointment to skin immediately around wound.   [] Apply Betadine to skin immediately around wound   [] Other:      Dressings:           Wound Location L Leg    [x] Apply Primary Dressing:       [] MediHoney Gel [] MediHoney Alginate  [] Calcium Alginate with Silver   [] Calcium Alginate without silver   [x] Collagen with silverto 2 small open areas [] Collagen without Silver  [] Santyl with moist saline gauze     [] Hydrofera Blue (cut to size and moistened with normal saline)  [] Hydrofera Blue Ready (cut to size)      [] Normal Saline wet to dry  [] Betadine wet to dry    [] Hydrogel  [] Mepitel     [] Bactroban/Mupirocin   [] Iodoform Packing Strip [] Plain Packing Strip   [] Skin Sub:   [] Other:      [x] Cover and Secure with:     [x] Gauze [x] Belkis  []

## 2023-10-16 ENCOUNTER — HOSPITAL ENCOUNTER (OUTPATIENT)
Facility: HOSPITAL | Age: 88
Discharge: HOME | End: 2023-10-16
Attending: SPECIALIST
Payer: MEDICARE

## 2023-10-16 VITALS
DIASTOLIC BLOOD PRESSURE: 58 MMHG | HEART RATE: 70 BPM | SYSTOLIC BLOOD PRESSURE: 139 MMHG | TEMPERATURE: 98.6 F | RESPIRATION RATE: 18 BRPM

## 2023-10-16 DIAGNOSIS — S91.001D ANKLE WOUND, RIGHT, SUBSEQUENT ENCOUNTER: Primary | ICD-10-CM

## 2023-10-16 PROCEDURE — 99213 OFFICE O/P EST LOW 20 MIN: CPT

## 2023-10-16 RX ORDER — TRIAMCINOLONE ACETONIDE 1 MG/G
OINTMENT TOPICAL ONCE
OUTPATIENT
Start: 2023-10-16 | End: 2023-10-16

## 2023-10-16 RX ORDER — LIDOCAINE HYDROCHLORIDE 20 MG/ML
JELLY TOPICAL ONCE
OUTPATIENT
Start: 2023-10-16 | End: 2023-10-16

## 2023-10-16 RX ORDER — SODIUM CHLOR/HYPOCHLOROUS ACID 0.033 %
SOLUTION, IRRIGATION IRRIGATION ONCE
OUTPATIENT
Start: 2023-10-16 | End: 2023-10-16

## 2023-10-16 ASSESSMENT — PAIN SCALES - GENERAL: PAINLEVEL_OUTOF10: 3

## 2023-10-16 NOTE — WOUND CARE
Wound Clinic Physician Orders and Discharge Instructions  [address] [place], [place] [address] S. 709 [facility], [address] [place] [address] Way  Telephone: [phone]    NAME:  Mora Cotto  YOB: 1931  MEDICAL RECORD NUMBER:  [id number]  DATE:  10/16/2023      Return Appointment:  [] Dressing Supply Provider:   [x] Home Healthcare: Yasemin Starr  [x] Return Appointment: 1 Week(s)  [] Nurse Visit:     [] Discharge from [facility]: [] Healed        [] Refer to Provider:         [] Consult    Follow-up Information:  [] Ordered Tests:   [] Referrals:   [] Rx:   [] Other:       Wound Cleansing:   Do not scrub or use excessive force. Cleanse wound prior to applying a clean dressing with:  [] Normal Saline   [] Keep Wound Dry in Shower     [] Wound Cleanser   [x] Cleanse wound with Mild Soap & Water    [] Other:       Topical Treatments:  Do not apply lotions, creams, or ointments to wound bed unless directed. [x] Apply moisturizing lotion to skin surrounding the wound prior to dressing change.  [] Apply antifungal ointment to skin surrounding the wound prior to dressing change.  [] Apply thin film of moisture barrier ointment to skin immediately around wound.   [] Apply Betadine to skin immediately around wound   [] Other:      Dressings:           Wound Location L Leg    [x] Apply Primary Dressing:       [] MediHoney Gel [] MediHoney Alginate  [] Calcium Alginate with Silver   [] Calcium Alginate without silver   [x] Collagen with silver to small open areas [] Collagen without Silver  [] Santyl with moist saline gauze     [] Hydrofera Blue (cut to size and moistened with normal saline)  [] Hydrofera Blue Ready (cut to size)      [] Normal Saline wet to dry  [] Betadine wet to dry    [] Hydrogel  [] Mepitel     [] Bactroban/Mupirocin   [] Iodoform Packing Strip [] Plain Packing Strip   [] Skin Sub:   [] Other:      [x] Cover and Secure with:     [x] Gauze [x] Belkis  []
(cut to size)      [] Normal Saline wet to dry  [] Betadine wet to dry    [] Hydrogel  [] Mepitel     [] Bactroban/Mupirocin   [] Iodoform Packing Strip [] Plain Packing Strip   [] Skin Sub:   [] Other:      [x] Cover and Secure with:     [x] Gauze [x] Belkis  [] Kerlix   [] Foam  [] Super Absorbant [] ABD     [] Ace Wrap [] Other:    Limit contact of tape with skin. [x] Change dressing: [] Daily    [] Every Other Day   [] Twice per week   [x] Three times per week   [] Once a week [] Do Not Change Dressing   [] Other:   Edema Control:  Apply: [x] Compression Stocking:  mmHg  [x]Right Leg []Left Leg   [x] Tubigrip []Right Leg Double Layer []Left Leg Double Layer      []Right Leg Single Layer [x]Left Leg Single Layer      [x] Elevate leg(s) above the level of the heart when sitting. [x] Avoid prolonged standing in one place. Dietary:  [x] Diet as tolerated: [] Calorie Diabetic Diet: [] No Added Salt:  [x] Increase Protein: [] Other:     Activity:  [x] Activity as tolerated:    [] Patient has no activity restrictions      [] Strict Bedrest   [] Remain off Work      [] May return to full duty work                                     [] Return to work with restrictions     Physician:  [x] Dr. Pam Mai  [] Dr. Isabella Townsend  [] Dr. Roger Gomez      Nurse Case Manger:  [address] [place] [address] Information: Should you experience any significant changes in your wound(s) or have questions about your wound care, please contact the [address] [place] [address] at [phone]. Our hours are Monday - Friday 8am - 4:30pm, closed all major holidays. If you need help with your wound outside these hours and cannot wait until we are again available, contact your PCP or go to the hospital emergency room. PLEASE NOTE: IF YOU ARE UNABLE TO OBTAIN WOUND SUPPLIES, CONTINUE TO USE THE SUPPLIES YOU HAVE AVAILABLE UNTIL YOU ARE ABLE TO REACH US.  IT IS MOST IMPORTANT TO KEEP THE WOUND COVERED AT ALL

## 2023-10-16 NOTE — DISCHARGE INSTRUCTIONS
Wound Clinic Physician Orders and Discharge Instructions  [address] [place] [address] S. 709 [facility], [address] [place] [address] Way  Telephone: [phone]    NAME:  Kizzy Vargas  YOB: 1931  MEDICAL RECORD NUMBER:  [id number]  DATE:  10/16/2023      Return Appointment:  [] Dressing Supply Provider:   [x] Home Healthcare: Darius Mesa  [x] Return Appointment: 1 Week(s)  [] Nurse Visit:     [] Discharge from [facility]: [] Healed        [] Refer to Provider:         [] Consult    Follow-up Information:  [] Ordered Tests:   [] Referrals:   [] Rx:   [] Other:       Wound Cleansing:   Do not scrub or use excessive force. Cleanse wound prior to applying a clean dressing with:  [] Normal Saline   [] Keep Wound Dry in Shower     [] Wound Cleanser   [x] Cleanse wound with Mild Soap & Water    [] Other:       Topical Treatments:  Do not apply lotions, creams, or ointments to wound bed unless directed. [x] Apply moisturizing lotion to skin surrounding the wound prior to dressing change.  [] Apply antifungal ointment to skin surrounding the wound prior to dressing change.  [] Apply thin film of moisture barrier ointment to skin immediately around wound.   [] Apply Betadine to skin immediately around wound   [] Other:      Dressings:           Wound Location L Leg    [x] Apply Primary Dressing:       [] MediHoney Gel [] MediHoney Alginate  [] Calcium Alginate with Silver   [] Calcium Alginate without silver   [x] Collagen with silver to small open areas [] Collagen without Silver  [] Santyl with moist saline gauze     [] Hydrofera Blue (cut to size and moistened with normal saline)  [] Hydrofera Blue Ready (cut to size)      [] Normal Saline wet to dry  [] Betadine wet to dry    [] Hydrogel  [] Mepitel     [] Bactroban/Mupirocin   [] Iodoform Packing Strip [] Plain Packing Strip   [] Skin Sub:   [] Other:      [x] Cover and Secure with:     [x] Gauze [x] Belkis  []

## 2023-10-23 ENCOUNTER — HOSPITAL ENCOUNTER (OUTPATIENT)
Facility: HOSPITAL | Age: 88
Discharge: HOME | End: 2023-10-23
Attending: SPECIALIST
Payer: MEDICARE

## 2023-10-23 VITALS
HEART RATE: 70 BPM | SYSTOLIC BLOOD PRESSURE: 121 MMHG | RESPIRATION RATE: 18 BRPM | TEMPERATURE: 97.5 F | DIASTOLIC BLOOD PRESSURE: 65 MMHG

## 2023-10-23 DIAGNOSIS — S91.001D ANKLE WOUND, RIGHT, SUBSEQUENT ENCOUNTER: Primary | ICD-10-CM

## 2023-10-23 PROCEDURE — 99213 OFFICE O/P EST LOW 20 MIN: CPT

## 2023-10-23 RX ORDER — SODIUM CHLOR/HYPOCHLOROUS ACID 0.033 %
SOLUTION, IRRIGATION IRRIGATION ONCE
OUTPATIENT
Start: 2023-10-23 | End: 2023-10-23

## 2023-10-23 RX ORDER — TRIAMCINOLONE ACETONIDE 1 MG/G
OINTMENT TOPICAL ONCE
OUTPATIENT
Start: 2023-10-23 | End: 2023-10-23

## 2023-10-23 RX ORDER — LIDOCAINE HYDROCHLORIDE 20 MG/ML
JELLY TOPICAL ONCE
OUTPATIENT
Start: 2023-10-23 | End: 2023-10-23

## 2023-10-23 NOTE — DISCHARGE INSTRUCTIONS
Gauze [x] Belkis  [] Kerlix   [] Foam  [] Super Absorbant [] ABD     [] Ace Wrap [] Other:    Limit contact of tape with skin. [x] Change dressing: [] Daily    [] Every Other Day   [] Twice per week   [x] Three times per week   [] Once a week [] Do Not Change Dressing   [] Other:   Edema Control:  Apply: [x] Compression Stocking:  mmHg  [x]Right Leg []Left Leg   [x] Tubigrip []Right Leg Double Layer []Left Leg Double Layer      []Right Leg Single Layer [x]Left Leg Single Layer      [x] Elevate leg(s) above the level of the heart when sitting. [x] Avoid prolonged standing in one place. Dietary:  [x] Diet as tolerated: [] Calorie Diabetic Diet: [] No Added Salt:  [x] Increase Protein: [] Other:     Activity:  [x] Activity as tolerated:    [] Patient has no activity restrictions      [] Strict Bedrest   [] Remain off Work      [] May return to full duty work                                     [] Return to work with restrictions     Physician:  [x] Dr. Guadalupe Narayanan  [] Dr. Ravinder Talley  [] Dr. Mitul Lemus      Nurse Case Manger:  [facility] Information: Should you experience any significant changes in your wound(s) or have questions about your wound care, please contact the  [facility] at [phone]. Our hours are Monday - Friday 8am - 4:30pm, closed all major holidays. If you need help with your wound outside these hours and cannot wait until we are again available, contact your PCP or go to the hospital emergency room. PLEASE NOTE: IF YOU ARE UNABLE TO OBTAIN WOUND SUPPLIES, CONTINUE TO USE THE SUPPLIES YOU HAVE AVAILABLE UNTIL YOU ARE ABLE TO REACH US. IT IS MOST IMPORTANT TO KEEP THE WOUND COVERED AT ALL TIMES.

## 2023-10-23 NOTE — WOUND CARE
Non Woven Gauze  [x] Belkis  [] Kerlix   [] Foam  [] Super Absorbant [] ABD     [] Ace Wrap [] Other:    Limit contact of tape with skin. [x] Change dressing: [] Daily    [] Every Other Day   [] Twice per week   [x] Three times per week   [] Once a week [] Do Not Change Dressing   [] Other:   Edema Control:  Apply: [x] Compression Stocking:  mmHg  [x]Right Leg []Left Leg   [x] Tubigrip []Right Leg Double Layer []Left Leg Double Layer      []Right Leg Single Layer [x]Left Leg Single Layer      [x] Elevate leg(s) above the level of the heart when sitting. [x] Avoid prolonged standing in one place. Dietary:  [x] Diet as tolerated: [] Calorie Diabetic Diet: [] No Added Salt:  [x] Increase Protein: [] Other:     Activity:  [x] Activity as tolerated:    [] Patient has no activity restrictions      [] Strict Bedrest   [] Remain off Work      [] May return to full duty work                                     [] Return to work with restrictions     Physician:  [x] Dr. Owens Gave  [] Dr. Salvador Nguyen  [] Dr. Rocio Pierce      Nurse Case Manger:  [facility] Information: Should you experience any significant changes in your wound(s) or have questions about your wound care, please contact the  [employer] at [phone]. Our hours are Monday - Friday 8am - 4:30pm, closed all major holidays. If you need help with your wound outside these hours and cannot wait until we are again available, contact your PCP or go to the hospital emergency room. PLEASE NOTE: IF YOU ARE UNABLE TO OBTAIN WOUND SUPPLIES, CONTINUE TO USE THE SUPPLIES YOU HAVE AVAILABLE UNTIL YOU ARE ABLE TO REACH US. IT IS MOST IMPORTANT TO KEEP THE WOUND COVERED AT ALL TIMES.
normal bilaterally. Normal appearance  Respiratory: no chest wall tenderness. no respiratory distress  GI: Abdomen non-tender and benign  Musculoskeletal: Baseline range of motion in joints. Nontender calves. No cyanosis. Edema:  Neurologic: Speech normal. At baseline without new focal deficits. Mental status normal or at baseline  Wound: The original wound on the lateral left lower extremity is small and superficial.  There are new wounds on the anterior left lower extremity as well as new wounds laterally and medially. PAST MEDICAL HISTORY        Diagnosis Date    High cholesterol     High cholesterol     HTN (hypertension)     Hypertension     Hypothyroidism     Hypothyroidism        PAST SURGICAL HISTORY    Past Surgical History:   Procedure Laterality Date    BREAST LUMPECTOMY      HYSTERECTOMY (CERVIX STATUS UNKNOWN)         FAMILY HISTORY    Family History   Problem Relation Age of Onset    Heart Disease Mother     Cancer Father        SOCIAL HISTORY    Social History     Tobacco Use    Smoking status: Former    Smokeless tobacco: Never   Vaping Use    Vaping Use: Never used   Substance Use Topics    Alcohol use: Not Currently    Drug use: Never       ALLERGIES    No Known Allergies    MEDICATIONS    Current Outpatient Medications on File Prior to Encounter   Medication Sig Dispense Refill    ACETAMINOPHEN-CODEINE #3 300-30 MG PO TABS, STARTER PACK, Take by mouth      aspirin 81 MG chewable tablet Take 1 tablet by mouth in the morning and at bedtime      atorvastatin (LIPITOR) 20 MG tablet Take 1 tablet by mouth daily      cyanocobalamin 1000 MCG tablet Take 1 tablet by mouth daily      amLODIPine (NORVASC) 5 MG tablet Take 1 tablet by mouth daily      levothyroxine (SYNTHROID) 137 MCG tablet Synthroid 137 mcg tablet      Cholecalciferol 50 MCG (2000 UT) CAPS Vitamin D3 50 mcg (2,000 unit) capsule   Take by oral route.       cilostazol (PLETAL) 100 MG tablet Take 1 tablet by mouth 2 times daily

## 2023-10-30 ENCOUNTER — HOSPITAL ENCOUNTER (OUTPATIENT)
Facility: HOSPITAL | Age: 88
Discharge: HOME | End: 2023-10-30
Attending: SPECIALIST
Payer: MEDICARE

## 2023-10-30 VITALS
SYSTOLIC BLOOD PRESSURE: 95 MMHG | RESPIRATION RATE: 18 BRPM | TEMPERATURE: 98 F | DIASTOLIC BLOOD PRESSURE: 59 MMHG | HEART RATE: 69 BPM

## 2023-10-30 DIAGNOSIS — S91.001D ANKLE WOUND, RIGHT, SUBSEQUENT ENCOUNTER: Primary | ICD-10-CM

## 2023-10-30 PROCEDURE — 99213 OFFICE O/P EST LOW 20 MIN: CPT

## 2023-10-30 RX ORDER — SODIUM CHLOR/HYPOCHLOROUS ACID 0.033 %
SOLUTION, IRRIGATION IRRIGATION ONCE
OUTPATIENT
Start: 2023-10-30 | End: 2023-10-30

## 2023-10-30 RX ORDER — LIDOCAINE HYDROCHLORIDE 20 MG/ML
JELLY TOPICAL ONCE
OUTPATIENT
Start: 2023-10-30 | End: 2023-10-30

## 2023-10-30 RX ORDER — TRIAMCINOLONE ACETONIDE 1 MG/G
OINTMENT TOPICAL ONCE
OUTPATIENT
Start: 2023-10-30 | End: 2023-10-30

## 2023-10-30 NOTE — WOUND CARE
[facility] [place]   Medical Staff Progress Note     Rose Mary Walter  MEDICAL RECORD NUMBER:  [id number]  AGE: 80 y.o. GENDER: female  : 1931  EPISODE DATE:  10/30/2023    Chief complaint and reason for visit:   LLE  Chief Complaint   Patient presents with    Wound Check     L leg      Patient presenting for follow up evaluation of wound(s) per chief complaint. Subjective: Symptoms, wound related issues, or other pertinent wound history since last visit: no new problems reported regarding the left leg wounds     Wound 07/10/23 Leg Left #2 (Active)   Wound Image   10/30/23 1418   Wound Etiology Venous 10/30/23 1418   Dressing Status Clean;Dry; Intact 10/30/23 1418   Wound Cleansed Cleansed with saline 10/30/23 1418   Dressing/Treatment Collagen with Ag;Gauze dressing/dressing sponge;Dry dressing;Tubular bandage 10/16/23 143   Wound Length (cm) 10.5 cm 10/30/23 1418   Wound Width (cm) 11 cm 10/30/23 1418   Wound Depth (cm) 0.1 cm 10/30/23 1418   Wound Surface Area (cm^2) 115.5 cm^2 10/30/23 1418   Change in Wound Size % (l*w) -500 10/30/23 1418   Wound Volume (cm^3) 11.55 cm^3 10/30/23 1418   Wound Healing % -500 10/30/23 1418   Post-Procedure Length (cm) 5 cm 23 142   Post-Procedure Width (cm) 1 cm 23   Post-Procedure Depth (cm) 0.1 cm 23   Post-Procedure Surface Area (cm^2) 5 cm^2 23   Post-Procedure Volume (cm^3) 0.5 cm^3 23   Wound Assessment Granulation tissue;Slough 10/30/23 1418   Drainage Amount Moderate (25-50%) 10/30/23 1418   Drainage Description Serosanguinous 10/30/23 1418   Odor None 10/30/23 1418   Anamaria-wound Assessment Fragile; Excoriated 10/30/23 1418   Margins Attached edges 10/30/23 1418   Wound Thickness Description not for Pressure Injury Full thickness 10/30/23 1418   Number of days: 112          Procedures done during this encounter:   none    TIME: E/M Time spent with

## 2023-10-30 NOTE — WOUND CARE
Wound Clinic Physician Orders and Discharge Instructions  [address] [place] [address] S. 709 [facility], [address] [place] [address] Way  Telephone: [phone]    NAME:  Valerie Galloway  YOB: 1931  MEDICAL RECORD NUMBER:  [id number]  DATE:  10/30/2023      Return Appointment:  [] Dressing Supply Provider:   [x] Home Healthcare: Richard Lehman  [x] Return Appointment: 1 Week(s)  [] Nurse Visit:     [] Discharge from [facility]: [] Healed        [] Refer to Provider:         [] Consult    Follow-up Information:  [] Ordered Tests:   [] Referrals:   [] Rx:   [] Other:       Wound Cleansing:   Do not scrub or use excessive force. Cleanse wound prior to applying a clean dressing with:  [] Normal Saline   [] Keep Wound Dry in Shower     [] Wound Cleanser   [x] Cleanse wound with Mild Soap & Water    [] Other:       Topical Treatments:  Do not apply lotions, creams, or ointments to wound bed unless directed. [x] Apply moisturizing lotion to skin surrounding the wound prior to dressing change.  [] Apply antifungal ointment to skin surrounding the wound prior to dressing change.  [] Apply thin film of moisture barrier ointment to skin immediately around wound.   [] Apply Betadine to skin immediately around wound   [] Other:      Dressings:           Wound Location L Leg    [x] Apply Primary Dressing:       [] MediHoney Gel [] MediHoney Alginate  [x] Calcium Alginate with Silver: Aquacel Ag to 3 open areas   [] Calcium Alginate without silver   [] Collagen with silver to small open areas [] Collagen without Silver  [] Santyl with moist saline gauze     [] Hydrofera Blue (cut to size and moistened with normal saline)  [] Hydrofera Blue Ready (cut to size)      [] Normal Saline wet to dry  [] Betadine wet to dry    [] Hydrogel  [] Mepitel     [] Bactroban/Mupirocin   [] Iodoform Packing Strip [] Plain Packing Strip   [] Skin Sub:   [] Other:      [x] Cover and Secure with:     [x]

## 2023-10-30 NOTE — DISCHARGE INSTRUCTIONS
Non Woven Gauze  [x] Belkis  [] Kerlix   [] Foam  [] Super Absorbant [] ABD     [] Ace Wrap [] Other:    Limit contact of tape with skin. [x] Change dressing: [] Daily    [] Every Other Day   [] Twice per week   [x] Three times per week   [] Once a week [] Do Not Change Dressing   [] Other:   Edema Control:  Apply: [x] Compression Stocking:  mmHg  [x]Right Leg []Left Leg   [x] Tubigrip []Right Leg Double Layer []Left Leg Double Layer      []Right Leg Single Layer [x]Left Leg Single Layer      [x] Elevate leg(s) above the level of the heart when sitting. [x] Avoid prolonged standing in one place. Dietary:  [x] Diet as tolerated: [] Calorie Diabetic Diet: [] No Added Salt:  [x] Increase Protein: [] Other:     Activity:  [x] Activity as tolerated:    [] Patient has no activity restrictions      [] Strict Bedrest   [] Remain off Work      [] May return to full duty work                                     [] Return to work with restrictions     Physician:  [x] Dr. Esthela Ramos  [] Dr. Carlos Cortez  [] Dr. Jeramy Jackson      Nurse Case Manger:  [facility] Information: Should you experience any significant changes in your wound(s) or have questions about your wound care, please contact the  [employer] at [phone]. Our hours are Monday - Friday 8am - 4:30pm, closed all major holidays. If you need help with your wound outside these hours and cannot wait until we are again available, contact your PCP or go to the hospital emergency room. PLEASE NOTE: IF YOU ARE UNABLE TO OBTAIN WOUND SUPPLIES, CONTINUE TO USE THE SUPPLIES YOU HAVE AVAILABLE UNTIL YOU ARE ABLE TO REACH US. IT IS MOST IMPORTANT TO KEEP THE WOUND COVERED AT ALL TIMES.

## 2023-11-06 ENCOUNTER — HOSPITAL ENCOUNTER (OUTPATIENT)
Facility: HOSPITAL | Age: 88
Discharge: HOME | End: 2023-11-06
Attending: SPECIALIST
Payer: MEDICARE

## 2023-11-06 VITALS
RESPIRATION RATE: 18 BRPM | HEART RATE: 70 BPM | SYSTOLIC BLOOD PRESSURE: 138 MMHG | TEMPERATURE: 98.3 F | DIASTOLIC BLOOD PRESSURE: 68 MMHG

## 2023-11-06 DIAGNOSIS — S91.001D ANKLE WOUND, RIGHT, SUBSEQUENT ENCOUNTER: Primary | ICD-10-CM

## 2023-11-06 PROCEDURE — 99212 OFFICE O/P EST SF 10 MIN: CPT

## 2023-11-06 RX ORDER — SODIUM CHLOR/HYPOCHLOROUS ACID 0.033 %
SOLUTION, IRRIGATION IRRIGATION ONCE
Status: CANCELLED | OUTPATIENT
Start: 2023-11-06 | End: 2023-11-06

## 2023-11-06 RX ORDER — LIDOCAINE HYDROCHLORIDE 20 MG/ML
JELLY TOPICAL ONCE
Status: CANCELLED | OUTPATIENT
Start: 2023-11-06 | End: 2023-11-06

## 2023-11-06 RX ORDER — GEMFIBROZIL 600 MG/1
TABLET, FILM COATED ORAL
COMMUNITY

## 2023-11-06 RX ORDER — TRIAMCINOLONE ACETONIDE 1 MG/G
OINTMENT TOPICAL ONCE
Status: CANCELLED | OUTPATIENT
Start: 2023-11-06 | End: 2023-11-06

## 2023-11-06 NOTE — WOUND CARE
[address] [place] [facility] [place]   Medical Staff Progress Note     Danica Chopra  MEDICAL RECORD NUMBER:  [id number]  AGE: 80 y.o. GENDER: female  : 1931  EPISODE DATE:  2023    Chief complaint and reason for visit:   Left leg wounds  Chief Complaint   Patient presents with    Wound Check     Left leg      Patient presenting for follow up evaluation of wound(s) per chief complaint. Subjective: Symptoms, wound related issues, or other pertinent wound history since last visit: No new problems reported. Complains of some discomfort in her left calf area where there is no wound. Wound 07/10/23 Leg Left #2 (Active)   Wound Image   23 141   Wound Etiology Venous 23 141   Dressing Status Clean;Dry; Intact 23 141   Wound Cleansed Cleansed with saline 23 141   Dressing/Treatment Collagen with Ag;Gauze dressing/dressing sponge;Dry dressing;Tubular bandage 10/16/23 143   Wound Length (cm) 0 cm 23 1410   Wound Width (cm) 0 cm 23 1410   Wound Depth (cm) 0 cm 23 1410   Wound Surface Area (cm^2) 0 cm^2 23 141   Change in Wound Size % (l*w) 100 23 1410   Wound Volume (cm^3) 0 cm^3 23 1410   Wound Healing % 100 23 1410   Post-Procedure Length (cm) 5 cm 23 1420   Post-Procedure Width (cm) 1 cm 23 1420   Post-Procedure Depth (cm) 0.1 cm 23 1420   Post-Procedure Surface Area (cm^2) 5 cm^2 23 1420   Post-Procedure Volume (cm^3) 0.5 cm^3 23 1420   Wound Assessment Epithelialization 23 1410   Drainage Amount None (dry) 23 1410   Drainage Description Serosanguinous 10/30/23 1418   Odor None 23 141   Anamaria-wound Assessment Fragile 23 141   Margins Attached edges 23 141   Wound Thickness Description not for Pressure Injury Full thickness 23 1410   Number of days: 119          Procedures done during this encounter:   None    TIME:

## 2023-11-06 NOTE — DISCHARGE INSTRUCTIONS
Wound Clinic Physician Orders and Discharge Instructions  [address] [place], [place] [address] S. 709 [facility], [address] [place] [address] Way  Telephone: [phone]    NAME:  Mayra Kelly  YOB: 1931  MEDICAL RECORD NUMBER:  [id number]  DATE:  11/6/2023      Return Appointment:  [] Dressing Supply Provider:   [x] Home Healthcare: Funmilayo [facility] discontinue wound care  [] Return Appointment:  [facility])  [] Nurse Visit:     [x] Discharge from [facility]: [] Healed        [] Refer to Provider:         [] Consult    Follow-up Information:  [] Ordered Tests:   [] Referrals:   [] Rx:   [] Other:       Wound Cleansing:   Do not scrub or use excessive force. Cleanse wound prior to applying a clean dressing with:  [] Normal Saline   [] Keep Wound Dry in Shower     [] Wound Cleanser   [x] Cleanse wound with Mild Soap & Water    [] Other:       Topical Treatments:  Do not apply lotions, creams, or ointments to wound bed unless directed. [x] Apply moisturizing lotion to skin surrounding the wound prior to dressing change.  [] Apply antifungal ointment to skin surrounding the wound prior to dressing change.  [] Apply thin film of moisture barrier ointment to skin immediately around wound.   [] Apply Betadine to skin immediately around wound   [] Other:      Dressings:           Wound Location L Leg  HEALED  [] Apply Primary Dressing:       [] MediHoney Gel [] MediHoney Alginate  [] Calcium Alginate with Silver: Aquacel Ag to 3 open areas   [] Calcium Alginate without silver   [] Collagen with silver to small open areas [] Collagen without Silver  [] Santyl with moist saline gauze     [] Hydrofera Blue (cut to size and moistened with normal saline)  [] Hydrofera Blue Ready (cut to size)      [] Normal Saline wet to dry  [] Betadine wet to dry    [] Hydrogel  [] Mepitel     [] Bactroban/Mupirocin   [] Iodoform Packing Strip [] Plain Packing Strip   [] Skin Sub:   [] Other:      [] Cover and

## 2023-11-06 NOTE — WOUND CARE
Wound Clinic Physician Orders and Discharge Instructions  [address] [place] [address] S. 7[address] [facility], [address]  Telephone: [phone]    NAME:  Marvin Vega  YOB: 1931  MEDICAL RECORD NUMBER:  [id number]  DATE:  11/6/2023      Return Appointment:  [] Dressing Supply Provider:   [x] Home Healthcare: [address] discontinue wound care  [] Return Appointment:  [facility])  [] Nurse Visit:     [x] Discharge from [facility] [x] Healed        [] Refer to Provider:         [] Consult    Follow-up Information:  [] Ordered Tests:   [] Referrals:   [] Rx:   [] Other:       Wound Cleansing:   Do not scrub or use excessive force. Cleanse wound prior to applying a clean dressing with:  [] Normal Saline   [] Keep Wound Dry in Shower     [] Wound Cleanser   [x] Cleanse wound with Mild Soap & Water    [] Other:       Topical Treatments:  Do not apply lotions, creams, or ointments to wound bed unless directed. [x] Apply moisturizing lotion to skin surrounding the wound prior to dressing change.  [] Apply antifungal ointment to skin surrounding the wound prior to dressing change.  [] Apply thin film of moisture barrier ointment to skin immediately around wound.   [] Apply Betadine to skin immediately around wound   [] Other:      Dressings:           Wound Location L Leg  HEALED  [] Apply Primary Dressing:       [] MediHoney Gel [] MediHoney Alginate  [] Calcium Alginate with Silver: Aquacel Ag to 3 open areas   [] Calcium Alginate without silver   [] Collagen with silver to small open areas [] Collagen without Silver  [] Santyl with moist saline gauze     [] Hydrofera Blue (cut to size and moistened with normal saline)  [] Hydrofera Blue Ready (cut to size)      [] Normal Saline wet to dry  [] Betadine wet to dry    [] Hydrogel  [] Mepitel     [] Bactroban/Mupirocin   [] Iodoform Packing Strip [] Plain Packing Strip   [] Skin Sub:   [] Other:      [] Cover

## 2023-11-20 ENCOUNTER — HOSPITAL ENCOUNTER (OUTPATIENT)
Facility: HOSPITAL | Age: 88
Discharge: HOME | End: 2023-11-20
Attending: SPECIALIST
Payer: MEDICARE

## 2023-11-20 VITALS
HEART RATE: 79 BPM | SYSTOLIC BLOOD PRESSURE: 153 MMHG | RESPIRATION RATE: 18 BRPM | DIASTOLIC BLOOD PRESSURE: 68 MMHG | TEMPERATURE: 97.6 F

## 2023-11-20 DIAGNOSIS — L97.909 VENOUS ULCER OF LOWER LEG WITHOUT VARICOSE VEINS (HCC): Primary | ICD-10-CM

## 2023-11-20 DIAGNOSIS — I87.2 VENOUS ULCER OF LOWER LEG WITHOUT VARICOSE VEINS (HCC): Primary | ICD-10-CM

## 2023-11-20 PROCEDURE — 99213 OFFICE O/P EST LOW 20 MIN: CPT

## 2023-11-20 RX ORDER — SODIUM CHLOR/HYPOCHLOROUS ACID 0.033 %
SOLUTION, IRRIGATION IRRIGATION ONCE
Status: CANCELLED | OUTPATIENT
Start: 2023-11-20 | End: 2023-11-20

## 2023-11-20 RX ORDER — LIDOCAINE 40 MG/G
CREAM TOPICAL ONCE
OUTPATIENT
Start: 2023-11-20 | End: 2023-11-20

## 2023-11-20 RX ORDER — LIDOCAINE 50 MG/G
OINTMENT TOPICAL ONCE
Status: CANCELLED | OUTPATIENT
Start: 2023-11-20 | End: 2023-11-20

## 2023-11-20 RX ORDER — GINSENG 100 MG
CAPSULE ORAL ONCE
OUTPATIENT
Start: 2023-11-20 | End: 2023-11-20

## 2023-11-20 RX ORDER — LIDOCAINE HYDROCHLORIDE 20 MG/ML
JELLY TOPICAL ONCE
OUTPATIENT
Start: 2023-11-20 | End: 2023-11-20

## 2023-11-20 RX ORDER — IBUPROFEN 200 MG
TABLET ORAL ONCE
Status: CANCELLED | OUTPATIENT
Start: 2023-11-20 | End: 2023-11-20

## 2023-11-20 RX ORDER — BACITRACIN ZINC AND POLYMYXIN B SULFATE 500; 1000 [USP'U]/G; [USP'U]/G
OINTMENT TOPICAL ONCE
OUTPATIENT
Start: 2023-11-20 | End: 2023-11-20

## 2023-11-20 RX ORDER — TRIAMCINOLONE ACETONIDE 1 MG/G
OINTMENT TOPICAL ONCE
OUTPATIENT
Start: 2023-11-20 | End: 2023-11-20

## 2023-11-20 RX ORDER — TRIAMCINOLONE ACETONIDE 1 MG/G
OINTMENT TOPICAL ONCE
Status: CANCELLED | OUTPATIENT
Start: 2023-11-20 | End: 2023-11-20

## 2023-11-20 RX ORDER — LIDOCAINE HYDROCHLORIDE 20 MG/ML
JELLY TOPICAL ONCE
Status: CANCELLED | OUTPATIENT
Start: 2023-11-20 | End: 2023-11-20

## 2023-11-20 RX ORDER — BETAMETHASONE DIPROPIONATE 0.5 MG/G
CREAM TOPICAL ONCE
OUTPATIENT
Start: 2023-11-20 | End: 2023-11-20

## 2023-11-20 RX ORDER — BETAMETHASONE DIPROPIONATE 0.5 MG/G
CREAM TOPICAL ONCE
Status: CANCELLED | OUTPATIENT
Start: 2023-11-20 | End: 2023-11-20

## 2023-11-20 RX ORDER — CLOBETASOL PROPIONATE 0.5 MG/G
OINTMENT TOPICAL ONCE
OUTPATIENT
Start: 2023-11-20 | End: 2023-11-20

## 2023-11-20 RX ORDER — LIDOCAINE 50 MG/G
OINTMENT TOPICAL ONCE
OUTPATIENT
Start: 2023-11-20 | End: 2023-11-20

## 2023-11-20 RX ORDER — BACITRACIN ZINC AND POLYMYXIN B SULFATE 500; 1000 [USP'U]/G; [USP'U]/G
OINTMENT TOPICAL ONCE
Status: CANCELLED | OUTPATIENT
Start: 2023-11-20 | End: 2023-11-20

## 2023-11-20 RX ORDER — GENTAMICIN SULFATE 1 MG/G
OINTMENT TOPICAL ONCE
OUTPATIENT
Start: 2023-11-20 | End: 2023-11-20

## 2023-11-20 RX ORDER — GENTAMICIN SULFATE 1 MG/G
OINTMENT TOPICAL ONCE
Status: CANCELLED | OUTPATIENT
Start: 2023-11-20 | End: 2023-11-20

## 2023-11-20 RX ORDER — LIDOCAINE HYDROCHLORIDE 40 MG/ML
SOLUTION TOPICAL ONCE
Status: CANCELLED | OUTPATIENT
Start: 2023-11-20 | End: 2023-11-20

## 2023-11-20 RX ORDER — LIDOCAINE 40 MG/G
CREAM TOPICAL ONCE
Status: CANCELLED | OUTPATIENT
Start: 2023-11-20 | End: 2023-11-20

## 2023-11-20 RX ORDER — LIDOCAINE HYDROCHLORIDE 40 MG/ML
SOLUTION TOPICAL ONCE
OUTPATIENT
Start: 2023-11-20 | End: 2023-11-20

## 2023-11-20 RX ORDER — SODIUM CHLOR/HYPOCHLOROUS ACID 0.033 %
SOLUTION, IRRIGATION IRRIGATION ONCE
OUTPATIENT
Start: 2023-11-20 | End: 2023-11-20

## 2023-11-20 RX ORDER — IBUPROFEN 200 MG
TABLET ORAL ONCE
OUTPATIENT
Start: 2023-11-20 | End: 2023-11-20

## 2023-11-20 RX ORDER — GINSENG 100 MG
CAPSULE ORAL ONCE
Status: CANCELLED | OUTPATIENT
Start: 2023-11-20 | End: 2023-11-20

## 2023-11-20 RX ORDER — CLOBETASOL PROPIONATE 0.5 MG/G
OINTMENT TOPICAL ONCE
Status: CANCELLED | OUTPATIENT
Start: 2023-11-20 | End: 2023-11-20

## 2023-11-20 NOTE — WOUND CARE
[facility]   Medical Staff Progress Note     Komal Krueger  MEDICAL RECORD NUMBER:  [id number]  AGE: 80 y.o. GENDER: female  : 1931  EPISODE DATE:  2023    Chief complaint and reason for visit:   Recurrent left leg ulcer  Chief Complaint   Patient presents with    Wound Check     Left legs      Patient presenting for follow up evaluation of wound(s) per chief complaint. Subjective: Symptoms, wound related issues, or other pertinent wound history since last visit: Patient was discharged on 2023 having healed her left leg ulcerations. She presents now with recurrent ulcerations of the left lower extremity. She has not been wearing her compression hose. Wound 23 Leg Left #1 (Active)   Wound Image   23 1341   Wound Etiology Venous 23 1341   Dressing Status Clean;Dry; Intact 23 1341   Wound Cleansed Cleansed with saline 23 1341   Wound Length (cm) 8.2 cm 23 1341   Wound Width (cm) 11.5 cm 23 1341   Wound Depth (cm) 0.1 cm 23 1341   Wound Surface Area (cm^2) 94.3 cm^2 23 1341   Wound Volume (cm^3) 9.43 cm^3 23 1341   Wound Assessment Granulation tissue;Slough 23 1341   Drainage Amount Moderate (25-50%) 23 1341   Drainage Description Serosanguinous 23 1341   Odor None 23 1341   Anamaria-wound Assessment Dry/flaky 23 1341   Margins Attached edges 23 1341   Wound Thickness Description not for Pressure Injury Full thickness 23 1341   Number of days: 0          Procedures done during this encounter:   No procedures    TIME: E/M Time spent with patient and/or patient care issues: [] 15-20 min  [] 21-30 min  [] 31-44 min  [] 45 min or more.    This is above the usual time needed to address patient's chief complaint today: [] Yes  [] No  This time includes physician non-face-to-face service time visit on the date of service such

## 2023-11-20 NOTE — WOUND CARE
Wound Clinic Physician Orders and Discharge Instructions  [address] [place] [address] S. [address]  Telephone: [phone]    NAME:  Dejon Osullivan  YOB: 1931  MEDICAL RECORD NUMBER:  [id number]  DATE:  11/20/2023      Return Appointment:  [] Dressing Supply Provider:   [x] Home Healthcare: Cornelio Marin   [x] Return Appointment: 1 Week(s)  [] Nurse Visit:     [] Discharge from [facility]: [] Healed        [] Refer to Provider:         [] Consult    Follow-up Information:  [] Ordered Tests:   [x] Referrals: Dr. Souleymane Holcomb (call and make appointment for 2nd opinion)  [] Rx:   [] Other:       Wound Cleansing:   Do not scrub or use excessive force. Cleanse wound prior to applying a clean dressing with:  [] Normal Saline   [] Keep Wound Dry in Shower     [] Wound Cleanser   [x] Cleanse wound with Mild Soap & Water    [] Other:       Topical Treatments:  Do not apply lotions, creams, or ointments to wound bed unless directed. [x] Apply moisturizing lotion to skin surrounding the wound prior to dressing change.  [] Apply antifungal ointment to skin surrounding the wound prior to dressing change.  [] Apply thin film of moisture barrier ointment to skin immediately around wound.   [] Apply Betadine to skin immediately around wound   [] Other:      Dressings:           Wound Location L Leg    [x] Apply Primary Dressing:       [] MediHoney Gel [] MediHoney Alginate  [x] Calcium Alginate with Silver  [] Calcium Alginate without silver   [] Collagen with silver to small open areas [] Collagen without Silver  [] Santyl with moist saline gauze     [] Hydrofera Blue (cut to size and moistened with normal saline)  [] Hydrofera Blue Ready (cut to size)      [] Normal Saline wet to dry  [] Betadine wet to dry    [] Hydrogel  [] Mepitel     [] Bactroban/Mupirocin   [] Iodoform Packing Strip [] Plain Packing Strip   [] Skin Sub:   [] Other:      [x] Cover

## 2023-11-20 NOTE — DISCHARGE INSTRUCTIONS
Wound Clinic Physician Orders and Discharge Instructions  [address] [place] [address] S. [address]  Telephone: [phone]    NAME:  Lily Bland  YOB: 1931  MEDICAL RECORD NUMBER:  [id number]  DATE:  11/20/2023      Return Appointment:  [] Dressing Supply Provider:   [x] Home Healthcare: Frances Almeida   [x] Return Appointment: 1 Week(s)  [] Nurse Visit:     [] Discharge from [facility]: [] Healed        [] Refer to Provider:         [] Consult    Follow-up Information:  [] Ordered Tests:   [x] Referrals: Dr. Adela Mendez (call and make appointment for 2nd opinion)  [] Rx:   [] Other:       Wound Cleansing:   Do not scrub or use excessive force. Cleanse wound prior to applying a clean dressing with:  [] Normal Saline   [] Keep Wound Dry in Shower     [] Wound Cleanser   [x] Cleanse wound with Mild Soap & Water    [] Other:       Topical Treatments:  Do not apply lotions, creams, or ointments to wound bed unless directed. [x] Apply moisturizing lotion to skin surrounding the wound prior to dressing change.  [] Apply antifungal ointment to skin surrounding the wound prior to dressing change.  [] Apply thin film of moisture barrier ointment to skin immediately around wound.   [] Apply Betadine to skin immediately around wound   [] Other:      Dressings:           Wound Location L Leg    [x] Apply Primary Dressing:       [] MediHoney Gel [] MediHoney Alginate  [x] Calcium Alginate with Silver  [] Calcium Alginate without silver   [] Collagen with silver to small open areas [] Collagen without Silver  [] Santyl with moist saline gauze     [] Hydrofera Blue (cut to size and moistened with normal saline)  [] Hydrofera Blue Ready (cut to size)      [] Normal Saline wet to dry  [] Betadine wet to dry    [] Hydrogel  [] Mepitel     [] Bactroban/Mupirocin   [] Iodoform Packing Strip [] Plain Packing Strip   [] Skin Sub:   [] Other:      [x] Cover

## 2023-11-27 ENCOUNTER — HOSPITAL ENCOUNTER (OUTPATIENT)
Facility: HOSPITAL | Age: 88
Discharge: HOME | End: 2023-11-27
Attending: SPECIALIST
Payer: MEDICARE

## 2023-11-27 VITALS
TEMPERATURE: 97.6 F | HEART RATE: 70 BPM | RESPIRATION RATE: 18 BRPM | SYSTOLIC BLOOD PRESSURE: 147 MMHG | DIASTOLIC BLOOD PRESSURE: 66 MMHG

## 2023-11-27 DIAGNOSIS — L97.909 VENOUS ULCER OF LOWER LEG WITHOUT VARICOSE VEINS (HCC): Primary | ICD-10-CM

## 2023-11-27 DIAGNOSIS — I87.2 VENOUS ULCER OF LOWER LEG WITHOUT VARICOSE VEINS (HCC): Primary | ICD-10-CM

## 2023-11-27 PROCEDURE — 11042 DBRDMT SUBQ TIS 1ST 20SQCM/<: CPT

## 2023-11-27 RX ORDER — SODIUM CHLOR/HYPOCHLOROUS ACID 0.033 %
SOLUTION, IRRIGATION IRRIGATION ONCE
OUTPATIENT
Start: 2023-11-27 | End: 2023-11-27

## 2023-11-27 RX ORDER — LIDOCAINE 40 MG/G
CREAM TOPICAL ONCE
OUTPATIENT
Start: 2023-11-27 | End: 2023-11-27

## 2023-11-27 RX ORDER — LIDOCAINE HYDROCHLORIDE 20 MG/ML
JELLY TOPICAL ONCE
OUTPATIENT
Start: 2023-11-27 | End: 2023-11-27

## 2023-11-27 RX ORDER — LIDOCAINE HYDROCHLORIDE 40 MG/ML
SOLUTION TOPICAL ONCE
OUTPATIENT
Start: 2023-11-27 | End: 2023-11-27

## 2023-11-27 RX ORDER — IBUPROFEN 200 MG
TABLET ORAL ONCE
OUTPATIENT
Start: 2023-11-27 | End: 2023-11-27

## 2023-11-27 RX ORDER — GINSENG 100 MG
CAPSULE ORAL ONCE
OUTPATIENT
Start: 2023-11-27 | End: 2023-11-27

## 2023-11-27 RX ORDER — BACITRACIN ZINC AND POLYMYXIN B SULFATE 500; 1000 [USP'U]/G; [USP'U]/G
OINTMENT TOPICAL ONCE
OUTPATIENT
Start: 2023-11-27 | End: 2023-11-27

## 2023-11-27 RX ORDER — CLOBETASOL PROPIONATE 0.5 MG/G
OINTMENT TOPICAL ONCE
OUTPATIENT
Start: 2023-11-27 | End: 2023-11-27

## 2023-11-27 RX ORDER — BETAMETHASONE DIPROPIONATE 0.5 MG/G
CREAM TOPICAL ONCE
OUTPATIENT
Start: 2023-11-27 | End: 2023-11-27

## 2023-11-27 RX ORDER — GENTAMICIN SULFATE 1 MG/G
OINTMENT TOPICAL ONCE
OUTPATIENT
Start: 2023-11-27 | End: 2023-11-27

## 2023-11-27 RX ORDER — LIDOCAINE 50 MG/G
OINTMENT TOPICAL ONCE
OUTPATIENT
Start: 2023-11-27 | End: 2023-11-27

## 2023-11-27 RX ORDER — TRIAMCINOLONE ACETONIDE 1 MG/G
OINTMENT TOPICAL ONCE
OUTPATIENT
Start: 2023-11-27 | End: 2023-11-27

## 2023-11-27 NOTE — WOUND CARE
Wound Clinic Physician Orders and Discharge Instructions  [address] [place] [address] S. 709 [facility], [address] [place] [address]  Telephone: [phone]    NAME:  Madison Joe  YOB: 1931  MEDICAL RECORD NUMBER:  [id number]  DATE:  11/27/2023      Return Appointment:  [] Dressing Supply Provider:   [x] Home Healthcare: Vannessa Leal   [x] Return Appointment: 1 Week(s)  [] Nurse Visit:     [] Discharge from [facility]: [] Healed        [] Refer to Provider:         [] Consult    Follow-up Information:  [] Ordered Tests:   [x] Referrals: Dr. Denise Coronel  [] Rx:   [] Other:       Wound Cleansing:   Do not scrub or use excessive force. Cleanse wound prior to applying a clean dressing with:  [] Normal Saline   [] Keep Wound Dry in Shower     [] Wound Cleanser   [x] Cleanse wound with Mild Soap & Water    [] Other:       Topical Treatments:  Do not apply lotions, creams, or ointments to wound bed unless directed. [x] Apply moisturizing lotion to skin surrounding the wound prior to dressing change.  [] Apply antifungal ointment to skin surrounding the wound prior to dressing change.  [] Apply thin film of moisture barrier ointment to skin immediately around wound.   [] Apply Betadine to skin immediately around wound   [] Other:      Dressings:           Wound Location L Leg    [x] Apply Primary Dressing:       [] MediHoney Gel [] MediHoney Alginate  [x] Calcium Alginate with Silver to open areas [] Calcium Alginate without silver   [] Collagen with silver to small open areas [] Collagen without Silver  [] Santyl with moist saline gauze     [] Hydrofera Blue (cut to size and moistened with normal saline)  [] Hydrofera Blue Ready (cut to size)      [] Normal Saline wet to dry  [] Betadine wet to dry    [] Hydrogel  [] Mepitel     [] Bactroban/Mupirocin   [] Iodoform Packing Strip [] Plain Packing Strip   [] Skin Sub:   [] Other:      [x] Cover and Secure with:     [x] Non

## 2023-11-27 NOTE — DISCHARGE INSTRUCTIONS
Wound Clinic Physician Orders and Discharge Instructions  [address] [place], [place] [id number] S. 709 [facility], [address] [place] [address] Way  Telephone: [phone]    NAME:  Dai Watkins  YOB: 1931  MEDICAL RECORD NUMBER:  [id number]  DATE:  11/27/2023      Return Appointment:  [] Dressing Supply Provider:   [x] Home Healthcare: Arabella Huizar   [x] Return Appointment: 1 Week(s)  [] Nurse Visit:     [] Discharge from [facility]: [] Healed        [] Refer to Provider:         [] Consult    Follow-up Information:  [] Ordered Tests:   [x] Referrals: Dr. Kari Arredondo  [] Rx:   [] Other:       Wound Cleansing:   Do not scrub or use excessive force. Cleanse wound prior to applying a clean dressing with:  [] Normal Saline   [] Keep Wound Dry in Shower     [] Wound Cleanser   [x] Cleanse wound with Mild Soap & Water    [] Other:       Topical Treatments:  Do not apply lotions, creams, or ointments to wound bed unless directed. [x] Apply moisturizing lotion to skin surrounding the wound prior to dressing change.  [] Apply antifungal ointment to skin surrounding the wound prior to dressing change.  [] Apply thin film of moisture barrier ointment to skin immediately around wound.   [] Apply Betadine to skin immediately around wound   [] Other:      Dressings:           Wound Location L Leg    [x] Apply Primary Dressing:       [] MediHoney Gel [] MediHoney Alginate  [x] Calcium Alginate with Silver to open areas [] Calcium Alginate without silver   [] Collagen with silver to small open areas [] Collagen without Silver  [] Santyl with moist saline gauze     [] Hydrofera Blue (cut to size and moistened with normal saline)  [] Hydrofera Blue Ready (cut to size)      [] Normal Saline wet to dry  [] Betadine wet to dry    [] Hydrogel  [] Mepitel     [] Bactroban/Mupirocin   [] Iodoform Packing Strip [] Plain Packing Strip   [] Skin Sub:   [] Other:      [x] Cover and Secure with:     [x] Non Woven

## 2023-11-27 NOTE — WOUND CARE
[facility]   Medical Staff Progress Note     Dejon Livingston  MEDICAL RECORD NUMBER:  [id number]  AGE: 80 y.o. GENDER: female  : 1931  EPISODE DATE:  2023    Chief complaint and reason for visit:   Left leg wounds no new problems reported. The patient does complain of pain in the wounded area especially at night. No fever, no drainage. Appointment with Dr. Souleymane Holcomb this week. Chief Complaint   Patient presents with    Wound Check     Left leg      Patient presenting for follow up evaluation of wound(s) per chief complaint. Subjective: Symptoms, wound related issues, or other pertinent wound history since last visit: No changes to history since last visit    Wound 23 Leg Left #1 (Active)   Wound Image   23   Wound Etiology Venous 23   Dressing Status Clean;Dry; Intact 23   Wound Cleansed Cleansed with saline 23   Wound Length (cm) 7 cm 23   Wound Width (cm) 6.5 cm 23   Wound Depth (cm) 0.1 cm 23   Wound Surface Area (cm^2) 45.5 cm^2 23   Change in Wound Size % (l*w) 51.75 23   Wound Volume (cm^3) 4.55 cm^3 23   Wound Healing % 52 23 134   Post-Procedure Length (cm) 7 cm 23 135   Post-Procedure Width (cm) 6.5 cm 23   Post-Procedure Depth (cm) 0.1 cm 23   Post-Procedure Surface Area (cm^2) 45.5 cm^2 23 135   Post-Procedure Volume (cm^3) 4.55 cm^3 23 135   Wound Assessment Granulation tissue;Slough 23   Drainage Amount Moderate (25-50%) 23   Drainage Description Serosanguinous 23   Odor None 23   Anamaria-wound Assessment Dry/flaky; Maceration 11/27/23 1347   Margins Attached edges 11/27/23 1347   Wound Thickness Description not for Pressure Injury Full thickness 23 1347   Number of days: 7          Procedures done during

## 2023-12-04 ENCOUNTER — HOSPITAL ENCOUNTER (OUTPATIENT)
Facility: HOSPITAL | Age: 88
Discharge: HOME | End: 2023-12-04
Attending: SPECIALIST
Payer: MEDICARE

## 2023-12-04 VITALS
HEART RATE: 78 BPM | SYSTOLIC BLOOD PRESSURE: 105 MMHG | TEMPERATURE: 98.1 F | DIASTOLIC BLOOD PRESSURE: 55 MMHG | RESPIRATION RATE: 18 BRPM

## 2023-12-04 DIAGNOSIS — L97.909 VENOUS ULCER OF LOWER LEG WITHOUT VARICOSE VEINS (HCC): Primary | ICD-10-CM

## 2023-12-04 DIAGNOSIS — I87.2 VENOUS ULCER OF LOWER LEG WITHOUT VARICOSE VEINS (HCC): Primary | ICD-10-CM

## 2023-12-04 PROCEDURE — 99213 OFFICE O/P EST LOW 20 MIN: CPT

## 2023-12-04 RX ORDER — GINSENG 100 MG
CAPSULE ORAL ONCE
OUTPATIENT
Start: 2023-12-04 | End: 2023-12-04

## 2023-12-04 RX ORDER — LIDOCAINE HYDROCHLORIDE 20 MG/ML
JELLY TOPICAL ONCE
OUTPATIENT
Start: 2023-12-04 | End: 2023-12-04

## 2023-12-04 RX ORDER — CLOBETASOL PROPIONATE 0.5 MG/G
OINTMENT TOPICAL ONCE
OUTPATIENT
Start: 2023-12-04 | End: 2023-12-04

## 2023-12-04 RX ORDER — TRIAMCINOLONE ACETONIDE 1 MG/G
OINTMENT TOPICAL ONCE
OUTPATIENT
Start: 2023-12-04 | End: 2023-12-04

## 2023-12-04 RX ORDER — GENTAMICIN SULFATE 1 MG/G
OINTMENT TOPICAL ONCE
OUTPATIENT
Start: 2023-12-04 | End: 2023-12-04

## 2023-12-04 RX ORDER — SODIUM CHLOR/HYPOCHLOROUS ACID 0.033 %
SOLUTION, IRRIGATION IRRIGATION ONCE
OUTPATIENT
Start: 2023-12-04 | End: 2023-12-04

## 2023-12-04 RX ORDER — BETAMETHASONE DIPROPIONATE 0.5 MG/G
CREAM TOPICAL ONCE
OUTPATIENT
Start: 2023-12-04 | End: 2023-12-04

## 2023-12-04 RX ORDER — LIDOCAINE 40 MG/G
CREAM TOPICAL ONCE
OUTPATIENT
Start: 2023-12-04 | End: 2023-12-04

## 2023-12-04 RX ORDER — LIDOCAINE HYDROCHLORIDE 40 MG/ML
SOLUTION TOPICAL ONCE
OUTPATIENT
Start: 2023-12-04 | End: 2023-12-04

## 2023-12-04 RX ORDER — BACITRACIN ZINC AND POLYMYXIN B SULFATE 500; 1000 [USP'U]/G; [USP'U]/G
OINTMENT TOPICAL ONCE
OUTPATIENT
Start: 2023-12-04 | End: 2023-12-04

## 2023-12-04 RX ORDER — LIDOCAINE 50 MG/G
OINTMENT TOPICAL ONCE
OUTPATIENT
Start: 2023-12-04 | End: 2023-12-04

## 2023-12-04 RX ORDER — IBUPROFEN 200 MG
TABLET ORAL ONCE
OUTPATIENT
Start: 2023-12-04 | End: 2023-12-04

## 2023-12-04 NOTE — WOUND CARE
Wound Clinic Physician Orders and Discharge Instructions  [address] [place] [address] S. 709 [facility], [address] [place] [address] Way  Telephone: [phone]    NAME:  Elizabeth Chin  YOB: 1931  MEDICAL RECORD NUMBER:  [id number]  DATE:  12/4/2023      Return Appointment:  [] Dressing Supply Provider:   [x] Home Healthcare: Ezekiel Barrios   [x] Return Appointment: 1 Week(s)  [] Nurse Visit:     [] Discharge from [facility]: [] Healed        [] Refer to Provider:         [] Consult    Follow-up Information:  [] Ordered Tests:   [x] Referrals: Dr. Lauryn Townsend  [] Rx:   [] Other:       Wound Cleansing:   Do not scrub or use excessive force. Cleanse wound prior to applying a clean dressing with:  [] Normal Saline   [] Keep Wound Dry in Shower     [] Wound Cleanser   [x] Cleanse wound with Mild Soap & Water    [] Other:       Topical Treatments:  Do not apply lotions, creams, or ointments to wound bed unless directed. [x] Apply moisturizing lotion to skin surrounding the wound prior to dressing change.  [] Apply antifungal ointment to skin surrounding the wound prior to dressing change.  [] Apply thin film of moisture barrier ointment to skin immediately around wound.   [] Apply Betadine to skin immediately around wound   [] Other:      Dressings:           Wound Location L Leg    [x] Apply Primary Dressing:       [] MediHoney Gel [] MediHoney Alginate  [x] Calcium Alginate with Silver to open areas [] Calcium Alginate without silver   [] Collagen with silver to small open areas [] Collagen without Silver  [] Santyl with moist saline gauze     [] Hydrofera Blue (cut to size and moistened with normal saline)  [] Hydrofera Blue Ready (cut to size)      [] Normal Saline wet to dry  [] Betadine wet to dry    [] Hydrogel  [] Mepitel     [] Bactroban/Mupirocin   [] Iodoform Packing Strip [] Plain Packing Strip   [] Skin Sub:   [] Other:      [x] Cover and Secure with:     [x] Non

## 2023-12-04 NOTE — WOUND CARE
[facility] [place]   Medical Staff Progress Note     Komal Krueger  MEDICAL RECORD NUMBER:  [id number]  AGE: 80 y.o. GENDER: female  : 1931  EPISODE DATE:  2023    Chief complaint and reason for visit:   Left leg wound  Chief Complaint   Patient presents with    Wound Check     L leg      Patient presenting for follow up evaluation of wound(s) per chief complaint. Subjective: Symptoms, wound related issues, or other pertinent wound history since last visit: No new problems reported. Some pain in the left leg persist.     Wound 23 Leg Left #1 (Active)   Wound Image   23   Wound Etiology Venous 23   Dressing Status Clean;Dry; Intact 23   Wound Cleansed Cleansed with saline 23   Dressing/Treatment Alginate with Ag;Gauze dressing/dressing sponge;Dry dressing;Tubular bandage 23 1418   Wound Length (cm) 6 cm 23   Wound Width (cm) 5 cm 23   Wound Depth (cm) 0.1 cm 23   Wound Surface Area (cm^2) 30 cm^2 23   Change in Wound Size % (l*w) 68.19 23   Wound Volume (cm^3) 3 cm^3 23   Wound Healing % 68 23   Post-Procedure Length (cm) 7 cm 23   Post-Procedure Width (cm) 6.5 cm 23   Post-Procedure Depth (cm) 0.1 cm 23   Post-Procedure Surface Area (cm^2) 45.5 cm^2 23   Post-Procedure Volume (cm^3) 4.55 cm^3 23   Wound Assessment Granulation tissue;Slough 23   Drainage Amount Moderate (25-50%) 23   Drainage Description Serosanguinous 23   Odor None 23   Anamaria-wound Assessment Dry/flaky 23   Margins Attached edges 23   Wound Thickness Description not for Pressure Injury Full thickness 233   Number of days: 14          Procedures done during this encounter:   None    TIME: E/M Time spent with

## 2023-12-04 NOTE — DISCHARGE INSTRUCTIONS
Wound Clinic Physician Orders and Discharge Instructions  [address] [place], [place] [address] S. 709 [facility], [address] [place] [address] Way  Telephone: [phone]    NAME:  Bella Gomes  YOB: 1931  MEDICAL RECORD NUMBER:  [id number]  DATE:  12/4/2023      Return Appointment:  [] Dressing Supply Provider:   [x] Home Healthcare: Lisa Arnold   [x] Return Appointment: 1 Week(s)  [] Nurse Visit:     [] Discharge from [facility]: [] Healed        [] Refer to Provider:         [] Consult    Follow-up Information:  [] Ordered Tests:   [x] Referrals: Dr. Jeanine Fitch  [] Rx:   [] Other:       Wound Cleansing:   Do not scrub or use excessive force. Cleanse wound prior to applying a clean dressing with:  [] Normal Saline   [] Keep Wound Dry in Shower     [] Wound Cleanser   [x] Cleanse wound with Mild Soap & Water    [] Other:       Topical Treatments:  Do not apply lotions, creams, or ointments to wound bed unless directed. [x] Apply moisturizing lotion to skin surrounding the wound prior to dressing change.  [] Apply antifungal ointment to skin surrounding the wound prior to dressing change.  [] Apply thin film of moisture barrier ointment to skin immediately around wound.   [] Apply Betadine to skin immediately around wound   [] Other:      Dressings:           Wound Location L Leg    [x] Apply Primary Dressing:       [] MediHoney Gel [] MediHoney Alginate  [x] Calcium Alginate with Silver to open areas [] Calcium Alginate without silver   [] Collagen with silver to small open areas [] Collagen without Silver  [] Santyl with moist saline gauze     [] Hydrofera Blue (cut to size and moistened with normal saline)  [] Hydrofera Blue Ready (cut to size)      [] Normal Saline wet to dry  [] Betadine wet to dry    [] Hydrogel  [] Mepitel     [] Bactroban/Mupirocin   [] Iodoform Packing Strip [] Plain Packing Strip   [] Skin Sub:   [] Other:      [x] Cover and Secure with:     [x] Non

## 2023-12-11 ENCOUNTER — HOSPITAL ENCOUNTER (OUTPATIENT)
Facility: HOSPITAL | Age: 88
Discharge: HOME | End: 2023-12-11
Attending: SPECIALIST
Payer: MEDICARE

## 2023-12-11 VITALS
TEMPERATURE: 97.9 F | DIASTOLIC BLOOD PRESSURE: 61 MMHG | HEART RATE: 68 BPM | RESPIRATION RATE: 18 BRPM | SYSTOLIC BLOOD PRESSURE: 141 MMHG

## 2023-12-11 DIAGNOSIS — I87.2 VENOUS ULCER OF LOWER LEG WITHOUT VARICOSE VEINS (HCC): Primary | ICD-10-CM

## 2023-12-11 DIAGNOSIS — L97.909 VENOUS ULCER OF LOWER LEG WITHOUT VARICOSE VEINS (HCC): Primary | ICD-10-CM

## 2023-12-11 PROCEDURE — 99213 OFFICE O/P EST LOW 20 MIN: CPT

## 2023-12-11 RX ORDER — GINSENG 100 MG
CAPSULE ORAL ONCE
OUTPATIENT
Start: 2023-12-11 | End: 2023-12-11

## 2023-12-11 RX ORDER — LIDOCAINE HYDROCHLORIDE 20 MG/ML
JELLY TOPICAL ONCE
OUTPATIENT
Start: 2023-12-11 | End: 2023-12-11

## 2023-12-11 RX ORDER — LIDOCAINE HYDROCHLORIDE 40 MG/ML
SOLUTION TOPICAL ONCE
OUTPATIENT
Start: 2023-12-11 | End: 2023-12-11

## 2023-12-11 RX ORDER — SODIUM CHLOR/HYPOCHLOROUS ACID 0.033 %
SOLUTION, IRRIGATION IRRIGATION ONCE
OUTPATIENT
Start: 2023-12-11 | End: 2023-12-11

## 2023-12-11 RX ORDER — LIDOCAINE 50 MG/G
OINTMENT TOPICAL ONCE
OUTPATIENT
Start: 2023-12-11 | End: 2023-12-11

## 2023-12-11 RX ORDER — LIDOCAINE 40 MG/G
CREAM TOPICAL ONCE
OUTPATIENT
Start: 2023-12-11 | End: 2023-12-11

## 2023-12-11 RX ORDER — IBUPROFEN 200 MG
TABLET ORAL ONCE
OUTPATIENT
Start: 2023-12-11 | End: 2023-12-11

## 2023-12-11 RX ORDER — TRIAMCINOLONE ACETONIDE 1 MG/G
OINTMENT TOPICAL ONCE
OUTPATIENT
Start: 2023-12-11 | End: 2023-12-11

## 2023-12-11 RX ORDER — BACITRACIN ZINC AND POLYMYXIN B SULFATE 500; 1000 [USP'U]/G; [USP'U]/G
OINTMENT TOPICAL ONCE
OUTPATIENT
Start: 2023-12-11 | End: 2023-12-11

## 2023-12-11 RX ORDER — BETAMETHASONE DIPROPIONATE 0.5 MG/G
CREAM TOPICAL ONCE
OUTPATIENT
Start: 2023-12-11 | End: 2023-12-11

## 2023-12-11 RX ORDER — GENTAMICIN SULFATE 1 MG/G
OINTMENT TOPICAL ONCE
OUTPATIENT
Start: 2023-12-11 | End: 2023-12-11

## 2023-12-11 RX ORDER — CLOBETASOL PROPIONATE 0.5 MG/G
OINTMENT TOPICAL ONCE
OUTPATIENT
Start: 2023-12-11 | End: 2023-12-11

## 2023-12-11 NOTE — WOUND CARE
Wound Clinic Physician Orders and Discharge Instructions  [address] [place] [address] S. 709 [facility], [address] [place] [address] Way  Telephone: [phone]    NAME:  Kizzy Vargas  YOB: 1931  MEDICAL RECORD NUMBER:  [id number]  DATE:  12/11/2023      Return Appointment:  [] Dressing Supply Provider:   [x] Home Healthcare: [employer] (do not use Telfa non-stick pads)  [x] Return Appointment: 1 Week(s)  [] Nurse Visit:     [] Discharge from [facility]: [] Healed        [] Refer to Provider:         [] Consult    Follow-up Information:  [] Ordered Tests:   [x] Referrals: Dr. Cesario Mckinney  [] Rx:   [] Other:       Wound Cleansing:   Do not scrub or use excessive force. Cleanse wound prior to applying a clean dressing with:  [] Normal Saline   [] Keep Wound Dry in Shower     [] Wound Cleanser   [x] Cleanse wound with Mild Soap & Water    [] Other:       Topical Treatments:  Do not apply lotions, creams, or ointments to wound bed unless directed. [x] Apply moisturizing lotion to skin surrounding the wound prior to dressing change.  [] Apply antifungal ointment to skin surrounding the wound prior to dressing change.  [] Apply thin film of moisture barrier ointment to skin immediately around wound.   [] Apply Betadine to skin immediately around wound   [] Other:      Dressings:           Wound Location L Leg    [x] Apply Primary Dressing:       [] MediHoney Gel [] MediHoney Alginate  [x] Calcium Alginate with Silver to small open area only  [] Calcium Alginate without silver   [] Collagen with silver to small open areas [] Collagen without Silver  [] Santyl with moist saline gauze     [] Hydrofera Blue (cut to size and moistened with normal saline)  [] Hydrofera Blue Ready (cut to size)      [] Normal Saline wet to dry  [] Betadine wet to dry    [] Hydrogel  [] Mepitel     [] Bactroban/Mupirocin   [] Iodoform Packing Strip [] Plain Packing Strip   [] Skin Sub:   [] Other:

## 2023-12-11 NOTE — DISCHARGE INSTRUCTIONS
Wound Clinic Physician Orders and Discharge Instructions  [address] [place] [address] S. 709 [facility], [address] [place] [address] Way  Telephone: [phone]    NAME:  Anish Driver  YOB: 1931  MEDICAL RECORD NUMBER:  [id number]  DATE:  12/11/2023      Return Appointment:  [] Dressing Supply Provider:   [x] Home Healthcare: [employer] (do not use Telfa non-stick pads)  [x] Return Appointment: 1 Week(s)  [] Nurse Visit:     [] Discharge from [facility]: [] Healed        [] Refer to Provider:         [] Consult    Follow-up Information:  [] Ordered Tests:   [x] Referrals: Dr. Vincent Chua  [] Rx:   [] Other:       Wound Cleansing:   Do not scrub or use excessive force. Cleanse wound prior to applying a clean dressing with:  [] Normal Saline   [] Keep Wound Dry in Shower     [] Wound Cleanser   [x] Cleanse wound with Mild Soap & Water    [] Other:       Topical Treatments:  Do not apply lotions, creams, or ointments to wound bed unless directed. [x] Apply moisturizing lotion to skin surrounding the wound prior to dressing change.  [] Apply antifungal ointment to skin surrounding the wound prior to dressing change.  [] Apply thin film of moisture barrier ointment to skin immediately around wound.   [] Apply Betadine to skin immediately around wound   [] Other:      Dressings:           Wound Location L Leg    [x] Apply Primary Dressing:       [] MediHoney Gel [] MediHoney Alginate  [x] Calcium Alginate with Silver to open areas only  [] Calcium Alginate without silver   [] Collagen with silver to small open areas [] Collagen without Silver  [] Santyl with moist saline gauze     [] Hydrofera Blue (cut to size and moistened with normal saline)  [] Hydrofera Blue Ready (cut to size)      [] Normal Saline wet to dry  [] Betadine wet to dry    [] Hydrogel  [] Mepitel     [] Bactroban/Mupirocin   [] Iodoform Packing Strip [] Plain Packing Strip   [] Skin Sub:   [] Other:

## 2024-01-15 ENCOUNTER — HOSPITAL ENCOUNTER (OUTPATIENT)
Facility: HOSPITAL | Age: 89
Discharge: HOME | End: 2024-01-15
Attending: SPECIALIST
Payer: MEDICARE

## 2024-01-15 VITALS
SYSTOLIC BLOOD PRESSURE: 153 MMHG | DIASTOLIC BLOOD PRESSURE: 69 MMHG | HEART RATE: 84 BPM | RESPIRATION RATE: 18 BRPM | TEMPERATURE: 97.5 F

## 2024-01-15 DIAGNOSIS — I87.2 VENOUS ULCER OF LOWER LEG WITHOUT VARICOSE VEINS (HCC): Primary | ICD-10-CM

## 2024-01-15 DIAGNOSIS — L97.909 VENOUS ULCER OF LOWER LEG WITHOUT VARICOSE VEINS (HCC): Primary | ICD-10-CM

## 2024-01-15 PROCEDURE — 99212 OFFICE O/P EST SF 10 MIN: CPT

## 2024-01-15 RX ORDER — IBUPROFEN 200 MG
TABLET ORAL ONCE
Status: CANCELLED | OUTPATIENT
Start: 2024-01-15 | End: 2024-01-15

## 2024-01-15 RX ORDER — TRIAMCINOLONE ACETONIDE 1 MG/G
OINTMENT TOPICAL ONCE
Status: CANCELLED | OUTPATIENT
Start: 2024-01-15 | End: 2024-01-15

## 2024-01-15 RX ORDER — GENTAMICIN SULFATE 1 MG/G
OINTMENT TOPICAL ONCE
Status: CANCELLED | OUTPATIENT
Start: 2024-01-15 | End: 2024-01-15

## 2024-01-15 RX ORDER — SODIUM CHLOR/HYPOCHLOROUS ACID 0.033 %
SOLUTION, IRRIGATION IRRIGATION ONCE
Status: CANCELLED | OUTPATIENT
Start: 2024-01-15 | End: 2024-01-15

## 2024-01-15 RX ORDER — CLOBETASOL PROPIONATE 0.5 MG/G
OINTMENT TOPICAL ONCE
Status: CANCELLED | OUTPATIENT
Start: 2024-01-15 | End: 2024-01-15

## 2024-01-15 RX ORDER — BACITRACIN ZINC AND POLYMYXIN B SULFATE 500; 1000 [USP'U]/G; [USP'U]/G
OINTMENT TOPICAL ONCE
Status: CANCELLED | OUTPATIENT
Start: 2024-01-15 | End: 2024-01-15

## 2024-01-15 RX ORDER — GINSENG 100 MG
CAPSULE ORAL ONCE
Status: CANCELLED | OUTPATIENT
Start: 2024-01-15 | End: 2024-01-15

## 2024-01-15 RX ORDER — LIDOCAINE HYDROCHLORIDE 40 MG/ML
SOLUTION TOPICAL ONCE
Status: CANCELLED | OUTPATIENT
Start: 2024-01-15 | End: 2024-01-15

## 2024-01-15 RX ORDER — LIDOCAINE HYDROCHLORIDE 20 MG/ML
JELLY TOPICAL ONCE
Status: CANCELLED | OUTPATIENT
Start: 2024-01-15 | End: 2024-01-15

## 2024-01-15 RX ORDER — LIDOCAINE 40 MG/G
CREAM TOPICAL ONCE
Status: CANCELLED | OUTPATIENT
Start: 2024-01-15 | End: 2024-01-15

## 2024-01-15 RX ORDER — LIDOCAINE 50 MG/G
OINTMENT TOPICAL ONCE
Status: CANCELLED | OUTPATIENT
Start: 2024-01-15 | End: 2024-01-15

## 2024-01-15 RX ORDER — BETAMETHASONE DIPROPIONATE 0.5 MG/G
CREAM TOPICAL ONCE
Status: CANCELLED | OUTPATIENT
Start: 2024-01-15 | End: 2024-01-15

## 2024-01-15 NOTE — WOUND CARE
Wound Clinic Physician Orders and Discharge Instructions  [facility]  [address], [address]  [place] [address]  Telephone: [phone]     FAX [phone]    NAME:  Maggie Lawson  YOB: 1931  MEDICAL RECORD NUMBER:  [id number]  DATE:  1/15/2024      Return Appointment:  [] Dressing Supply Provider:   [x] Home Healthcare: ALLY [facility]n [facility] wound is healed   [] Return Appointment: Week(s)  [] Nurse Visit:     [x] Discharge from [facility]: [x] Healed        [] Refer to Provider:         [] Consult    Follow-up Information:  [] Ordered Tests:   [x] Referrals: Dr. Crane  [] Rx:   [] Other:       Wound Cleansing:   Do not scrub or use excessive force.  Cleanse wound prior to applying a clean dressing with:  [] Normal Saline   [] Keep Wound Dry in Shower     [] Wound Cleanser   [x] Cleanse with Mild Soap & Water    [] Other:       Topical Treatments:  Do not apply lotions, creams, or ointments to wound bed unless directed.   [x] Apply moisturizing lotion to skin surrounding the wound prior to dressing change.  [] Apply antifungal ointment to skin surrounding the wound prior to dressing change.  [] Apply thin film of moisture barrier ointment to skin immediately around wound.  [] Apply Betadine to skin immediately around wound   [] Other:      Edema Control:  Apply: [] Compression Stockin-30 mmHg  [x]Right Leg [x]Left Leg   [x] Tubigrip []Right Leg Double Layer []Left Leg Double Layer      []Right Leg Single Layer [x]Left Leg Single Layer      [x] Elevate leg(s) above the level of the heart when sitting.    [x] Avoid prolonged standing in one place.     Dietary:  [x] Diet as tolerated: [] Calorie Diabetic Diet: [] No Added Salt:  [x] Increase Protein: [] Other:     Activity:  [x] Activity as tolerated:    [] Patient has no activity restrictions      [] Strict Bedrest   [] Remain off Work      [] May return to full duty work                                     []

## 2024-01-15 NOTE — CONSULTS
[facility]   [facility]   Medical Staff Note     Maggie Lawson  MEDICAL RECORD NUMBER:  [id number]  AGE: 89 y.o.   GENDER: female  : 1931  EPISODE DATE:  1/15/2024      Chief Complaint:   Chief Complaint   Patient presents with    Wound Check     L leg       History of Present Illness:     Maggie Lawson is a 89 y.o. female who presents today for wound/ulcer evaluation. She had left lower extremity ulcers and has been following with Dr. Cunningham.     History of Wound Context: Per original history and physical on this patient. 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    Contributing Factors: HEALTH FACTORS: edema and venous stasis    Wound: Left leg ulcers venous stasis & traumatic     Past Medical History:       Diagnosis Date    High cholesterol     High cholesterol     HTN (hypertension)     Hypertension     Hypothyroidism     Hypothyroidism        Past Surgical History:   Past Surgical History:   Procedure Laterality Date    BREAST LUMPECTOMY      HYSTERECTOMY (CERVIX STATUS UNKNOWN)         Allergy:No Known Allergies    Social History:   Social History     Tobacco Use    Smoking status: Former    Smokeless tobacco: Never   Vaping Use    Vaping Use: Never used   Substance Use Topics    Alcohol use: Not Currently    Drug use: Never       Family History:   Family History   Problem Relation Age of Onset    Heart Disease Mother     Cancer Father        Current Medications:  Current Outpatient Medications on File Prior to Encounter   Medication Sig Dispense Refill    gemfibrozil (LOPID) 600 MG tablet

## 2024-01-15 NOTE — DISCHARGE INSTRUCTIONS
Wound Clinic Physician Orders and Discharge Instructions  [facility]  [address], [address]  [place] [address]  Telephone: [phone]     FAX [phone]    NAME:  Maggie Lawson  YOB: 1931  MEDICAL RECORD NUMBER:  [id number]  DATE:  1/15/2024      Return Appointment:  [] Dressing Supply Provider:   [x] Home Healthcare: [facility]Y Vaughn HH wound is healed   [] Return Appointment: Week(s)  [] Nurse Visit:     [x] Discharge from [facility]: [x] Healed        [] Refer to Provider:         [] Consult    Follow-up Information:  [] Ordered Tests:   [x] Referrals: Dr. Crane  [] Rx:   [] Other:       Wound Cleansing:   Do not scrub or use excessive force.  Cleanse wound prior to applying a clean dressing with:  [] Normal Saline   [] Keep Wound Dry in Shower     [] Wound Cleanser   [x] Cleanse with Mild Soap & Water    [] Other:       Topical Treatments:  Do not apply lotions, creams, or ointments to wound bed unless directed.   [x] Apply moisturizing lotion to skin surrounding the wound prior to dressing change.  [] Apply antifungal ointment to skin surrounding the wound prior to dressing change.  [] Apply thin film of moisture barrier ointment to skin immediately around wound.  [] Apply Betadine to skin immediately around wound   [] Other:      Edema Control:  Apply: [] Compression Stockin-30 mmHg  [x]Right Leg [x]Left Leg   [x] Tubigrip []Right Leg Double Layer []Left Leg Double Layer      []Right Leg Single Layer [x]Left Leg Single Layer      [x] Elevate leg(s) above the level of the heart when sitting.    [x] Avoid prolonged standing in one place.     Dietary:  [x] Diet as tolerated: [] Calorie Diabetic Diet: [] No Added Salt:  [x] Increase Protein: [] Other:     Activity:  [x] Activity as tolerated:    [] Patient has no activity restrictions      [] Strict Bedrest   [] Remain off Work      [] May return to full duty work                                     []

## 2024-01-22 NOTE — WOUND CARE
Wound Clinic Physician Orders and Discharge Instructions  [facility]  [address], [address] [facility] [address], [address] [facility]  Telephone: [phone]    NAME:  Gilbert Segura  YOB: 1931  MEDICAL RECORD NUMBER:  [id number]  DATE:  6/19/2023      Return Appointment:  [] Dressing Supply Provider:   [x] Home Healthcare: Wander [facility]  [x] Return Appointment: 1 Week(s)  [] Nurse Visit:     [] Discharge from [facility]: [] Healed        [] Refer to Provider:         [] Consult    Follow-up Information:  [] Ordered Tests:   [] Referrals:   [] Rx:   [] Other:       Wound Cleansing:   Do not scrub or use excessive force. Cleanse wound prior to applying a clean dressing with:  [] Normal Saline   [] Keep Wound Dry in Shower     [] Wound Cleanser   [x] Cleanse wound with Mild Soap & Water    [] Other:       Topical Treatments:  Do not apply lotions, creams, or ointments to wound bed unless directed. [x] Apply moisturizing lotion to skin surrounding the wound prior to dressing change.  [] Apply antifungal ointment to skin surrounding the wound prior to dressing change.  [] Apply thin film of moisture barrier ointment to skin immediately around wound.   [] Apply Betadine to skin immediately around wound   [] Other:      Dressings:           Wound Location L Leg    [x] Apply Primary Dressing:       [] MediHoney Gel [] MediHoney Alginate  [] Calcium Alginate with Silver   [x] Calcium Alginate without silver   [] Collagen with silver [] Collagen without Silver    [] Santyl with moist saline gauze     [] Hydrofera Blue (cut to size and moistened with normal saline)  [] Hydrofera Blue Ready (cut to size)      [] Normal Saline wet to dry  [] Betadine wet to dry    [] Hydrogel  [] Mepitel     [] Bactroban/Mupirocin   [] Iodoform Packing Strip [] Plain Packing Strip   [] Skin Sub:   [] Other:      [x] Cover and Secure with:     [x] Gauze [x] Belkis or border [] Kerlix   [] Foam  []
Miguel Patterson MD on 6/19/2023 at 2:16 PM
[Consultation] : a consultation visit
[FreeTextEntry1] : Consultation requested by: Dr. Alicja Giles

## 2024-01-23 ENCOUNTER — HOSPITAL ENCOUNTER (OUTPATIENT)
Facility: HOSPITAL | Age: 89
Discharge: HOME | End: 2024-01-23
Attending: SPECIALIST
Payer: MEDICARE

## 2024-01-23 VITALS
TEMPERATURE: 97.5 F | HEART RATE: 77 BPM | BODY MASS INDEX: 21.35 KG/M2 | HEIGHT: 62 IN | WEIGHT: 116 LBS | RESPIRATION RATE: 18 BRPM | DIASTOLIC BLOOD PRESSURE: 52 MMHG | SYSTOLIC BLOOD PRESSURE: 145 MMHG

## 2024-01-23 PROCEDURE — 99213 OFFICE O/P EST LOW 20 MIN: CPT

## 2024-01-23 PROCEDURE — 99212 OFFICE O/P EST SF 10 MIN: CPT

## 2024-01-23 NOTE — DISCHARGE INSTRUCTIONS
Kimmie Fitzpatrick RN  Registered Nurse     Wound Care      Signed     Date of Service: 2024  1:00 PM     Signed                          Wound Clinic Physician Orders and Discharge Instructions  [facility]  [facility] [address]  [place] [id number]  Telephone: [phone]     FAX [phone]     NAME:  Maggie Lawson  YOB: 1931  MEDICAL RECORD NUMBER:  [id number]  DATE:  2024        Return Appointment:  [] Dressing Supply Provider:   [] Home Healthcare:   [] Return Appointment: Week(s)  [] Nurse Visit:      [x] Discharge from [facility]: [] Healed        [] Refer to Provider:         [x] Consult     Follow-up Information:  [] Ordered Tests:   [x] Referrals: Dr. Crane- keep follow up appointment  [] Rx:   [] Other:         Wound Cleansing:   Do not scrub or use excessive force.  Cleanse wound prior to applying a clean dressing with:  [] Normal Saline          [] Keep Wound Dry in Shower     [] Wound Cleanser   [] Cleanse with Mild Soap & Water    [] Other:        Topical Treatments:  Do not apply lotions, creams, or ointments to wound bed unless directed.   [x] Apply moisturizing lotion to skin surrounding the wound prior to dressing change.  [] Apply antifungal ointment to skin surrounding the wound prior to dressing change.  [] Apply thin film of moisture barrier ointment to skin immediately around wound.  [] Apply Betadine to skin immediately around wound   [] Other:                 Edema Control:  Apply:  [x] Compression Stockin-30 mmHg       [x]Right Leg     [x]Left Leg              [] Tubigrip      []Right Leg Double Layer      []Left Leg Double Layer                                      []Right Leg Single Layer       []Left Leg Single Layer                            [x] Elevate leg(s) above the level of the heart when sitting.                 [x] Avoid prolonged standing in one place.         Dietary:  [x] Diet as tolerated:    [] Calorie

## 2024-01-23 NOTE — WOUND CARE
Wound Clinic Physician Orders and Discharge Instructions  [facility]  [address], [address]  [place] [address]  Telephone: [phone]     FAX [phone]    NAME:  Maggie Lawson  YOB: 1931  MEDICAL RECORD NUMBER:  [id number]  DATE:  2024      Return Appointment:  [] Dressing Supply Provider:   [] Home Healthcare:   [] Return Appointment: Week(s)  [] Nurse Visit:     [x] Discharge from [facility]: [] Healed        [] Refer to Provider:         [x] Consult    Follow-up Information:  [] Ordered Tests:   [x] Referrals: Dr. Crane- keep follow up appointment  [] Rx:   [] Other:       Wound Cleansing:   Do not scrub or use excessive force.  Cleanse wound prior to applying a clean dressing with:  [] Normal Saline   [] Keep Wound Dry in Shower     [] Wound Cleanser   [] Cleanse with Mild Soap & Water    [] Other:       Topical Treatments:  Do not apply lotions, creams, or ointments to wound bed unless directed.   [x] Apply moisturizing lotion to skin surrounding the wound prior to dressing change.  [] Apply antifungal ointment to skin surrounding the wound prior to dressing change.  [] Apply thin film of moisture barrier ointment to skin immediately around wound.  [] Apply Betadine to skin immediately around wound   [] Other:      Edema Control:  Apply: [x] Compression Stockin-30 mmHg  [x]Right Leg [x]Left Leg   [] Tubigrip []Right Leg Double Layer []Left Leg Double Layer      []Right Leg Single Layer []Left Leg Single Layer      [x] Elevate leg(s) above the level of the heart when sitting.    [x] Avoid prolonged standing in one place.     Dietary:  [x] Diet as tolerated: [] Calorie Diabetic Diet: [] No Added Salt:  [x] Increase Protein: [] Other:     Activity:  [x] Activity as tolerated:    [] Patient has no activity restrictions      [] Strict Bedrest   [] Remain off Work      [] May return to full duty work                                     [] Return

## 2024-01-25 NOTE — CONSULTS
[facility]   History and Physical Note   Referring Provider: Julisa Sandhu MD  Reason for Referral: left lower leg wound management    Maggie Lawson  MEDICAL RECORD NUMBER:  [id number]  AGE: 89 y.o.   GENDER: female  : 1931  EPISODE DATE:  2024    Chief complaint and reason for visit:     Chief Complaint   Patient presents with left lower leg wound managment    Wound Check     consult         HISTORY of PRESENT ILLNESS HPI     Maggie Lawson is a 89 y.o. female who presents today for an initial evaluation of a wound/ulcer. Patient is new to me but not to the wound center . Wound duration: unknown . Comes with her     History of Wound Context: Ms Maggie Lawson is a 89 yo patient who presented to the Wound care  in the past with an open wound in the left lower leg.  She was successfully treated by Dr Cunningham and released  back on 2023 with the wound healed.  Patient returns today with no complaints but wanted to make sure the wound was healed.  Denies any pain, drainage, redness or fever.  She has been found to have vascular disease, especially in the left lower leg and right great toe with worst pressure readings.  She is followed by Dr Crane .     Pertinent associated symptoms:  none    PAST MEDICAL HISTORY        Diagnosis Date    High cholesterol     High cholesterol     HTN (hypertension)     Hypertension     Hypothyroidism     Hypothyroidism        PAST SURGICAL HISTORY  Past Surgical History:   Procedure Laterality Date    BREAST LUMPECTOMY      HYSTERECTOMY (CERVIX STATUS UNKNOWN)         FAMILY HISTORY  Family History   Problem Relation Age of Onset    Heart Disease Mother     Cancer Father        SOCIAL HISTORY  Social History     Tobacco Use    Smoking status: Former    Smokeless tobacco: Never   Vaping Use    Vaping Use: Never used   Substance Use Topics    Alcohol use: Not Currently    Drug use: Never       ALLERGIES  No Known

## 2024-09-26 ENCOUNTER — APPOINTMENT (OUTPATIENT)
Facility: HOSPITAL | Age: 89
End: 2024-09-26
Payer: MEDICARE

## 2024-09-26 ENCOUNTER — HOSPITAL ENCOUNTER (EMERGENCY)
Facility: HOSPITAL | Age: 89
Discharge: HOME | End: 2024-09-26
Attending: EMERGENCY MEDICINE
Payer: MEDICARE

## 2024-09-26 VITALS
RESPIRATION RATE: 17 BRPM | SYSTOLIC BLOOD PRESSURE: 143 MMHG | TEMPERATURE: 98 F | WEIGHT: 114 LBS | OXYGEN SATURATION: 99 % | HEART RATE: 99 BPM | DIASTOLIC BLOOD PRESSURE: 81 MMHG | BODY MASS INDEX: 20.98 KG/M2 | HEIGHT: 62 IN

## 2024-09-26 DIAGNOSIS — N39.0 URINARY TRACT INFECTION WITHOUT HEMATURIA, SITE UNSPECIFIED: ICD-10-CM
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
## 2024-09-26 LAB
ALBUMIN SERPL-MCNC: 3.8 G/DL (ref 3.5–5)
ALBUMIN/GLOB SERPL: 1.3 (ref 1.1–2.2)
ALP SERPL-CCNC: 114 U/L (ref 45–117)
ALT SERPL-CCNC: 33 U/L (ref 12–78)
ANION GAP SERPL CALC-SCNC: 7 MMOL/L (ref 2–12)
APPEARANCE UR: ABNORMAL
AST SERPL W P-5'-P-CCNC: 45 U/L (ref 15–37)
BACTERIA URNS QL MICRO: ABNORMAL /HPF
BASOPHILS # BLD: 0 K/UL (ref 0–0.1)
BASOPHILS NFR BLD: 0 % (ref 0–1)
BILIRUB SERPL-MCNC: 1 MG/DL (ref 0.2–1)
BILIRUB UR QL: NEGATIVE
BNP SERPL-MCNC: 223 PG/ML
BUN SERPL-MCNC: 18 MG/DL (ref 6–20)
BUN/CREAT SERPL: 20 (ref 12–20)
CA-I BLD-MCNC: 9.5 MG/DL (ref 8.5–10.1)
CHLORIDE SERPL-SCNC: 103 MMOL/L (ref 97–108)
CO2 SERPL-SCNC: 29 MMOL/L (ref 21–32)
COLOR UR: YELLOW
CREAT SERPL-MCNC: 0.88 MG/DL (ref 0.55–1.02)
DIFFERENTIAL METHOD BLD: ABNORMAL
EOSINOPHIL # BLD: 0.1 K/UL (ref 0–0.4)
EOSINOPHIL NFR BLD: 1 % (ref 0–7)
EPITH CASTS URNS QL MICRO: ABNORMAL /LPF
ERYTHROCYTE [DISTWIDTH] IN BLOOD BY AUTOMATED COUNT: 13.5 % (ref 11.5–14.5)
GLOBULIN SER CALC-MCNC: 3 G/DL (ref 2–4)
GLUCOSE SERPL-MCNC: 87 MG/DL (ref 65–100)
GLUCOSE UR STRIP.AUTO-MCNC: NEGATIVE MG/DL
HCT VFR BLD AUTO: 43.4 % (ref 35–47)
HGB BLD-MCNC: 14.4 G/DL (ref 11.5–16)
HGB UR QL STRIP: ABNORMAL
IMM GRANULOCYTES # BLD AUTO: 0 K/UL (ref 0–0.04)
IMM GRANULOCYTES NFR BLD AUTO: 0 % (ref 0–0.5)
KETONES UR QL STRIP.AUTO: 50 MG/DL
LEUKOCYTE ESTERASE UR QL STRIP.AUTO: ABNORMAL
LYMPHOCYTES # BLD: 1.2 K/UL (ref 0.8–3.5)
LYMPHOCYTES NFR BLD: 14 % (ref 12–49)
MCH RBC QN AUTO: 30.4 PG (ref 26–34)
MCHC RBC AUTO-ENTMCNC: 33.2 G/DL (ref 30–36.5)
MCV RBC AUTO: 91.6 FL (ref 80–99)
MONOCYTES # BLD: 0.7 K/UL (ref 0–1)
MONOCYTES NFR BLD: 8 % (ref 5–13)
NEUTS SEG # BLD: 6.8 K/UL (ref 1.8–8)
NEUTS SEG NFR BLD: 77 % (ref 32–75)
NITRITE UR QL STRIP.AUTO: POSITIVE
PH UR STRIP: 5 (ref 5–8)
PLATELET # BLD AUTO: 229 K/UL (ref 150–400)
PMV BLD AUTO: 9.9 FL (ref 8.9–12.9)
POTASSIUM SERPL-SCNC: 4 MMOL/L (ref 3.5–5.1)
PROT SERPL-MCNC: 6.8 G/DL (ref 6.4–8.2)
PROT UR STRIP-MCNC: 30 MG/DL
RBC # BLD AUTO: 4.74 M/UL (ref 3.8–5.2)
RBC #/AREA URNS HPF: ABNORMAL /HPF (ref 0–5)
SODIUM SERPL-SCNC: 139 MMOL/L (ref 136–145)
SP GR UR REFRACTOMETRY: 1.02 (ref 1–1.03)
TROPONIN I SERPL HS-MCNC: 15 NG/L (ref 0–51)
URINE CULTURE IF INDICATED: ABNORMAL
UROBILINOGEN UR QL STRIP.AUTO: 0.1 EU/DL (ref 0.2–1)
WBC # BLD AUTO: 8.8 K/UL (ref 3.6–11)
WBC URNS QL MICRO: ABNORMAL /HPF (ref 0–4)

## 2024-09-26 PROCEDURE — 87088 URINE BACTERIA CULTURE: CPT

## 2024-09-26 PROCEDURE — 99285 EMERGENCY DEPT VISIT HI MDM: CPT

## 2024-09-26 PROCEDURE — 81001 URINALYSIS AUTO W/SCOPE: CPT

## 2024-09-26 PROCEDURE — 84484 ASSAY OF TROPONIN QUANT: CPT

## 2024-09-26 PROCEDURE — 87186 SC STD MICRODIL/AGAR DIL: CPT

## 2024-09-26 PROCEDURE — 83880 ASSAY OF NATRIURETIC PEPTIDE: CPT

## 2024-09-26 PROCEDURE — 36415 COLL VENOUS BLD VENIPUNCTURE: CPT

## 2024-09-26 PROCEDURE — 93005 ELECTROCARDIOGRAM TRACING: CPT | Performed by: EMERGENCY MEDICINE

## 2024-09-26 PROCEDURE — 70450 CT HEAD/BRAIN W/O DYE: CPT

## 2024-09-26 PROCEDURE — 87086 URINE CULTURE/COLONY COUNT: CPT

## 2024-09-26 PROCEDURE — 71045 X-RAY EXAM CHEST 1 VIEW: CPT

## 2024-09-26 PROCEDURE — 80053 COMPREHEN METABOLIC PANEL: CPT

## 2024-09-26 PROCEDURE — 2580000003 HC RX 258: Performed by: EMERGENCY MEDICINE

## 2024-09-26 PROCEDURE — 6370000000 HC RX 637 (ALT 250 FOR IP): Performed by: EMERGENCY MEDICINE

## 2024-09-26 PROCEDURE — 85025 COMPLETE CBC W/AUTO DIFF WBC: CPT

## 2024-09-26 RX ORDER — CEPHALEXIN 500 MG/1
500 CAPSULE ORAL 3 TIMES DAILY
Qty: 21 CAPSULE | Refills: 0 | Status: SHIPPED | OUTPATIENT
Start: 2024-09-26 | End: 2024-10-03

## 2024-09-26 RX ORDER — 0.9 % SODIUM CHLORIDE 0.9 %
1000 INTRAVENOUS SOLUTION INTRAVENOUS ONCE
Status: COMPLETED | OUTPATIENT
Start: 2024-09-26 | End: 2024-09-26

## 2024-09-26 RX ORDER — CEPHALEXIN 500 MG/1
500 CAPSULE ORAL
Status: COMPLETED | OUTPATIENT
Start: 2024-09-26 | End: 2024-09-26

## 2024-09-26 RX ADMIN — SODIUM CHLORIDE 1000 ML: 9 INJECTION, SOLUTION INTRAVENOUS at 17:43

## 2024-09-26 RX ADMIN — CEPHALEXIN 500 MG: 500 CAPSULE ORAL at 20:39

## 2024-09-26 ASSESSMENT — LIFESTYLE VARIABLES
HOW OFTEN DO YOU HAVE A DRINK CONTAINING ALCOHOL: NEVER
HOW MANY STANDARD DRINKS CONTAINING ALCOHOL DO YOU HAVE ON A TYPICAL DAY: PATIENT DOES NOT DRINK

## 2024-09-26 ASSESSMENT — PAIN SCALES - GENERAL
PAINLEVEL_OUTOF10: 0
PAINLEVEL_OUTOF10: 0

## 2024-09-26 ASSESSMENT — PAIN - FUNCTIONAL ASSESSMENT
PAIN_FUNCTIONAL_ASSESSMENT: 0-10
PAIN_FUNCTIONAL_ASSESSMENT: NONE - DENIES PAIN

## 2024-09-26 NOTE — ED PROVIDER NOTES
Care doctor    Call in 1 day        DISCHARGE MEDICATIONS:     Medication List        START taking these medications      cephALEXin 500 MG capsule  Commonly known as: KEFLEX  Take 1 capsule by mouth 3 times daily for 7 days            ASK your doctor about these medications      ACETAMINOPHEN-CODEINE #3 300-30 MG PO TABS (STARTER PACK)     amLODIPine 5 MG tablet  Commonly known as: NORVASC     aspirin 81 MG chewable tablet     atorvastatin 20 MG tablet  Commonly known as: LIPITOR     cilostazol 100 MG tablet  Commonly known as: PLETAL     cyanocobalamin 1000 MCG tablet     gemfibrozil 600 MG tablet  Commonly known as: LOPID     latanoprost 0.005 % ophthalmic solution  Commonly known as: XALATAN     levothyroxine 137 MCG tablet  Commonly known as: SYNTHROID     metoprolol tartrate 25 MG tablet  Commonly known as: LOPRESSOR     polyethylene glycol 17 GM/SCOOP powder  Commonly known as: GLYCOLAX     ramipril 1.25 MG capsule  Commonly known as: ALTACE     timolol 0.5 % ophthalmic solution  Commonly known as: TIMOPTIC     vitamin D 50 MCG (2000 UT) Caps capsule  Commonly known as: CHOLECALCIFEROL               Where to Get Your Medications        These medications were sent to [address] [phone] -  [phone]  [address] [place] [address]      Phone: [phone]   cephALEXin 500 MG capsule         DISCONTINUED MEDICATIONS:  Discharge Medication List as of 9/26/2024  9:48 PM          ED FINAL IMPRESSION     1. Ground-level fall    2. Urinary tract infection without hematuria, site unspecified         I am the primary provider of record. Rashid Canales DO (electronically signed)    (Please note that parts of this dictation were completed with voice recognition software. Quite often unanticipated grammatical, syntax, homophones, and other interpretive errors are inadvertently transcribed by the computer software. Please disregards these errors.

## 2024-09-26 NOTE — ED TRIAGE NOTES
Patient brought in by a neighbor. Patient lives alone and has had several falls recently.     Last fall was last night.  Neighbor arrived and assisted patient off the floor.     Patient denies any pain from the falls.     No blood thinners. Denies hitting head.

## 2024-09-27 LAB
EKG ATRIAL RATE: 258 BPM
EKG DIAGNOSIS: NORMAL
EKG Q-T INTERVAL: 350 MS
EKG QRS DURATION: 72 MS
EKG QTC CALCULATION (BAZETT): 413 MS
EKG R AXIS: 47 DEGREES
EKG T AXIS: 70 DEGREES
EKG VENTRICULAR RATE: 84 BPM

## 2024-10-01 LAB
BACTERIA SPEC CULT: ABNORMAL
BACTERIA SPEC CULT: ABNORMAL
COLONY COUNT, CNT: ABNORMAL
Lab: ABNORMAL

## 2024-10-03 RX ORDER — CEFDINIR 300 MG/1
300 CAPSULE ORAL 2 TIMES DAILY
Qty: 14 CAPSULE | Refills: 0 | Status: SHIPPED | OUTPATIENT
Start: 2024-10-03 | End: 2024-10-10

## 2024-10-04 ENCOUNTER — APPOINTMENT (OUTPATIENT)
Facility: HOSPITAL | Age: 89
End: 2024-10-04
Payer: MEDICARE

## 2024-10-04 ENCOUNTER — HOSPITAL ENCOUNTER (EMERGENCY)
Facility: HOSPITAL | Age: 89
Discharge: ANOTHER HOSPITAL | End: 2024-10-04
Attending: EMERGENCY MEDICINE
Payer: MEDICARE

## 2024-10-04 VITALS
SYSTOLIC BLOOD PRESSURE: 143 MMHG | RESPIRATION RATE: 18 BRPM | BODY MASS INDEX: 20.98 KG/M2 | HEART RATE: 71 BPM | WEIGHT: 114 LBS | HEIGHT: 62 IN | OXYGEN SATURATION: 96 % | TEMPERATURE: 97.5 F | DIASTOLIC BLOOD PRESSURE: 81 MMHG

## 2024-10-04 DIAGNOSIS — I82.812 SUPERFICIAL THROMBOSIS OF LEG, LEFT: ICD-10-CM

## 2024-10-04 DIAGNOSIS — I60.9 SUBARACHNOID HEMORRHAGE (HCC): Primary | ICD-10-CM

## 2024-10-04 DIAGNOSIS — R29.6 MULTIPLE FALLS: ICD-10-CM

## 2024-10-04 LAB
ALBUMIN SERPL-MCNC: 3.6 G/DL (ref 3.5–5)
ALBUMIN/GLOB SERPL: 1.2 (ref 1.1–2.2)
ALP SERPL-CCNC: 124 U/L (ref 45–117)
ALT SERPL-CCNC: 27 U/L (ref 12–78)
ANION GAP SERPL CALC-SCNC: 5 MMOL/L (ref 2–12)
APPEARANCE UR: CLEAR
AST SERPL W P-5'-P-CCNC: 34 U/L (ref 15–37)
BACTERIA URNS QL MICRO: NEGATIVE /HPF
BASOPHILS # BLD: 0 K/UL (ref 0–0.1)
BASOPHILS NFR BLD: 1 % (ref 0–1)
BILIRUB SERPL-MCNC: 0.3 MG/DL (ref 0.2–1)
BILIRUB UR QL: NEGATIVE
BUN SERPL-MCNC: 12 MG/DL (ref 6–20)
BUN/CREAT SERPL: 15 (ref 12–20)
CA-I BLD-MCNC: 9.4 MG/DL (ref 8.5–10.1)
CHLORIDE SERPL-SCNC: 108 MMOL/L (ref 97–108)
CO2 SERPL-SCNC: 28 MMOL/L (ref 21–32)
COLOR UR: NORMAL
CREAT SERPL-MCNC: 0.81 MG/DL (ref 0.55–1.02)
DIFFERENTIAL METHOD BLD: ABNORMAL
ECHO BSA: 1.5 M2
EOSINOPHIL # BLD: 0.1 K/UL (ref 0–0.4)
EOSINOPHIL NFR BLD: 2 % (ref 0–7)
EPITH CASTS URNS QL MICRO: NORMAL /LPF
ERYTHROCYTE [DISTWIDTH] IN BLOOD BY AUTOMATED COUNT: 14 % (ref 11.5–14.5)
GLOBULIN SER CALC-MCNC: 3.1 G/DL (ref 2–4)
GLUCOSE SERPL-MCNC: 93 MG/DL (ref 65–100)
GLUCOSE UR STRIP.AUTO-MCNC: NEGATIVE MG/DL
HCT VFR BLD AUTO: 42 % (ref 35–47)
HGB BLD-MCNC: 13.7 G/DL (ref 11.5–16)
HGB UR QL STRIP: NEGATIVE
IMM GRANULOCYTES # BLD AUTO: 0 K/UL (ref 0–0.04)
IMM GRANULOCYTES NFR BLD AUTO: 1 % (ref 0–0.5)
INR PPP: 0.9 (ref 0.9–1.1)
KETONES UR QL STRIP.AUTO: NEGATIVE MG/DL
LEUKOCYTE ESTERASE UR QL STRIP.AUTO: NEGATIVE
LYMPHOCYTES # BLD: 1.6 K/UL (ref 0.8–3.5)
LYMPHOCYTES NFR BLD: 21 % (ref 12–49)
MCH RBC QN AUTO: 30.4 PG (ref 26–34)
MCHC RBC AUTO-ENTMCNC: 32.6 G/DL (ref 30–36.5)
MCV RBC AUTO: 93.1 FL (ref 80–99)
MONOCYTES # BLD: 0.5 K/UL (ref 0–1)
MONOCYTES NFR BLD: 6 % (ref 5–13)
MUCOUS THREADS URNS QL MICRO: NORMAL /LPF
NEUTS SEG # BLD: 5.4 K/UL (ref 1.8–8)
NEUTS SEG NFR BLD: 69 % (ref 32–75)
NITRITE UR QL STRIP.AUTO: NEGATIVE
NRBC # BLD: 0 K/UL (ref 0–0.01)
NRBC BLD-RTO: 0 PER 100 WBC
PH UR STRIP: 6 (ref 5–8)
PLATELET # BLD AUTO: 283 K/UL (ref 150–400)
PMV BLD AUTO: 9.5 FL (ref 8.9–12.9)
POTASSIUM SERPL-SCNC: 4.6 MMOL/L (ref 3.5–5.1)
PROT SERPL-MCNC: 6.7 G/DL (ref 6.4–8.2)
PROT UR STRIP-MCNC: NEGATIVE MG/DL
PROTHROMBIN TIME: 12.9 SEC (ref 11.9–14.6)
RBC # BLD AUTO: 4.51 M/UL (ref 3.8–5.2)
RBC #/AREA URNS HPF: NORMAL /HPF (ref 0–5)
SODIUM SERPL-SCNC: 141 MMOL/L (ref 136–145)
SP GR UR REFRACTOMETRY: 1.01 (ref 1–1.03)
TSH SERPL DL<=0.05 MIU/L-ACNC: 83.1 UIU/ML (ref 0.36–3.74)
URINE CULTURE IF INDICATED: NORMAL
UROBILINOGEN UR QL STRIP.AUTO: 0.1 EU/DL (ref 0.1–1)
WBC # BLD AUTO: 7.6 K/UL (ref 3.6–11)
WBC URNS QL MICRO: NORMAL /HPF (ref 0–4)

## 2024-10-04 PROCEDURE — 72125 CT NECK SPINE W/O DYE: CPT

## 2024-10-04 PROCEDURE — 80053 COMPREHEN METABOLIC PANEL: CPT

## 2024-10-04 PROCEDURE — 70450 CT HEAD/BRAIN W/O DYE: CPT

## 2024-10-04 PROCEDURE — 85025 COMPLETE CBC W/AUTO DIFF WBC: CPT

## 2024-10-04 PROCEDURE — 99285 EMERGENCY DEPT VISIT HI MDM: CPT

## 2024-10-04 PROCEDURE — 93971 EXTREMITY STUDY: CPT

## 2024-10-04 PROCEDURE — 84443 ASSAY THYROID STIM HORMONE: CPT

## 2024-10-04 PROCEDURE — 36415 COLL VENOUS BLD VENIPUNCTURE: CPT

## 2024-10-04 PROCEDURE — 81001 URINALYSIS AUTO W/SCOPE: CPT

## 2024-10-04 PROCEDURE — 85610 PROTHROMBIN TIME: CPT

## 2024-10-04 ASSESSMENT — PAIN - FUNCTIONAL ASSESSMENT: PAIN_FUNCTIONAL_ASSESSMENT: NONE - DENIES PAIN

## 2024-10-04 NOTE — ED PROVIDER NOTES
[facility] EMERGENCY DEPT  EMERGENCY DEPARTMENT HISTORY AND PHYSICAL EXAM      Date: 10/4/2024  Patient Name: Maggie Lawson  MRN: [id number]  YOB: 1931  Date of evaluation: 10/4/2024  Provider: JAX Orta NP   Note Started: 2:51 PM EDT 10/4/24    HISTORY OF PRESENT ILLNESS     Chief Complaint   Patient presents with    Leg Swelling    Leg Pain       History Provided By: Patient    HPI: Maggie Lawson is a 89 y.o. female past medical history reviewed as listed below presents with her \"advocate\" for concern about left lower leg swelling and her frequent falls.  States that she has had about 6 falls over the last several weeks and on Wednesday she fell twice on the stairs but were unwitnessed and unable to provide much details and meters the patient as she has some underlying confusion normally and she is acting at her baseline per her advocate.  She went to her PCP on Thursday due to these concerns and for assistance filling out paperwork to go into the [facility] as she allegedly has black mold in her house and lives alone and they were concerned for her multiple falls.  The PCP ordered a duplex that was scheduled for Monday however they did not want a wait that long for concern that she had a blood clot but has no history of clots only multiple vein stripping procedures.  She has a small area of dried blood in her hair but no contusion or obvious signs of injury and the patient denies any pain any headache any visual difficulty, neck pain, abdominal pain, extremity pain, hip pain, back pain.  She also reports that the patient's PCP took her off all of her medications and they have appointment on Tuesday of next week to discuss all of these things then but they were concerned about her leg which is why she came today.    PAST MEDICAL HISTORY   Past Medical History:  Past Medical History:   Diagnosis Date    High cholesterol     High cholesterol     HTN (hypertension)     Hypertension      available to the patient.  JAX Orta NP    ED IMPRESSION     1. Subarachnoid hemorrhage (HCC)    2. Superficial thrombosis of leg, left    3. Multiple falls          DISPOSITION/PLAN   DISPOSITION Decision To Transfer 10/04/2024 05:51:04 PM  Condition at Disposition: Data Unavailable    Transfer: The patient is being transferred to [facility]. The results of their tests and reasons for their transfer have been discussed with the patient and/or available family. The patient/family has conveyed agreement and understanding for the need to be transferred and for their diagnosis. Consultation has been made with Dr. Ralph Radford, who agrees to accept the transfer.      I am the Primary Clinician of Record: JAX Orta NP (electronically signed)    (Please note that parts of this dictation were completed with voice recognition software. Quite often unanticipated grammatical, syntax, homophones, and other interpretive errors are inadvertently transcribed by the computer software. Please disregards these errors. Please excuse any errors that have escaped final proofreading.)     Nedya Landa APRN - NP  10/04/24 1447
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
## 2024-10-04 NOTE — ED TRIAGE NOTES
Pt arrives with a c/o L lower leg swelling, pain, and redness x1 week. Pt scheduled for US Monday.

## 2024-11-17 ENCOUNTER — HOSPITAL ENCOUNTER (EMERGENCY)
Facility: HOSPITAL | Age: 89
Discharge: HOME | End: 2024-11-18
Attending: STUDENT IN AN ORGANIZED HEALTH CARE EDUCATION/TRAINING PROGRAM
Payer: MEDICARE

## 2024-11-17 ENCOUNTER — APPOINTMENT (OUTPATIENT)
Facility: HOSPITAL | Age: 89
End: 2024-11-17
Payer: MEDICARE

## 2024-11-17 DIAGNOSIS — F91.9 BEHAVIOR DISTURBANCE: Primary | ICD-10-CM

## 2024-11-17 PROCEDURE — 99284 EMERGENCY DEPT VISIT MOD MDM: CPT

## 2024-11-17 PROCEDURE — 70450 CT HEAD/BRAIN W/O DYE: CPT

## 2024-11-17 RX ORDER — QUETIAPINE FUMARATE 25 MG/1
25 TABLET, FILM COATED ORAL
Status: COMPLETED | OUTPATIENT
Start: 2024-11-17 | End: 2024-11-18

## 2024-11-17 ASSESSMENT — PAIN SCALES - GENERAL: PAINLEVEL_OUTOF10: 0

## 2024-11-17 ASSESSMENT — PAIN - FUNCTIONAL ASSESSMENT: PAIN_FUNCTIONAL_ASSESSMENT: 0-10

## 2024-11-18 VITALS
DIASTOLIC BLOOD PRESSURE: 74 MMHG | BODY MASS INDEX: 28.31 KG/M2 | RESPIRATION RATE: 14 BRPM | OXYGEN SATURATION: 98 % | SYSTOLIC BLOOD PRESSURE: 132 MMHG | TEMPERATURE: 98 F | HEART RATE: 68 BPM | WEIGHT: 159.8 LBS | HEIGHT: 63 IN

## 2024-11-18 LAB
ALBUMIN SERPL-MCNC: 3.6 G/DL (ref 3.5–5)
ALBUMIN/GLOB SERPL: 1.1 (ref 1.1–2.2)
ALP SERPL-CCNC: 151 U/L (ref 45–117)
ALT SERPL-CCNC: 39 U/L (ref 12–78)
ANION GAP SERPL CALC-SCNC: 5 MMOL/L (ref 2–12)
APPEARANCE UR: CLEAR
AST SERPL W P-5'-P-CCNC: 35 U/L (ref 15–37)
BACTERIA URNS QL MICRO: NEGATIVE /HPF
BASOPHILS # BLD: 0 K/UL (ref 0–0.1)
BASOPHILS NFR BLD: 1 % (ref 0–1)
BILIRUB SERPL-MCNC: 0.4 MG/DL (ref 0.2–1)
BILIRUB UR QL: NEGATIVE
BUN SERPL-MCNC: 22 MG/DL (ref 6–20)
BUN/CREAT SERPL: 30 (ref 12–20)
CA-I BLD-MCNC: 9.6 MG/DL (ref 8.5–10.1)
CHLORIDE SERPL-SCNC: 109 MMOL/L (ref 97–108)
CO2 SERPL-SCNC: 28 MMOL/L (ref 21–32)
COLOR UR: ABNORMAL
CREAT SERPL-MCNC: 0.74 MG/DL (ref 0.55–1.02)
DIFFERENTIAL METHOD BLD: NORMAL
EOSINOPHIL # BLD: 0.1 K/UL (ref 0–0.4)
EOSINOPHIL NFR BLD: 2 % (ref 0–7)
EPITH CASTS URNS QL MICRO: ABNORMAL /LPF
ERYTHROCYTE [DISTWIDTH] IN BLOOD BY AUTOMATED COUNT: 13.2 % (ref 11.5–14.5)
GLOBULIN SER CALC-MCNC: 3.4 G/DL (ref 2–4)
GLUCOSE SERPL-MCNC: 104 MG/DL (ref 65–100)
GLUCOSE UR STRIP.AUTO-MCNC: NEGATIVE MG/DL
HCT VFR BLD AUTO: 43.1 % (ref 35–47)
HGB BLD-MCNC: 14.2 G/DL (ref 11.5–16)
HGB UR QL STRIP: NEGATIVE
IMM GRANULOCYTES # BLD AUTO: 0 K/UL (ref 0–0.04)
IMM GRANULOCYTES NFR BLD AUTO: 0 % (ref 0–0.5)
KETONES UR QL STRIP.AUTO: 5 MG/DL
LEUKOCYTE ESTERASE UR QL STRIP.AUTO: ABNORMAL
LYMPHOCYTES # BLD: 1.5 K/UL (ref 0.8–3.5)
LYMPHOCYTES NFR BLD: 24 % (ref 12–49)
MCH RBC QN AUTO: 30.7 PG (ref 26–34)
MCHC RBC AUTO-ENTMCNC: 32.9 G/DL (ref 30–36.5)
MCV RBC AUTO: 93.1 FL (ref 80–99)
MONOCYTES # BLD: 0.6 K/UL (ref 0–1)
MONOCYTES NFR BLD: 10 % (ref 5–13)
MUCOUS THREADS URNS QL MICRO: ABNORMAL /LPF
NEUTS SEG # BLD: 4.2 K/UL (ref 1.8–8)
NEUTS SEG NFR BLD: 63 % (ref 32–75)
NITRITE UR QL STRIP.AUTO: NEGATIVE
NRBC # BLD: 0 K/UL (ref 0–0.01)
NRBC BLD-RTO: 0 PER 100 WBC
PH UR STRIP: 5 (ref 5–8)
PLATELET # BLD AUTO: 213 K/UL (ref 150–400)
PMV BLD AUTO: 10.2 FL (ref 8.9–12.9)
POTASSIUM SERPL-SCNC: 4.4 MMOL/L (ref 3.5–5.1)
PROT SERPL-MCNC: 7 G/DL (ref 6.4–8.2)
PROT UR STRIP-MCNC: NEGATIVE MG/DL
RBC # BLD AUTO: 4.63 M/UL (ref 3.8–5.2)
RBC #/AREA URNS HPF: ABNORMAL /HPF (ref 0–5)
SODIUM SERPL-SCNC: 142 MMOL/L (ref 136–145)
SP GR UR REFRACTOMETRY: 1.02 (ref 1–1.03)
URINE CULTURE IF INDICATED: ABNORMAL
UROBILINOGEN UR QL STRIP.AUTO: 0.1 EU/DL (ref 0.1–1)
WBC # BLD AUTO: 6.5 K/UL (ref 3.6–11)
WBC URNS QL MICRO: ABNORMAL /HPF (ref 0–4)

## 2024-11-18 PROCEDURE — 80053 COMPREHEN METABOLIC PANEL: CPT

## 2024-11-18 PROCEDURE — 6370000000 HC RX 637 (ALT 250 FOR IP): Performed by: STUDENT IN AN ORGANIZED HEALTH CARE EDUCATION/TRAINING PROGRAM

## 2024-11-18 PROCEDURE — 6360000002 HC RX W HCPCS: Performed by: STUDENT IN AN ORGANIZED HEALTH CARE EDUCATION/TRAINING PROGRAM

## 2024-11-18 PROCEDURE — 85025 COMPLETE CBC W/AUTO DIFF WBC: CPT

## 2024-11-18 PROCEDURE — 96372 THER/PROPH/DIAG INJ SC/IM: CPT

## 2024-11-18 PROCEDURE — 81001 URINALYSIS AUTO W/SCOPE: CPT

## 2024-11-18 RX ORDER — HALOPERIDOL 5 MG/ML
2 INJECTION INTRAMUSCULAR ONCE
Status: COMPLETED | OUTPATIENT
Start: 2024-11-18 | End: 2024-11-18

## 2024-11-18 RX ADMIN — HALOPERIDOL LACTATE 2 MG: 5 INJECTION, SOLUTION INTRAMUSCULAR at 02:23

## 2024-11-18 RX ADMIN — QUETIAPINE FUMARATE 25 MG: 25 TABLET ORAL at 00:30

## 2024-11-18 NOTE — ED PROVIDER NOTES
Threatened with loss of utilities: No       PHYSICAL EXAM   Physical Exam  HENT:      Head: Normocephalic and atraumatic.      Nose: Nose normal.      Mouth/Throat:      Mouth: Mucous membranes are moist.   Eyes:      Extraocular Movements: Extraocular movements intact.      Pupils: Pupils are equal, round, and reactive to light.   Cardiovascular:      Rate and Rhythm: Normal rate and regular rhythm.   Pulmonary:      Effort: Pulmonary effort is normal.      Breath sounds: Normal breath sounds.   Abdominal:      Palpations: Abdomen is soft.      Tenderness: There is no abdominal tenderness.   Musculoskeletal:         General: No deformity.   Skin:     General: Skin is warm and dry.   Neurological:      General: No focal deficit present.      Cranial Nerves: No cranial nerve deficit or facial asymmetry.      Sensory: No sensory deficit.      Motor: No weakness.      Comments: Oriented to self and place but not completely oriented to recent events           SCREENINGS     NIH Stroke Scale  NIH Stroke Scale Assessed: Yes  Interval: Baseline  Level of Consciousness (1a): Alert  LOC Questions (1b): Answers one correctly  LOC Commands (1c): Performs both tasks correctly  Best Gaze (2): Normal  Visual (3): No visual loss  Facial Palsy (4): Normal symmetrical movement  Motor Arm, Left (5a): No drift  Motor Arm, Right (5b): No drift  Motor Leg, Left (6a): No drift  Motor Leg, Right (6b): No drift  Limb Ataxia (7): Absent  Sensory (8): Normal  Best Language (9): No aphasia  Dysarthria (10): Normal  Extinction and Inattention (11): No abnormality  Total: 1             LAB, EKG AND DIAGNOSTIC RESULTS   Labs:  Recent Results (from the past 12 hour(s))   Urinalysis with Reflex to Culture    Collection Time: 11/18/24 12:21 AM    Specimen: Urine   Result Value Ref Range    Color, UA Yellow/Straw      Appearance Clear Clear      Specific Gravity, UA 1.017 1.003 - 1.030      pH, Urine 5.0 5.0 - 8.0      Protein, UA Negative Negative

## 2024-11-18 NOTE — ED NOTES
POA, Beverly Cleveland, called by this RN regarding patient status. Informed POA regarding encounter, discharge, and need for transport. Patient does not qualify for [facility] stretcher as she is ambulatory.  POA states she will not be able to provide transport until 0830am 11/18 due to not being able to drive at night.

## 2024-11-18 NOTE — ED TRIAGE NOTES
BIB EMS from [facility]. Per EMS patient has become more aggressive with staff for the last three days, and has become more confused. [facility] states that she had a urine screen 3 days ago and was negative for UTI

## 2024-11-18 NOTE — ED NOTES
Assumed care of patient from [facility]. Patient connected to continuous BP and sp02 monitoring. Alert and oriented to self only.

## 2024-11-18 NOTE — ED NOTES
[facility] called at this time. Able to provide transport in AM after 4am. This RN calling POA back with update.

## 2024-11-18 NOTE — DISCHARGE INSTRUCTIONS
Thank you for choosing our Emergency Department for your care.  It is our privilege to care for you in your time of need.  In the next several days, you may receive a survey via email or mailed to your home about your experience with our team.  We would greatly appreciate you taking a few minutes to complete the survey, as we use this information to learn what we have done well and what we could be doing better. Thank you for trusting us with your care!    Below you will find a list of your tests from today's visit.   Labs  Recent Results (from the past 12 hour(s))   Urinalysis with Reflex to Culture    Collection Time: 11/18/24 12:21 AM    Specimen: Urine   Result Value Ref Range    Color, UA Yellow/Straw      Appearance Clear Clear      Specific Gravity, UA 1.017 1.003 - 1.030      pH, Urine 5.0 5.0 - 8.0      Protein, UA Negative Negative mg/dL    Glucose, Ur Negative Negative mg/dL    Ketones, Urine 5 (A) Negative mg/dL    Bilirubin, Urine Negative Negative      Blood, Urine Negative Negative      Urobilinogen, Urine 0.1 0.1 - 1.0 EU/dL    Nitrite, Urine Negative Negative      Leukocyte Esterase, Urine Trace (A) Negative      WBC, UA 5-10 0 - 4 /hpf    RBC, UA 0-5 0 - 5 /hpf    Epithelial Cells, UA Few Few /lpf    BACTERIA, URINE Negative Negative /hpf    Urine Culture if Indicated Culture not indicated by UA result Culture not indicated by UA result      Mucus, UA Trace (A) Negative /lpf   CBC with Auto Differential    Collection Time: 11/18/24 12:21 AM   Result Value Ref Range    WBC 6.5 3.6 - 11.0 K/uL    RBC 4.63 3.80 - 5.20 M/uL    Hemoglobin 14.2 11.5 - 16.0 g/dL    Hematocrit 43.1 35.0 - 47.0 %    MCV 93.1 80.0 - 99.0 FL    MCH 30.7 26.0 - 34.0 PG    MCHC 32.9 30.0 - 36.5 g/dL    RDW 13.2 11.5 - 14.5 %    Platelets 213 150 - 400 K/uL    MPV 10.2 8.9 - 12.9 FL    Nucleated RBCs 0.0 0.0  WBC    nRBC 0.00 0.00 - 0.01 K/uL    Neutrophils % 63 32 - 75 %    Lymphocytes % 24 12 - 49 %    Monocytes %

## 2024-11-18 NOTE — ED NOTES
Patient experienced large bowel movement in brief. Full bed change performed and patient cleansed with bath wipes. New brief applied. Patient poorly tolerated procedure. Combative with staff. MD notified. See MAR for med admin.

## 2024-12-12 ENCOUNTER — APPOINTMENT (OUTPATIENT)
Facility: HOSPITAL | Age: 88
DRG: 871 | End: 2024-12-12
Payer: MEDICARE

## 2024-12-12 ENCOUNTER — HOSPITAL ENCOUNTER (INPATIENT)
Facility: HOSPITAL | Age: 88
LOS: 6 days | Discharge: NURSING, REHAB OR LONG-TERM CARE | DRG: 871 | End: 2024-12-18
Attending: STUDENT IN AN ORGANIZED HEALTH CARE EDUCATION/TRAINING PROGRAM | Admitting: HOSPITALIST
Payer: MEDICARE

## 2024-12-12 DIAGNOSIS — J18.9 PNEUMONIA OF RIGHT LOWER LOBE DUE TO INFECTIOUS ORGANISM: ICD-10-CM

## 2024-12-12 DIAGNOSIS — I48.91 ATRIAL FIBRILLATION WITH RVR (HCC): Primary | ICD-10-CM

## 2024-12-12 DIAGNOSIS — I48.0 PAROXYSMAL ATRIAL FIBRILLATION (HCC): ICD-10-CM

## 2024-12-12 DIAGNOSIS — I48.19 PERSISTENT ATRIAL FIBRILLATION (HCC): ICD-10-CM

## 2024-12-12 PROBLEM — J15.3 CAP (COMMUNITY ACQUIRED PNEUMONIA) DUE TO GROUP B STREPTOCOCCUS (HCC): Status: ACTIVE | Noted: 2024-12-12

## 2024-12-12 LAB
ALBUMIN SERPL-MCNC: 2.6 G/DL (ref 3.5–5)
ALBUMIN/GLOB SERPL: 0.9 (ref 1.1–2.2)
ALP SERPL-CCNC: 212 U/L (ref 45–117)
ALT SERPL-CCNC: 76 U/L (ref 12–78)
ANION GAP SERPL CALC-SCNC: 4 MMOL/L (ref 2–12)
AST SERPL W P-5'-P-CCNC: 70 U/L (ref 15–37)
BASOPHILS # BLD: 0 K/UL (ref 0–0.1)
BASOPHILS NFR BLD: 1 % (ref 0–1)
BILIRUB SERPL-MCNC: 0.5 MG/DL (ref 0.2–1)
BUN SERPL-MCNC: 12 MG/DL (ref 6–20)
BUN/CREAT SERPL: 16 (ref 12–20)
CA-I BLD-MCNC: 9.1 MG/DL (ref 8.5–10.1)
CHLORIDE SERPL-SCNC: 109 MMOL/L (ref 97–108)
CO2 SERPL-SCNC: 26 MMOL/L (ref 21–32)
CREAT SERPL-MCNC: 0.73 MG/DL (ref 0.55–1.02)
D DIMER PPP FEU-MCNC: <0.27 UG/ML(FEU)
DIFFERENTIAL METHOD BLD: NORMAL
EKG ATRIAL RATE: 153 BPM
EKG DIAGNOSIS: NORMAL
EKG Q-T INTERVAL: 296 MS
EKG QRS DURATION: 72 MS
EKG QTC CALCULATION (BAZETT): 461 MS
EKG R AXIS: 27 DEGREES
EKG T AXIS: 45 DEGREES
EKG VENTRICULAR RATE: 146 BPM
EOSINOPHIL # BLD: 0.2 K/UL (ref 0–0.4)
EOSINOPHIL NFR BLD: 2 % (ref 0–7)
ERYTHROCYTE [DISTWIDTH] IN BLOOD BY AUTOMATED COUNT: 12.9 % (ref 11.5–14.5)
GLOBULIN SER CALC-MCNC: 3 G/DL (ref 2–4)
GLUCOSE BLD STRIP.AUTO-MCNC: 84 MG/DL (ref 65–100)
GLUCOSE SERPL-MCNC: 93 MG/DL (ref 65–100)
HCT VFR BLD AUTO: 38.6 % (ref 35–47)
HGB BLD-MCNC: 12.7 G/DL (ref 11.5–16)
IMM GRANULOCYTES # BLD AUTO: 0 K/UL (ref 0–0.04)
IMM GRANULOCYTES NFR BLD AUTO: 0 % (ref 0–0.5)
LACTATE BLD-SCNC: 0.93 MMOL/L (ref 0.4–2)
LYMPHOCYTES # BLD: 1.5 K/UL (ref 0.8–3.5)
LYMPHOCYTES NFR BLD: 18 % (ref 12–49)
MCH RBC QN AUTO: 30.1 PG (ref 26–34)
MCHC RBC AUTO-ENTMCNC: 32.9 G/DL (ref 30–36.5)
MCV RBC AUTO: 91.5 FL (ref 80–99)
MONOCYTES # BLD: 0.7 K/UL (ref 0–1)
MONOCYTES NFR BLD: 8 % (ref 5–13)
NEUTS SEG # BLD: 6.1 K/UL (ref 1.8–8)
NEUTS SEG NFR BLD: 71 % (ref 32–75)
NRBC # BLD: 0 K/UL (ref 0–0.01)
NRBC BLD-RTO: 0 PER 100 WBC
PERFORMED BY:: NORMAL
PERFORMED BY:: NORMAL
PLATELET # BLD AUTO: 268 K/UL (ref 150–400)
PMV BLD AUTO: 10.7 FL (ref 8.9–12.9)
POTASSIUM SERPL-SCNC: 4.4 MMOL/L (ref 3.5–5.1)
PROCALCITONIN SERPL-MCNC: <0.05 NG/ML
PROT SERPL-MCNC: 5.6 G/DL (ref 6.4–8.2)
RBC # BLD AUTO: 4.22 M/UL (ref 3.8–5.2)
SODIUM SERPL-SCNC: 139 MMOL/L (ref 136–145)
TROPONIN I SERPL HS-MCNC: 10 NG/L (ref 0–51)
TSH SERPL DL<=0.05 MIU/L-ACNC: 0.29 UIU/ML (ref 0.36–3.74)
WBC # BLD AUTO: 8.5 K/UL (ref 3.6–11)

## 2024-12-12 PROCEDURE — 84484 ASSAY OF TROPONIN QUANT: CPT

## 2024-12-12 PROCEDURE — 96375 TX/PRO/DX INJ NEW DRUG ADDON: CPT

## 2024-12-12 PROCEDURE — 36415 COLL VENOUS BLD VENIPUNCTURE: CPT

## 2024-12-12 PROCEDURE — 99285 EMERGENCY DEPT VISIT HI MDM: CPT

## 2024-12-12 PROCEDURE — 2500000003 HC RX 250 WO HCPCS: Performed by: STUDENT IN AN ORGANIZED HEALTH CARE EDUCATION/TRAINING PROGRAM

## 2024-12-12 PROCEDURE — 82962 GLUCOSE BLOOD TEST: CPT

## 2024-12-12 PROCEDURE — 87040 BLOOD CULTURE FOR BACTERIA: CPT

## 2024-12-12 PROCEDURE — 80053 COMPREHEN METABOLIC PANEL: CPT

## 2024-12-12 PROCEDURE — 85025 COMPLETE CBC W/AUTO DIFF WBC: CPT

## 2024-12-12 PROCEDURE — 85379 FIBRIN DEGRADATION QUANT: CPT

## 2024-12-12 PROCEDURE — 84439 ASSAY OF FREE THYROXINE: CPT

## 2024-12-12 PROCEDURE — 6370000000 HC RX 637 (ALT 250 FOR IP): Performed by: STUDENT IN AN ORGANIZED HEALTH CARE EDUCATION/TRAINING PROGRAM

## 2024-12-12 PROCEDURE — 6370000000 HC RX 637 (ALT 250 FOR IP): Performed by: HOSPITALIST

## 2024-12-12 PROCEDURE — 96365 THER/PROPH/DIAG IV INF INIT: CPT

## 2024-12-12 PROCEDURE — 2580000003 HC RX 258: Performed by: HOSPITALIST

## 2024-12-12 PROCEDURE — 6360000002 HC RX W HCPCS: Performed by: HOSPITALIST

## 2024-12-12 PROCEDURE — 2060000000 HC ICU INTERMEDIATE R&B

## 2024-12-12 PROCEDURE — 2580000003 HC RX 258: Performed by: STUDENT IN AN ORGANIZED HEALTH CARE EDUCATION/TRAINING PROGRAM

## 2024-12-12 PROCEDURE — 84443 ASSAY THYROID STIM HORMONE: CPT

## 2024-12-12 PROCEDURE — 71045 X-RAY EXAM CHEST 1 VIEW: CPT

## 2024-12-12 PROCEDURE — 83605 ASSAY OF LACTIC ACID: CPT

## 2024-12-12 PROCEDURE — 96361 HYDRATE IV INFUSION ADD-ON: CPT

## 2024-12-12 PROCEDURE — 6360000002 HC RX W HCPCS: Performed by: STUDENT IN AN ORGANIZED HEALTH CARE EDUCATION/TRAINING PROGRAM

## 2024-12-12 PROCEDURE — 84145 PROCALCITONIN (PCT): CPT

## 2024-12-12 PROCEDURE — 93005 ELECTROCARDIOGRAM TRACING: CPT | Performed by: STUDENT IN AN ORGANIZED HEALTH CARE EDUCATION/TRAINING PROGRAM

## 2024-12-12 RX ORDER — ACETAMINOPHEN 650 MG/1
650 SUPPOSITORY RECTAL EVERY 6 HOURS PRN
Status: DISCONTINUED | OUTPATIENT
Start: 2024-12-12 | End: 2024-12-18 | Stop reason: HOSPADM

## 2024-12-12 RX ORDER — LANOLIN ALCOHOL/MO/W.PET/CERES
1000 CREAM (GRAM) TOPICAL DAILY
Status: DISCONTINUED | OUTPATIENT
Start: 2024-12-13 | End: 2024-12-18 | Stop reason: HOSPADM

## 2024-12-12 RX ORDER — POLYETHYLENE GLYCOL 3350 17 G/17G
17 POWDER, FOR SOLUTION ORAL DAILY PRN
Status: DISCONTINUED | OUTPATIENT
Start: 2024-12-12 | End: 2024-12-15 | Stop reason: SDUPTHER

## 2024-12-12 RX ORDER — SODIUM CHLORIDE 0.9 % (FLUSH) 0.9 %
5-40 SYRINGE (ML) INJECTION PRN
Status: DISCONTINUED | OUTPATIENT
Start: 2024-12-12 | End: 2024-12-18 | Stop reason: HOSPADM

## 2024-12-12 RX ORDER — SODIUM CHLORIDE 9 MG/ML
INJECTION, SOLUTION INTRAVENOUS PRN
Status: DISCONTINUED | OUTPATIENT
Start: 2024-12-12 | End: 2024-12-18 | Stop reason: HOSPADM

## 2024-12-12 RX ORDER — 0.9 % SODIUM CHLORIDE 0.9 %
500 INTRAVENOUS SOLUTION INTRAVENOUS ONCE
Status: COMPLETED | OUTPATIENT
Start: 2024-12-12 | End: 2024-12-12

## 2024-12-12 RX ORDER — ATORVASTATIN CALCIUM 20 MG/1
20 TABLET, FILM COATED ORAL DAILY
Status: DISCONTINUED | OUTPATIENT
Start: 2024-12-13 | End: 2024-12-15

## 2024-12-12 RX ORDER — ESCITALOPRAM OXALATE 10 MG/1
10 TABLET ORAL DAILY
COMMUNITY
Start: 2024-11-19

## 2024-12-12 RX ORDER — ONDANSETRON 4 MG/1
4 TABLET, ORALLY DISINTEGRATING ORAL EVERY 8 HOURS PRN
Status: DISCONTINUED | OUTPATIENT
Start: 2024-12-12 | End: 2024-12-14

## 2024-12-12 RX ORDER — ACETAMINOPHEN 325 MG/1
650 TABLET ORAL EVERY 6 HOURS PRN
Status: DISCONTINUED | OUTPATIENT
Start: 2024-12-12 | End: 2024-12-18 | Stop reason: HOSPADM

## 2024-12-12 RX ORDER — SODIUM CHLORIDE 0.9 % (FLUSH) 0.9 %
5-40 SYRINGE (ML) INJECTION EVERY 12 HOURS SCHEDULED
Status: DISCONTINUED | OUTPATIENT
Start: 2024-12-12 | End: 2024-12-18 | Stop reason: HOSPADM

## 2024-12-12 RX ORDER — ONDANSETRON 2 MG/ML
4 INJECTION INTRAMUSCULAR; INTRAVENOUS EVERY 6 HOURS PRN
Status: DISCONTINUED | OUTPATIENT
Start: 2024-12-12 | End: 2024-12-14

## 2024-12-12 RX ORDER — LISINOPRIL 2.5 MG/1
2.5 TABLET ORAL DAILY
COMMUNITY
Start: 2024-12-09

## 2024-12-12 RX ORDER — DILTIAZEM HYDROCHLORIDE 30 MG/1
30 TABLET, FILM COATED ORAL ONCE
Status: COMPLETED | OUTPATIENT
Start: 2024-12-12 | End: 2024-12-12

## 2024-12-12 RX ORDER — METOPROLOL TARTRATE 25 MG/1
25 TABLET, FILM COATED ORAL 2 TIMES DAILY
Status: DISCONTINUED | OUTPATIENT
Start: 2024-12-12 | End: 2024-12-13

## 2024-12-12 RX ORDER — DILTIAZEM HYDROCHLORIDE 5 MG/ML
0.25 INJECTION INTRAVENOUS ONCE
Status: COMPLETED | OUTPATIENT
Start: 2024-12-12 | End: 2024-12-12

## 2024-12-12 RX ORDER — CALCIUM GLUCONATE 20 MG/ML
1000 INJECTION, SOLUTION INTRAVENOUS ONCE
Status: COMPLETED | OUTPATIENT
Start: 2024-12-12 | End: 2024-12-12

## 2024-12-12 RX ORDER — QUETIAPINE FUMARATE 25 MG/1
12.5 TABLET, FILM COATED ORAL 2 TIMES DAILY
COMMUNITY
Start: 2024-12-07

## 2024-12-12 RX ORDER — ENOXAPARIN SODIUM 100 MG/ML
40 INJECTION SUBCUTANEOUS DAILY
Status: DISCONTINUED | OUTPATIENT
Start: 2024-12-13 | End: 2024-12-18 | Stop reason: HOSPADM

## 2024-12-12 RX ORDER — TIMOLOL MALEATE 5 MG/ML
1 SOLUTION/ DROPS OPHTHALMIC DAILY
Status: DISCONTINUED | OUTPATIENT
Start: 2024-12-13 | End: 2024-12-18 | Stop reason: HOSPADM

## 2024-12-12 RX ORDER — SODIUM CHLORIDE 9 MG/ML
INJECTION, SOLUTION INTRAVENOUS CONTINUOUS
Status: DISCONTINUED | OUTPATIENT
Start: 2024-12-12 | End: 2024-12-15

## 2024-12-12 RX ADMIN — METOPROLOL TARTRATE 25 MG: 25 TABLET, FILM COATED ORAL at 21:26

## 2024-12-12 RX ADMIN — SODIUM CHLORIDE: 9 INJECTION, SOLUTION INTRAVENOUS at 16:37

## 2024-12-12 RX ADMIN — AZITHROMYCIN MONOHYDRATE 500 MG: 500 INJECTION, POWDER, LYOPHILIZED, FOR SOLUTION INTRAVENOUS at 15:06

## 2024-12-12 RX ADMIN — SODIUM CHLORIDE 500 ML: 9 INJECTION, SOLUTION INTRAVENOUS at 06:46

## 2024-12-12 RX ADMIN — CALCIUM GLUCONATE 1000 MG: 20 INJECTION, SOLUTION INTRAVENOUS at 10:20

## 2024-12-12 RX ADMIN — METOPROLOL TARTRATE 25 MG: 25 TABLET, FILM COATED ORAL at 15:11

## 2024-12-12 RX ADMIN — DILTIAZEM HYDROCHLORIDE 13 MG: 5 INJECTION, SOLUTION INTRAVENOUS at 10:13

## 2024-12-12 RX ADMIN — SODIUM CHLORIDE, PRESERVATIVE FREE 10 ML: 5 INJECTION INTRAVENOUS at 21:28

## 2024-12-12 RX ADMIN — DOXYCYCLINE 100 MG: 100 INJECTION, POWDER, LYOPHILIZED, FOR SOLUTION INTRAVENOUS at 09:49

## 2024-12-12 RX ADMIN — DILTIAZEM HYDROCHLORIDE 30 MG: 30 TABLET, FILM COATED ORAL at 10:11

## 2024-12-12 RX ADMIN — CEFTRIAXONE SODIUM 1000 MG: 1 INJECTION, POWDER, FOR SOLUTION INTRAMUSCULAR; INTRAVENOUS at 09:42

## 2024-12-12 ASSESSMENT — PAIN - FUNCTIONAL ASSESSMENT: PAIN_FUNCTIONAL_ASSESSMENT: 0-10

## 2024-12-12 ASSESSMENT — PAIN SCALES - GENERAL
PAINLEVEL_OUTOF10: 6
PAINLEVEL_OUTOF10: 0
PAINLEVEL_OUTOF10: 0

## 2024-12-12 ASSESSMENT — PAIN DESCRIPTION - LOCATION: LOCATION: CHEST

## 2024-12-12 NOTE — H&P
HISTORY AND PHYSICAL  (Hospitalist, Internal Medicine)  IDENTIFYING INFORMATION   PATIENT:  Maggie Lawson  MRN:  [id number]  ADMIT DATE: 12/12/2024  TIME OF EVALUATION: 12/12/2024 12:04 PM    CHIEF COMPLAINT     Altered mental status    HISTORY OF PRESENT ILLNESS   Maggie Lawson is a 89 y.o. female presents with altered mental status.  This has been ongoing for several weeks but had gotten worse over the past 2 days with patient refusing her medications.  Staff was concerned with UTI but UA was negative.  She is a poor historian for which the history was obtained from the ED staff and chart.  She has no complaints.  Chest x-ray on presentation however shows right lower lobe lung infiltrates.  Patient initiated on IV antibiotics in the ER and being admitted to continue treatment.  Patient noted to be tachycardic on presentation and found to be in newly diagnosed A-fib.    PAST MEDICAL, SURGICAL, FAMILY, and SOCIAL HISTORY     Past Medical History:   Diagnosis Date    CAD (coronary artery disease)     High cholesterol     High cholesterol     HTN (hypertension)     Hypertension     Hypothyroidism     Hypothyroidism     PVD (peripheral vascular disease) (HCC)     Subarachnoid hemorrhage following injury     TIA (transient ischemic attack)     Venous thrombosis 10/04/2024    left thigh     Past Surgical History:   Procedure Laterality Date    BREAST LUMPECTOMY      HYSTERECTOMY (CERVIX STATUS UNKNOWN)       Family History   Problem Relation Age of Onset    Heart Disease Mother     Cancer Father      Family Hx of HTN  Family Hx as reviewed above, otherwise non-contributory  Social History     Socioeconomic History    Marital status: Single     Spouse name: None    Number of children: None    Years of education: None    Highest education level: None   Tobacco Use    Smoking status: Former    Smokeless tobacco: Never   Vaping Use    Vaping status: Never Used   Substance and Sexual Activity    Alcohol use: Yes    Drug use:

## 2024-12-12 NOTE — CARE COORDINATION
12/12/24 1423   Service Assessment   Patient Orientation Alert and Oriented;Person   Cognition Dementia / Early Alzheimer's   History Provided By Patient;Friend   Primary Caregiver Other (Comment)  (Facility)   Accompanied By/Relationship Friend/TACOS Banegas   Support Systems Friends/Neighbors   Patient's Healthcare Decision Maker is: Legal Next of Kin   PCP Verified by CM Yes  (Facility MD)   Last Visit to PCP Within last 3 months   Prior Functional Level Assistance with the following:;Bathing;Dressing;Toileting;Cooking;Housework;Shopping;Mobility  (Cane)   Current Functional Level Assistance with the following:;Bathing;Dressing;Toileting;Cooking;Housework;Shopping;Mobility  (Cane)   Can patient return to prior living arrangement Yes   Ability to make needs known: Fair   Family able to assist with home care needs: Other (comment)  (Facility)   Would you like for me to discuss the discharge plan with any other family members/significant others, and if so, who? Yes  (Friend/TACOS Banegas)   Financial Resources Medicare   Community Resources None   CM/SW Referral Other (see comment)  (None)   Social/Functional History   Lives With Other (comment)   Type of Home Facility  (Friend/TACOS Banegas signed Choice Letter to return to [facility]/Referral.)   Home Layout One level   Home Access Level entry   Bathroom Shower/Tub Walk-in shower   Bathroom Toilet Handicap height   Bathroom Equipment Grab bars in shower;Grab bars around toilet   Bathroom Accessibility Wheelchair accessible   Home Equipment Cane   Receives Help From Friend(s);Other (comment)  (Staff)   Prior Level of Assist for ADLs Needs assistance   Toileting Needs assistance   Prior Level of Assist for Homemaking Needs assistance   Homemaking Responsibilities Yes   Ambulation Assistance Needs assistance  (Cane)   Prior Level of Assist for Transfers Needs assistance   Active  No   Occupation Retired   Discharge Planning

## 2024-12-12 NOTE — ED NOTES
ED TO INPATIENT SBAR HANDOFF    Patient Name: Maggie Lawson   Preferred Name: Maggie  : 1931  89 y.o.   Family/Caregiver Present: no   Code Status Order: Full Code  PO Status: NPO:No  Telemetry Order:   C-SSRS: Risk of Suicide: No Risk  Sitter no  none  Restraints:     Sepsis Risk Score      Situation  Chief Complaint   Patient presents with    Irregular Heart Beat     Brief Description of Patient's Condition: Pt alert and oriented at intervals. Cond. stable  Mental Status: alert  Arrived from: [facility]  Imaging:   XR CHEST 1 VIEW   Final Result   Right lower lobe infiltrate.      Electronically signed by SUNDAR JACKSON        Abnormal labs:   Abnormal Labs Reviewed   COMPREHENSIVE METABOLIC PANEL - Abnormal; Notable for the following components:       Result Value    Chloride 109 (*)     AST 70 (*)     Alk Phosphatase 212 (*)     Total Protein 5.6 (*)     Albumin 2.6 (*)     Albumin/Globulin Ratio 0.9 (*)     All other components within normal limits   TSH - Abnormal; Notable for the following components:    TSH, 3rd Generation 0.29 (*)     All other components within normal limits   PROCALCITONIN - Abnormal; Notable for the following components:    Procalcitonin <0.05 (*)     All other components within normal limits       Background  Allergies: No Known Allergies  History:   Past Medical History:   Diagnosis Date    CAD (coronary artery disease)     High cholesterol     High cholesterol     HTN (hypertension)     Hypertension     Hypothyroidism     Hypothyroidism     PVD (peripheral vascular disease) (HCC)     Subarachnoid hemorrhage following injury     TIA (transient ischemic attack)     Venous thrombosis 10/04/2024    left thigh       Assessment  Vitals: MEWS Score: 4  Level of Consciousness: Alert (0)   Vitals:    24 1400 24 1430 24 1445 24 1500   BP: 103/78 117/87 124/86 112/63   Pulse: 100 (!) 114 (!) 116 (!) 130   Resp:    Temp:    97.8 °F (36.6

## 2024-12-12 NOTE — ED PROVIDER NOTES
results of their tests and reason(s) for their admission have been discussed with pt and/or available family. They convey agreement and understanding for the need to be admitted and for the admission diagnosis.    Additional Disposition Considerations:      Smoking Cessation:Not Applicable    DISCHARGE PLAN:  1.   Current Discharge Medication List        CONTINUE these medications which have NOT CHANGED    Details   atorvastatin (LIPITOR) 20 MG tablet Take 1 tablet by mouth daily      cyanocobalamin 1000 MCG tablet Take 1 tablet by mouth daily      levothyroxine (SYNTHROID) 137 MCG tablet Synthroid 137 mcg tablet      Cholecalciferol 50 MCG (2000 UT) CAPS Vitamin D3 50 mcg (2,000 unit) capsule   Take by oral route.      latanoprost (XALATAN) 0.005 % ophthalmic solution latanoprost 0.005 % eye drops      timolol (TIMOPTIC) 0.5 % ophthalmic solution Apply 1 drop to eye daily            2. No follow-up provider specified.  3.  Return to ED if worse    4.      Medication List        ASK your doctor about these medications      atorvastatin 20 MG tablet  Commonly known as: LIPITOR     cyanocobalamin 1000 MCG tablet     escitalopram 10 MG tablet  Commonly known as: LEXAPRO     latanoprost 0.005 % ophthalmic solution  Commonly known as: XALATAN     levothyroxine 137 MCG tablet  Commonly known as: SYNTHROID     lisinopril 2.5 MG tablet  Commonly known as: PRINIVIL;ZESTRIL     QUEtiapine 25 MG tablet  Commonly known as: SEROQUEL     timolol 0.5 % ophthalmic solution  Commonly known as: TIMOPTIC            5. Discontinued Medications:   Current Discharge Medication List        STOP taking these medications       Cholecalciferol 50 MCG (2000 UT) CAPS Comments:   Reason for Stopping:               Procedures     Unless otherwise noted below, none.    Performed by: Kyle Mccartney MD   Procedures      Critical Care Time     CRITICAL CARE NOTE :  9:53 PM    Critical care time is being documented due to the fulfillment of at

## 2024-12-12 NOTE — ED TRIAGE NOTES
BIB EMS from [facility] for left side chest pain, non radiating.     EKG for EMS reading Afib RVR  324 aspirin by EMS, 20 G left AC

## 2024-12-13 ENCOUNTER — APPOINTMENT (OUTPATIENT)
Facility: HOSPITAL | Age: 88
DRG: 871 | End: 2024-12-13
Payer: MEDICARE

## 2024-12-13 ENCOUNTER — APPOINTMENT (OUTPATIENT)
Facility: HOSPITAL | Age: 88
DRG: 871 | End: 2024-12-13
Attending: INTERNAL MEDICINE
Payer: MEDICARE

## 2024-12-13 LAB
ANION GAP SERPL CALC-SCNC: 8 MMOL/L (ref 2–12)
BUN SERPL-MCNC: 15 MG/DL (ref 6–20)
BUN/CREAT SERPL: 20 (ref 12–20)
CA-I BLD-MCNC: 9.3 MG/DL (ref 8.5–10.1)
CHLORIDE SERPL-SCNC: 111 MMOL/L (ref 97–108)
CO2 SERPL-SCNC: 22 MMOL/L (ref 21–32)
CREAT SERPL-MCNC: 0.74 MG/DL (ref 0.55–1.02)
EKG ATRIAL RATE: 58 BPM
EKG DIAGNOSIS: NORMAL
EKG P AXIS: 84 DEGREES
EKG P-R INTERVAL: 198 MS
EKG Q-T INTERVAL: 444 MS
EKG QRS DURATION: 82 MS
EKG QTC CALCULATION (BAZETT): 435 MS
EKG R AXIS: 39 DEGREES
EKG T AXIS: 29 DEGREES
EKG VENTRICULAR RATE: 58 BPM
ERYTHROCYTE [DISTWIDTH] IN BLOOD BY AUTOMATED COUNT: 13.1 % (ref 11.5–14.5)
FLUAV RNA SPEC QL NAA+PROBE: NOT DETECTED
FLUBV RNA SPEC QL NAA+PROBE: NOT DETECTED
GLUCOSE SERPL-MCNC: 101 MG/DL (ref 65–100)
HCT VFR BLD AUTO: 40.1 % (ref 35–47)
HGB BLD-MCNC: 12.6 G/DL (ref 11.5–16)
MCH RBC QN AUTO: 29.6 PG (ref 26–34)
MCHC RBC AUTO-ENTMCNC: 31.4 G/DL (ref 30–36.5)
MCV RBC AUTO: 94.1 FL (ref 80–99)
NRBC # BLD: 0 K/UL (ref 0–0.01)
NRBC BLD-RTO: 0 PER 100 WBC
PLATELET # BLD AUTO: 275 K/UL (ref 150–400)
PMV BLD AUTO: 10.5 FL (ref 8.9–12.9)
POTASSIUM SERPL-SCNC: 4.6 MMOL/L (ref 3.5–5.1)
RBC # BLD AUTO: 4.26 M/UL (ref 3.8–5.2)
SARS-COV-2 RNA RESP QL NAA+PROBE: NOT DETECTED
SODIUM SERPL-SCNC: 141 MMOL/L (ref 136–145)
T4 FREE SERPL-MCNC: 1.4 NG/DL (ref 0.8–1.5)
TSH SERPL DL<=0.05 MIU/L-ACNC: 0.29 UIU/ML (ref 0.36–3.74)
WBC # BLD AUTO: 9 K/UL (ref 3.6–11)

## 2024-12-13 PROCEDURE — 93005 ELECTROCARDIOGRAM TRACING: CPT | Performed by: PHYSICIAN ASSISTANT

## 2024-12-13 PROCEDURE — 2700000000 HC OXYGEN THERAPY PER DAY

## 2024-12-13 PROCEDURE — 6370000000 HC RX 637 (ALT 250 FOR IP): Performed by: HOSPITALIST

## 2024-12-13 PROCEDURE — 71045 X-RAY EXAM CHEST 1 VIEW: CPT

## 2024-12-13 PROCEDURE — 2580000003 HC RX 258: Performed by: HOSPITALIST

## 2024-12-13 PROCEDURE — 87636 SARSCOV2 & INF A&B AMP PRB: CPT

## 2024-12-13 PROCEDURE — 93306 TTE W/DOPPLER COMPLETE: CPT

## 2024-12-13 PROCEDURE — 6360000002 HC RX W HCPCS: Performed by: HOSPITALIST

## 2024-12-13 PROCEDURE — 76700 US EXAM ABDOM COMPLETE: CPT

## 2024-12-13 PROCEDURE — 36415 COLL VENOUS BLD VENIPUNCTURE: CPT

## 2024-12-13 PROCEDURE — 2060000000 HC ICU INTERMEDIATE R&B

## 2024-12-13 PROCEDURE — 6370000000 HC RX 637 (ALT 250 FOR IP)

## 2024-12-13 PROCEDURE — 85027 COMPLETE CBC AUTOMATED: CPT

## 2024-12-13 PROCEDURE — 80048 BASIC METABOLIC PNL TOTAL CA: CPT

## 2024-12-13 RX ORDER — IPRATROPIUM BROMIDE AND ALBUTEROL SULFATE 2.5; .5 MG/3ML; MG/3ML
1 SOLUTION RESPIRATORY (INHALATION)
Status: DISCONTINUED | OUTPATIENT
Start: 2024-12-14 | End: 2024-12-14

## 2024-12-13 RX ORDER — METOPROLOL TARTRATE 25 MG/1
12.5 TABLET, FILM COATED ORAL 2 TIMES DAILY
Status: DISCONTINUED | OUTPATIENT
Start: 2024-12-13 | End: 2024-12-18 | Stop reason: HOSPADM

## 2024-12-13 RX ORDER — LEVOTHYROXINE SODIUM 100 UG/1
100 TABLET ORAL DAILY
Status: DISCONTINUED | OUTPATIENT
Start: 2024-12-14 | End: 2024-12-18 | Stop reason: HOSPADM

## 2024-12-13 RX ADMIN — ENOXAPARIN SODIUM 40 MG: 100 INJECTION SUBCUTANEOUS at 09:31

## 2024-12-13 RX ADMIN — METOPROLOL TARTRATE 12.5 MG: 25 TABLET, FILM COATED ORAL at 21:13

## 2024-12-13 RX ADMIN — SODIUM CHLORIDE, PRESERVATIVE FREE 10 ML: 5 INJECTION INTRAVENOUS at 09:32

## 2024-12-13 RX ADMIN — WATER 1000 MG: 1 INJECTION INTRAMUSCULAR; INTRAVENOUS; SUBCUTANEOUS at 09:31

## 2024-12-13 RX ADMIN — LEVOTHYROXINE SODIUM 125 MCG: 0.03 TABLET ORAL at 06:54

## 2024-12-13 RX ADMIN — METOPROLOL TARTRATE 12.5 MG: 25 TABLET, FILM COATED ORAL at 09:32

## 2024-12-13 RX ADMIN — SODIUM CHLORIDE, PRESERVATIVE FREE 10 ML: 5 INJECTION INTRAVENOUS at 21:14

## 2024-12-13 RX ADMIN — POLYETHYLENE GLYCOL 3350 17 G: 17 POWDER, FOR SOLUTION ORAL at 11:00

## 2024-12-13 RX ADMIN — ATORVASTATIN CALCIUM 20 MG: 20 TABLET, FILM COATED ORAL at 09:32

## 2024-12-13 RX ADMIN — TIMOLOL MALEATE 1 DROP: 5 SOLUTION OPHTHALMIC at 09:35

## 2024-12-13 RX ADMIN — SODIUM CHLORIDE: 9 INJECTION, SOLUTION INTRAVENOUS at 12:59

## 2024-12-13 RX ADMIN — CYANOCOBALAMIN TAB 1000 MCG 1000 MCG: 1000 TAB at 09:32

## 2024-12-13 RX ADMIN — SODIUM CHLORIDE: 9 INJECTION, SOLUTION INTRAVENOUS at 02:55

## 2024-12-13 RX ADMIN — AZITHROMYCIN MONOHYDRATE 500 MG: 500 INJECTION, POWDER, LYOPHILIZED, FOR SOLUTION INTRAVENOUS at 12:23

## 2024-12-13 ASSESSMENT — PAIN SCALES - GENERAL: PAINLEVEL_OUTOF10: 0

## 2024-12-13 NOTE — CARE COORDINATION
Chart reviewed, DCP remains for patient to d/c from  to [facility] where patient is a LTC resident, patient has failed Dionnes, awaiting PT/OT recc to ensure patient can return.     continues to follow and monitor for needs.

## 2024-12-14 LAB
ANION GAP SERPL CALC-SCNC: 9 MMOL/L (ref 2–12)
APPEARANCE UR: ABNORMAL
BACTERIA URNS QL MICRO: NEGATIVE /HPF
BILIRUB UR QL: NEGATIVE
BUN SERPL-MCNC: 23 MG/DL (ref 6–20)
BUN/CREAT SERPL: 18 (ref 12–20)
CA-I BLD-MCNC: 9.8 MG/DL (ref 8.5–10.1)
CAOX CRY URNS QL MICRO: ABNORMAL
CHLORIDE SERPL-SCNC: 111 MMOL/L (ref 97–108)
CO2 SERPL-SCNC: 18 MMOL/L (ref 21–32)
COLOR UR: YELLOW
CREAT SERPL-MCNC: 1.29 MG/DL (ref 0.55–1.02)
ECHO BSA: 1.52 M2
ECHO EST RA PRESSURE: 15 MMHG
ECHO LA VOL MOD A4C: 40 ML (ref 22–52)
ECHO LA VOLUME INDEX MOD A4C: 26 ML/M2 (ref 16–34)
ECHO LV EF PHYSICIAN: 35 %
ECHO MV EROA CONT EQ: 0.4 CM2
ECHO RA END SYSTOLIC VOLUME APICAL 4 CHAMBER INDEX BSA: 24 ML/M2
ECHO RA VOLUME: 36 ML
ECHO RIGHT VENTRICULAR SYSTOLIC PRESSURE (RVSP): 33 MMHG
ECHO RV BASAL DIMENSION: 3.6 CM
ECHO RV MID DIMENSION: 3 CM
ECHO TV REGURGITANT MAX VELOCITY: 2.1 M/S
EKG ATRIAL RATE: 65 BPM
EKG DIAGNOSIS: NORMAL
EKG P AXIS: 59 DEGREES
EKG P-R INTERVAL: 164 MS
EKG Q-T INTERVAL: 500 MS
EKG QRS DURATION: 74 MS
EKG QTC CALCULATION (BAZETT): 520 MS
EKG R AXIS: 49 DEGREES
EKG T AXIS: 81 DEGREES
EKG VENTRICULAR RATE: 65 BPM
EPITH CASTS URNS QL MICRO: ABNORMAL /LPF
ERYTHROCYTE [DISTWIDTH] IN BLOOD BY AUTOMATED COUNT: 13.2 % (ref 11.5–14.5)
GLUCOSE SERPL-MCNC: 156 MG/DL (ref 65–100)
GLUCOSE UR STRIP.AUTO-MCNC: NEGATIVE MG/DL
HCT VFR BLD AUTO: 42.3 % (ref 35–47)
HGB BLD-MCNC: 13.4 G/DL (ref 11.5–16)
HGB UR QL STRIP: NEGATIVE
KETONES UR QL STRIP.AUTO: 5 MG/DL
LEUKOCYTE ESTERASE UR QL STRIP.AUTO: ABNORMAL
MCH RBC QN AUTO: 29.7 PG (ref 26–34)
MCHC RBC AUTO-ENTMCNC: 31.7 G/DL (ref 30–36.5)
MCV RBC AUTO: 93.8 FL (ref 80–99)
NITRITE UR QL STRIP.AUTO: NEGATIVE
NRBC # BLD: 0 K/UL (ref 0–0.01)
NRBC BLD-RTO: 0 PER 100 WBC
PH UR STRIP: 5 (ref 5–8)
PLATELET # BLD AUTO: 333 K/UL (ref 150–400)
PMV BLD AUTO: 11.1 FL (ref 8.9–12.9)
POTASSIUM SERPL-SCNC: 5.1 MMOL/L (ref 3.5–5.1)
PROT UR STRIP-MCNC: 100 MG/DL
RBC # BLD AUTO: 4.51 M/UL (ref 3.8–5.2)
RBC #/AREA URNS HPF: ABNORMAL /HPF (ref 0–5)
SODIUM SERPL-SCNC: 138 MMOL/L (ref 136–145)
SP GR UR REFRACTOMETRY: 1.02 (ref 1–1.03)
URINE CULTURE IF INDICATED: ABNORMAL
UROBILINOGEN UR QL STRIP.AUTO: 2 EU/DL (ref 0.1–1)
WBC # BLD AUTO: 12.6 K/UL (ref 3.6–11)
WBC URNS QL MICRO: ABNORMAL /HPF (ref 0–4)

## 2024-12-14 PROCEDURE — 93005 ELECTROCARDIOGRAM TRACING: CPT | Performed by: INTERNAL MEDICINE

## 2024-12-14 PROCEDURE — 87086 URINE CULTURE/COLONY COUNT: CPT

## 2024-12-14 PROCEDURE — 6370000000 HC RX 637 (ALT 250 FOR IP)

## 2024-12-14 PROCEDURE — 81001 URINALYSIS AUTO W/SCOPE: CPT

## 2024-12-14 PROCEDURE — 2700000000 HC OXYGEN THERAPY PER DAY

## 2024-12-14 PROCEDURE — 6360000002 HC RX W HCPCS: Performed by: PHYSICIAN ASSISTANT

## 2024-12-14 PROCEDURE — 97530 THERAPEUTIC ACTIVITIES: CPT

## 2024-12-14 PROCEDURE — 97165 OT EVAL LOW COMPLEX 30 MIN: CPT

## 2024-12-14 PROCEDURE — 6360000002 HC RX W HCPCS: Performed by: HOSPITALIST

## 2024-12-14 PROCEDURE — 94640 AIRWAY INHALATION TREATMENT: CPT

## 2024-12-14 PROCEDURE — 97161 PT EVAL LOW COMPLEX 20 MIN: CPT

## 2024-12-14 PROCEDURE — 6370000000 HC RX 637 (ALT 250 FOR IP): Performed by: PHYSICIAN ASSISTANT

## 2024-12-14 PROCEDURE — 2580000003 HC RX 258: Performed by: HOSPITALIST

## 2024-12-14 PROCEDURE — 6370000000 HC RX 637 (ALT 250 FOR IP): Performed by: HOSPITALIST

## 2024-12-14 PROCEDURE — 2060000000 HC ICU INTERMEDIATE R&B

## 2024-12-14 PROCEDURE — 85027 COMPLETE CBC AUTOMATED: CPT

## 2024-12-14 PROCEDURE — 80048 BASIC METABOLIC PNL TOTAL CA: CPT

## 2024-12-14 RX ORDER — FUROSEMIDE 10 MG/ML
40 INJECTION INTRAMUSCULAR; INTRAVENOUS ONCE
Status: COMPLETED | OUTPATIENT
Start: 2024-12-14 | End: 2024-12-14

## 2024-12-14 RX ORDER — IPRATROPIUM BROMIDE AND ALBUTEROL SULFATE 2.5; .5 MG/3ML; MG/3ML
1 SOLUTION RESPIRATORY (INHALATION) EVERY 4 HOURS PRN
Status: DISCONTINUED | OUTPATIENT
Start: 2024-12-14 | End: 2024-12-18 | Stop reason: HOSPADM

## 2024-12-14 RX ORDER — SODIUM BICARBONATE 650 MG/1
325 TABLET ORAL 2 TIMES DAILY
Status: DISCONTINUED | OUTPATIENT
Start: 2024-12-14 | End: 2024-12-18 | Stop reason: HOSPADM

## 2024-12-14 RX ADMIN — CYANOCOBALAMIN TAB 1000 MCG 1000 MCG: 1000 TAB at 09:35

## 2024-12-14 RX ADMIN — FUROSEMIDE 40 MG: 10 INJECTION, SOLUTION INTRAMUSCULAR; INTRAVENOUS at 01:06

## 2024-12-14 RX ADMIN — TIMOLOL MALEATE 1 DROP: 5 SOLUTION OPHTHALMIC at 09:36

## 2024-12-14 RX ADMIN — AZITHROMYCIN MONOHYDRATE 500 MG: 500 INJECTION, POWDER, LYOPHILIZED, FOR SOLUTION INTRAVENOUS at 13:23

## 2024-12-14 RX ADMIN — SODIUM CHLORIDE, PRESERVATIVE FREE 10 ML: 5 INJECTION INTRAVENOUS at 09:37

## 2024-12-14 RX ADMIN — ENOXAPARIN SODIUM 40 MG: 100 INJECTION SUBCUTANEOUS at 09:36

## 2024-12-14 RX ADMIN — ATORVASTATIN CALCIUM 20 MG: 20 TABLET, FILM COATED ORAL at 09:35

## 2024-12-14 RX ADMIN — IPRATROPIUM BROMIDE AND ALBUTEROL SULFATE 1 DOSE: 2.5; .5 SOLUTION RESPIRATORY (INHALATION) at 07:57

## 2024-12-14 RX ADMIN — WATER 1000 MG: 1 INJECTION INTRAMUSCULAR; INTRAVENOUS; SUBCUTANEOUS at 09:36

## 2024-12-14 RX ADMIN — SODIUM CHLORIDE, PRESERVATIVE FREE 10 ML: 5 INJECTION INTRAVENOUS at 20:43

## 2024-12-14 RX ADMIN — SODIUM BICARBONATE 325 MG: 650 TABLET ORAL at 20:42

## 2024-12-14 RX ADMIN — METOPROLOL TARTRATE 12.5 MG: 25 TABLET, FILM COATED ORAL at 09:35

## 2024-12-14 RX ADMIN — LEVOTHYROXINE SODIUM 100 MCG: 0.1 TABLET ORAL at 06:27

## 2024-12-15 ENCOUNTER — APPOINTMENT (OUTPATIENT)
Facility: HOSPITAL | Age: 88
DRG: 871 | End: 2024-12-15
Payer: MEDICARE

## 2024-12-15 LAB
ANION GAP SERPL CALC-SCNC: 10 MMOL/L (ref 2–12)
ANION GAP SERPL CALC-SCNC: 8 MMOL/L (ref 2–12)
BACTERIA SPEC CULT: NORMAL
BASOPHILS # BLD: 0 K/UL (ref 0–0.1)
BASOPHILS NFR BLD: 0 % (ref 0–1)
BUN SERPL-MCNC: 29 MG/DL (ref 6–20)
BUN SERPL-MCNC: 31 MG/DL (ref 6–20)
BUN/CREAT SERPL: 27 (ref 12–20)
BUN/CREAT SERPL: 28 (ref 12–20)
CA-I BLD-MCNC: 9.4 MG/DL (ref 8.5–10.1)
CA-I BLD-MCNC: 9.6 MG/DL (ref 8.5–10.1)
CHLORIDE SERPL-SCNC: 112 MMOL/L (ref 97–108)
CHLORIDE SERPL-SCNC: 112 MMOL/L (ref 97–108)
CO2 SERPL-SCNC: 21 MMOL/L (ref 21–32)
CO2 SERPL-SCNC: 21 MMOL/L (ref 21–32)
CREAT SERPL-MCNC: 1.03 MG/DL (ref 0.55–1.02)
CREAT SERPL-MCNC: 1.13 MG/DL (ref 0.55–1.02)
DIFFERENTIAL METHOD BLD: ABNORMAL
EOSINOPHIL # BLD: 0 K/UL (ref 0–0.4)
EOSINOPHIL NFR BLD: 0 % (ref 0–7)
ERYTHROCYTE [DISTWIDTH] IN BLOOD BY AUTOMATED COUNT: 13.5 % (ref 11.5–14.5)
GLUCOSE BLD STRIP.AUTO-MCNC: 108 MG/DL (ref 65–100)
GLUCOSE BLD STRIP.AUTO-MCNC: 114 MG/DL (ref 65–100)
GLUCOSE SERPL-MCNC: 110 MG/DL (ref 65–100)
GLUCOSE SERPL-MCNC: 115 MG/DL (ref 65–100)
HCT VFR BLD AUTO: 39.4 % (ref 35–47)
HGB BLD-MCNC: 12.8 G/DL (ref 11.5–16)
IMM GRANULOCYTES # BLD AUTO: 0.1 K/UL (ref 0–0.04)
IMM GRANULOCYTES NFR BLD AUTO: 1 % (ref 0–0.5)
LYMPHOCYTES # BLD: 1 K/UL (ref 0.8–3.5)
LYMPHOCYTES NFR BLD: 6 % (ref 12–49)
Lab: NORMAL
MCH RBC QN AUTO: 29.8 PG (ref 26–34)
MCHC RBC AUTO-ENTMCNC: 32.5 G/DL (ref 30–36.5)
MCV RBC AUTO: 91.8 FL (ref 80–99)
MONOCYTES # BLD: 1 K/UL (ref 0–1)
MONOCYTES NFR BLD: 7 % (ref 5–13)
NEUTS SEG # BLD: 13.4 K/UL (ref 1.8–8)
NEUTS SEG NFR BLD: 86 % (ref 32–75)
NRBC # BLD: 0 K/UL (ref 0–0.01)
NRBC BLD-RTO: 0 PER 100 WBC
PERFORMED BY:: ABNORMAL
PERFORMED BY:: ABNORMAL
PLATELET # BLD AUTO: 319 K/UL (ref 150–400)
PMV BLD AUTO: 10.7 FL (ref 8.9–12.9)
POTASSIUM SERPL-SCNC: 4.3 MMOL/L (ref 3.5–5.1)
POTASSIUM SERPL-SCNC: 4.6 MMOL/L (ref 3.5–5.1)
RBC # BLD AUTO: 4.29 M/UL (ref 3.8–5.2)
SODIUM SERPL-SCNC: 141 MMOL/L (ref 136–145)
SODIUM SERPL-SCNC: 143 MMOL/L (ref 136–145)
WBC # BLD AUTO: 15.6 K/UL (ref 3.6–11)

## 2024-12-15 PROCEDURE — 82962 GLUCOSE BLOOD TEST: CPT

## 2024-12-15 PROCEDURE — 6360000002 HC RX W HCPCS

## 2024-12-15 PROCEDURE — 93005 ELECTROCARDIOGRAM TRACING: CPT | Performed by: INTERNAL MEDICINE

## 2024-12-15 PROCEDURE — 6370000000 HC RX 637 (ALT 250 FOR IP)

## 2024-12-15 PROCEDURE — 6370000000 HC RX 637 (ALT 250 FOR IP): Performed by: HOSPITALIST

## 2024-12-15 PROCEDURE — 6360000004 HC RX CONTRAST MEDICATION

## 2024-12-15 PROCEDURE — 70551 MRI BRAIN STEM W/O DYE: CPT

## 2024-12-15 PROCEDURE — 2580000003 HC RX 258

## 2024-12-15 PROCEDURE — 6360000002 HC RX W HCPCS: Performed by: HOSPITALIST

## 2024-12-15 PROCEDURE — 80048 BASIC METABOLIC PNL TOTAL CA: CPT

## 2024-12-15 PROCEDURE — 70498 CT ANGIOGRAPHY NECK: CPT

## 2024-12-15 PROCEDURE — 70450 CT HEAD/BRAIN W/O DYE: CPT

## 2024-12-15 PROCEDURE — 2580000003 HC RX 258: Performed by: HOSPITALIST

## 2024-12-15 PROCEDURE — 36415 COLL VENOUS BLD VENIPUNCTURE: CPT

## 2024-12-15 PROCEDURE — 85025 COMPLETE CBC W/AUTO DIFF WBC: CPT

## 2024-12-15 PROCEDURE — 71045 X-RAY EXAM CHEST 1 VIEW: CPT

## 2024-12-15 PROCEDURE — 2060000000 HC ICU INTERMEDIATE R&B

## 2024-12-15 RX ORDER — POLYETHYLENE GLYCOL 3350 17 G/17G
17 POWDER, FOR SOLUTION ORAL DAILY PRN
Status: DISCONTINUED | OUTPATIENT
Start: 2024-12-15 | End: 2024-12-18 | Stop reason: HOSPADM

## 2024-12-15 RX ORDER — LORAZEPAM 2 MG/ML
0.5 INJECTION INTRAMUSCULAR ONCE
Status: COMPLETED | OUTPATIENT
Start: 2024-12-15 | End: 2024-12-15

## 2024-12-15 RX ORDER — SODIUM CHLORIDE 0.9 % (FLUSH) 0.9 %
5-40 SYRINGE (ML) INJECTION EVERY 12 HOURS SCHEDULED
Status: DISCONTINUED | OUTPATIENT
Start: 2024-12-15 | End: 2024-12-18 | Stop reason: HOSPADM

## 2024-12-15 RX ORDER — LABETALOL HYDROCHLORIDE 5 MG/ML
10 INJECTION, SOLUTION INTRAVENOUS EVERY 10 MIN PRN
Status: DISCONTINUED | OUTPATIENT
Start: 2024-12-15 | End: 2024-12-18 | Stop reason: HOSPADM

## 2024-12-15 RX ORDER — SODIUM CHLORIDE 9 MG/ML
INJECTION, SOLUTION INTRAVENOUS CONTINUOUS
Status: DISCONTINUED | OUTPATIENT
Start: 2024-12-15 | End: 2024-12-16

## 2024-12-15 RX ORDER — IOPAMIDOL 755 MG/ML
100 INJECTION, SOLUTION INTRAVASCULAR
Status: COMPLETED | OUTPATIENT
Start: 2024-12-15 | End: 2024-12-15

## 2024-12-15 RX ORDER — ATORVASTATIN CALCIUM 40 MG/1
80 TABLET, FILM COATED ORAL NIGHTLY
Status: DISCONTINUED | OUTPATIENT
Start: 2024-12-15 | End: 2024-12-18 | Stop reason: HOSPADM

## 2024-12-15 RX ORDER — SODIUM CHLORIDE 9 MG/ML
INJECTION, SOLUTION INTRAVENOUS PRN
Status: DISCONTINUED | OUTPATIENT
Start: 2024-12-15 | End: 2024-12-18 | Stop reason: HOSPADM

## 2024-12-15 RX ORDER — SODIUM CHLORIDE 0.9 % (FLUSH) 0.9 %
5-40 SYRINGE (ML) INJECTION PRN
Status: DISCONTINUED | OUTPATIENT
Start: 2024-12-15 | End: 2024-12-18 | Stop reason: HOSPADM

## 2024-12-15 RX ORDER — LORAZEPAM 0.5 MG/1
0.5 TABLET ORAL ONCE
Status: DISCONTINUED | OUTPATIENT
Start: 2024-12-15 | End: 2024-12-15

## 2024-12-15 RX ORDER — AZITHROMYCIN 500 MG/1
500 TABLET, FILM COATED ORAL ONCE
Status: COMPLETED | OUTPATIENT
Start: 2024-12-15 | End: 2024-12-15

## 2024-12-15 RX ADMIN — SODIUM BICARBONATE 325 MG: 650 TABLET ORAL at 20:14

## 2024-12-15 RX ADMIN — TIMOLOL MALEATE 1 DROP: 5 SOLUTION OPHTHALMIC at 07:45

## 2024-12-15 RX ADMIN — SODIUM CHLORIDE, PRESERVATIVE FREE 10 ML: 5 INJECTION INTRAVENOUS at 07:44

## 2024-12-15 RX ADMIN — METOPROLOL TARTRATE 12.5 MG: 25 TABLET, FILM COATED ORAL at 07:53

## 2024-12-15 RX ADMIN — ATORVASTATIN CALCIUM 20 MG: 20 TABLET, FILM COATED ORAL at 07:54

## 2024-12-15 RX ADMIN — CYANOCOBALAMIN TAB 1000 MCG 1000 MCG: 1000 TAB at 07:54

## 2024-12-15 RX ADMIN — AZITHROMYCIN DIHYDRATE 500 MG: 500 TABLET ORAL at 11:50

## 2024-12-15 RX ADMIN — LORAZEPAM 0.5 MG: 2 INJECTION INTRAMUSCULAR; INTRAVENOUS at 17:54

## 2024-12-15 RX ADMIN — LEVOTHYROXINE SODIUM 100 MCG: 0.1 TABLET ORAL at 06:04

## 2024-12-15 RX ADMIN — IOPAMIDOL 100 ML: 755 INJECTION, SOLUTION INTRAVENOUS at 17:46

## 2024-12-15 RX ADMIN — ATORVASTATIN CALCIUM 80 MG: 40 TABLET, FILM COATED ORAL at 20:14

## 2024-12-15 RX ADMIN — WATER 1000 MG: 1 INJECTION INTRAMUSCULAR; INTRAVENOUS; SUBCUTANEOUS at 09:51

## 2024-12-15 RX ADMIN — ENOXAPARIN SODIUM 40 MG: 100 INJECTION SUBCUTANEOUS at 09:30

## 2024-12-15 RX ADMIN — SODIUM CHLORIDE: 9 INJECTION, SOLUTION INTRAVENOUS at 18:37

## 2024-12-15 RX ADMIN — SODIUM BICARBONATE 325 MG: 650 TABLET ORAL at 07:53

## 2024-12-15 RX ADMIN — PIPERACILLIN AND TAZOBACTAM 3375 MG: 3; .375 INJECTION, POWDER, LYOPHILIZED, FOR SOLUTION INTRAVENOUS at 18:42

## 2024-12-15 RX ADMIN — METOPROLOL TARTRATE 12.5 MG: 25 TABLET, FILM COATED ORAL at 20:14

## 2024-12-15 RX ADMIN — SODIUM CHLORIDE, PRESERVATIVE FREE 10 ML: 5 INJECTION INTRAVENOUS at 20:14

## 2024-12-16 ENCOUNTER — APPOINTMENT (OUTPATIENT)
Facility: HOSPITAL | Age: 88
DRG: 871 | End: 2024-12-16
Payer: MEDICARE

## 2024-12-16 PROBLEM — Z71.89 ACP (ADVANCE CARE PLANNING): Status: ACTIVE | Noted: 2024-12-16

## 2024-12-16 PROBLEM — Z51.5 PALLIATIVE CARE BY SPECIALIST: Status: ACTIVE | Noted: 2024-12-16

## 2024-12-16 PROBLEM — Z71.89 DNR (DO NOT RESUSCITATE) DISCUSSION: Status: ACTIVE | Noted: 2024-12-16

## 2024-12-16 LAB
ANION GAP SERPL CALC-SCNC: 8 MMOL/L (ref 2–12)
BUN SERPL-MCNC: 29 MG/DL (ref 6–20)
BUN/CREAT SERPL: 27 (ref 12–20)
CA-I BLD-MCNC: 9.6 MG/DL (ref 8.5–10.1)
CHLORIDE SERPL-SCNC: 114 MMOL/L (ref 97–108)
CHOLEST SERPL-MCNC: 108 MG/DL
CO2 SERPL-SCNC: 21 MMOL/L (ref 21–32)
CREAT SERPL-MCNC: 1.07 MG/DL (ref 0.55–1.02)
EKG ATRIAL RATE: 77 BPM
EKG DIAGNOSIS: NORMAL
EKG P AXIS: 73 DEGREES
EKG P-R INTERVAL: 162 MS
EKG Q-T INTERVAL: 446 MS
EKG QRS DURATION: 74 MS
EKG QTC CALCULATION (BAZETT): 504 MS
EKG R AXIS: 56 DEGREES
EKG T AXIS: 108 DEGREES
EKG VENTRICULAR RATE: 77 BPM
ERYTHROCYTE [DISTWIDTH] IN BLOOD BY AUTOMATED COUNT: 13.6 % (ref 11.5–14.5)
EST. AVERAGE GLUCOSE BLD GHB EST-MCNC: 134 MG/DL
GLUCOSE BLD STRIP.AUTO-MCNC: 144 MG/DL (ref 65–100)
GLUCOSE BLD STRIP.AUTO-MCNC: 193 MG/DL (ref 65–100)
GLUCOSE BLD STRIP.AUTO-MCNC: 63 MG/DL (ref 65–100)
GLUCOSE BLD STRIP.AUTO-MCNC: 86 MG/DL (ref 65–100)
GLUCOSE SERPL-MCNC: 92 MG/DL (ref 65–100)
HBA1C MFR BLD: 6.3 % (ref 4–5.6)
HCT VFR BLD AUTO: 43.6 % (ref 35–47)
HDLC SERPL-MCNC: 32 MG/DL
HDLC SERPL: 3.4 (ref 0–5)
HGB BLD-MCNC: 14 G/DL (ref 11.5–16)
LDLC SERPL CALC-MCNC: 56.8 MG/DL (ref 0–100)
LIPID PANEL: NORMAL
MCH RBC QN AUTO: 29.7 PG (ref 26–34)
MCHC RBC AUTO-ENTMCNC: 32.1 G/DL (ref 30–36.5)
MCV RBC AUTO: 92.4 FL (ref 80–99)
NRBC # BLD: 0 K/UL (ref 0–0.01)
NRBC BLD-RTO: 0 PER 100 WBC
PERFORMED BY:: ABNORMAL
PERFORMED BY:: NORMAL
PLATELET # BLD AUTO: 286 K/UL (ref 150–400)
PMV BLD AUTO: 10.7 FL (ref 8.9–12.9)
POTASSIUM SERPL-SCNC: 4.4 MMOL/L (ref 3.5–5.1)
RBC # BLD AUTO: 4.72 M/UL (ref 3.8–5.2)
SODIUM SERPL-SCNC: 143 MMOL/L (ref 136–145)
TRIGL SERPL-MCNC: 96 MG/DL
VLDLC SERPL CALC-MCNC: 19.2 MG/DL
WBC # BLD AUTO: 13 K/UL (ref 3.6–11)

## 2024-12-16 PROCEDURE — 6370000000 HC RX 637 (ALT 250 FOR IP): Performed by: INTERNAL MEDICINE

## 2024-12-16 PROCEDURE — 71045 X-RAY EXAM CHEST 1 VIEW: CPT

## 2024-12-16 PROCEDURE — 82962 GLUCOSE BLOOD TEST: CPT

## 2024-12-16 PROCEDURE — 80061 LIPID PANEL: CPT

## 2024-12-16 PROCEDURE — 6360000002 HC RX W HCPCS

## 2024-12-16 PROCEDURE — 6370000000 HC RX 637 (ALT 250 FOR IP): Performed by: HOSPITALIST

## 2024-12-16 PROCEDURE — 1100000000 HC RM PRIVATE

## 2024-12-16 PROCEDURE — 99223 1ST HOSP IP/OBS HIGH 75: CPT | Performed by: PSYCHIATRY & NEUROLOGY

## 2024-12-16 PROCEDURE — 6370000000 HC RX 637 (ALT 250 FOR IP)

## 2024-12-16 PROCEDURE — 36415 COLL VENOUS BLD VENIPUNCTURE: CPT

## 2024-12-16 PROCEDURE — 80048 BASIC METABOLIC PNL TOTAL CA: CPT

## 2024-12-16 PROCEDURE — 83036 HEMOGLOBIN GLYCOSYLATED A1C: CPT

## 2024-12-16 PROCEDURE — 99223 1ST HOSP IP/OBS HIGH 75: CPT | Performed by: FAMILY MEDICINE

## 2024-12-16 PROCEDURE — 2580000003 HC RX 258: Performed by: HOSPITALIST

## 2024-12-16 PROCEDURE — 2580000003 HC RX 258

## 2024-12-16 PROCEDURE — 85027 COMPLETE CBC AUTOMATED: CPT

## 2024-12-16 PROCEDURE — 2700000000 HC OXYGEN THERAPY PER DAY

## 2024-12-16 PROCEDURE — 94761 N-INVAS EAR/PLS OXIMETRY MLT: CPT

## 2024-12-16 RX ORDER — GLUCAGON 1 MG/ML
1 KIT INJECTION PRN
Status: DISCONTINUED | OUTPATIENT
Start: 2024-12-16 | End: 2024-12-18 | Stop reason: HOSPADM

## 2024-12-16 RX ORDER — FUROSEMIDE 10 MG/ML
40 INJECTION INTRAMUSCULAR; INTRAVENOUS DAILY
Status: DISCONTINUED | OUTPATIENT
Start: 2024-12-16 | End: 2024-12-18 | Stop reason: HOSPADM

## 2024-12-16 RX ORDER — DEXTROSE MONOHYDRATE 100 MG/ML
INJECTION, SOLUTION INTRAVENOUS CONTINUOUS PRN
Status: DISCONTINUED | OUTPATIENT
Start: 2024-12-16 | End: 2024-12-18 | Stop reason: HOSPADM

## 2024-12-16 RX ORDER — LISINOPRIL 10 MG/1
5 TABLET ORAL DAILY
Status: DISCONTINUED | OUTPATIENT
Start: 2024-12-16 | End: 2024-12-18 | Stop reason: HOSPADM

## 2024-12-16 RX ADMIN — FUROSEMIDE 40 MG: 10 INJECTION, SOLUTION INTRAMUSCULAR; INTRAVENOUS at 16:29

## 2024-12-16 RX ADMIN — METOPROLOL TARTRATE 12.5 MG: 25 TABLET, FILM COATED ORAL at 20:45

## 2024-12-16 RX ADMIN — TIMOLOL MALEATE 1 DROP: 5 SOLUTION OPHTHALMIC at 09:20

## 2024-12-16 RX ADMIN — ATORVASTATIN CALCIUM 80 MG: 40 TABLET, FILM COATED ORAL at 20:43

## 2024-12-16 RX ADMIN — PIPERACILLIN AND TAZOBACTAM 3375 MG: 3; .375 INJECTION, POWDER, LYOPHILIZED, FOR SOLUTION INTRAVENOUS at 09:33

## 2024-12-16 RX ADMIN — SODIUM BICARBONATE 325 MG: 650 TABLET ORAL at 09:20

## 2024-12-16 RX ADMIN — DEXTROSE MONOHYDRATE 125 ML: 100 INJECTION, SOLUTION INTRAVENOUS at 20:41

## 2024-12-16 RX ADMIN — PIPERACILLIN AND TAZOBACTAM 3375 MG: 3; .375 INJECTION, POWDER, LYOPHILIZED, FOR SOLUTION INTRAVENOUS at 01:44

## 2024-12-16 RX ADMIN — LISINOPRIL 5 MG: 10 TABLET ORAL at 15:04

## 2024-12-16 RX ADMIN — CYANOCOBALAMIN TAB 1000 MCG 1000 MCG: 1000 TAB at 09:20

## 2024-12-16 RX ADMIN — LEVOTHYROXINE SODIUM 100 MCG: 0.1 TABLET ORAL at 05:40

## 2024-12-16 RX ADMIN — METOPROLOL TARTRATE 12.5 MG: 25 TABLET, FILM COATED ORAL at 09:20

## 2024-12-16 RX ADMIN — SODIUM CHLORIDE, PRESERVATIVE FREE 10 ML: 5 INJECTION INTRAVENOUS at 20:49

## 2024-12-16 RX ADMIN — SODIUM BICARBONATE 325 MG: 650 TABLET ORAL at 20:46

## 2024-12-16 RX ADMIN — PIPERACILLIN AND TAZOBACTAM 3375 MG: 3; .375 INJECTION, POWDER, LYOPHILIZED, FOR SOLUTION INTRAVENOUS at 16:28

## 2024-12-16 ASSESSMENT — PAIN SCALES - GENERAL
PAINLEVEL_OUTOF10: 0
PAINLEVEL_OUTOF10: 0
PAINLEVEL_OUTOF10: 2

## 2024-12-16 NOTE — CARE COORDINATION
Telephone call to POA to discuss recommendations from therapy. Recommendations are Moderate intensity short-term skilled physical therapy up to 5x/week. She would like us to discuss the rec's with [facility].    Call and spoke with [facility]. They would like us to try and get her into [facility].    Spoke with POA, she gave ok to try and get her into [facility]. Referral sent via [employer].

## 2024-12-17 PROBLEM — I48.91 ATRIAL FIBRILLATION WITH RVR (HCC): Status: ACTIVE | Noted: 2024-12-17

## 2024-12-17 PROBLEM — G93.40 ENCEPHALOPATHY: Status: ACTIVE | Noted: 2024-12-17

## 2024-12-17 LAB
ANION GAP SERPL CALC-SCNC: 7 MMOL/L (ref 2–12)
BUN SERPL-MCNC: 25 MG/DL (ref 6–20)
BUN/CREAT SERPL: 23 (ref 12–20)
CA-I BLD-MCNC: 9 MG/DL (ref 8.5–10.1)
CHLORIDE SERPL-SCNC: 113 MMOL/L (ref 97–108)
CO2 SERPL-SCNC: 25 MMOL/L (ref 21–32)
CREAT SERPL-MCNC: 1.1 MG/DL (ref 0.55–1.02)
ERYTHROCYTE [DISTWIDTH] IN BLOOD BY AUTOMATED COUNT: 13.9 % (ref 11.5–14.5)
GLUCOSE BLD STRIP.AUTO-MCNC: 100 MG/DL (ref 65–100)
GLUCOSE BLD STRIP.AUTO-MCNC: 102 MG/DL (ref 65–100)
GLUCOSE BLD STRIP.AUTO-MCNC: 108 MG/DL (ref 65–100)
GLUCOSE BLD STRIP.AUTO-MCNC: 112 MG/DL (ref 65–100)
GLUCOSE BLD STRIP.AUTO-MCNC: 96 MG/DL (ref 65–100)
GLUCOSE SERPL-MCNC: 94 MG/DL (ref 65–100)
HCT VFR BLD AUTO: 34.8 % (ref 35–47)
HGB BLD-MCNC: 11.6 G/DL (ref 11.5–16)
MCH RBC QN AUTO: 29.9 PG (ref 26–34)
MCHC RBC AUTO-ENTMCNC: 33.3 G/DL (ref 30–36.5)
MCV RBC AUTO: 89.7 FL (ref 80–99)
NRBC # BLD: 0 K/UL (ref 0–0.01)
NRBC BLD-RTO: 0 PER 100 WBC
PERFORMED BY:: ABNORMAL
PERFORMED BY:: NORMAL
PERFORMED BY:: NORMAL
PLATELET # BLD AUTO: 281 K/UL (ref 150–400)
PMV BLD AUTO: 10.5 FL (ref 8.9–12.9)
POTASSIUM SERPL-SCNC: 3.3 MMOL/L (ref 3.5–5.1)
RBC # BLD AUTO: 3.88 M/UL (ref 3.8–5.2)
SODIUM SERPL-SCNC: 145 MMOL/L (ref 136–145)
WBC # BLD AUTO: 9.6 K/UL (ref 3.6–11)

## 2024-12-17 PROCEDURE — 97530 THERAPEUTIC ACTIVITIES: CPT

## 2024-12-17 PROCEDURE — 6360000002 HC RX W HCPCS

## 2024-12-17 PROCEDURE — 99231 SBSQ HOSP IP/OBS SF/LOW 25: CPT | Performed by: FAMILY MEDICINE

## 2024-12-17 PROCEDURE — 6370000000 HC RX 637 (ALT 250 FOR IP): Performed by: HOSPITALIST

## 2024-12-17 PROCEDURE — 85027 COMPLETE CBC AUTOMATED: CPT

## 2024-12-17 PROCEDURE — 2580000003 HC RX 258

## 2024-12-17 PROCEDURE — 94761 N-INVAS EAR/PLS OXIMETRY MLT: CPT

## 2024-12-17 PROCEDURE — 80048 BASIC METABOLIC PNL TOTAL CA: CPT

## 2024-12-17 PROCEDURE — 1100000000 HC RM PRIVATE

## 2024-12-17 PROCEDURE — 6370000000 HC RX 637 (ALT 250 FOR IP): Performed by: INTERNAL MEDICINE

## 2024-12-17 PROCEDURE — 2500000003 HC RX 250 WO HCPCS: Performed by: HOSPITALIST

## 2024-12-17 PROCEDURE — 82962 GLUCOSE BLOOD TEST: CPT

## 2024-12-17 PROCEDURE — 36415 COLL VENOUS BLD VENIPUNCTURE: CPT

## 2024-12-17 PROCEDURE — 2700000000 HC OXYGEN THERAPY PER DAY

## 2024-12-17 PROCEDURE — 2500000003 HC RX 250 WO HCPCS

## 2024-12-17 PROCEDURE — 6370000000 HC RX 637 (ALT 250 FOR IP)

## 2024-12-17 RX ADMIN — ATORVASTATIN CALCIUM 80 MG: 40 TABLET, FILM COATED ORAL at 22:02

## 2024-12-17 RX ADMIN — SODIUM CHLORIDE: 9 INJECTION, SOLUTION INTRAVENOUS at 09:09

## 2024-12-17 RX ADMIN — SODIUM CHLORIDE, PRESERVATIVE FREE 10 ML: 5 INJECTION INTRAVENOUS at 22:02

## 2024-12-17 RX ADMIN — METOPROLOL TARTRATE 12.5 MG: 25 TABLET, FILM COATED ORAL at 09:11

## 2024-12-17 RX ADMIN — SODIUM CHLORIDE 600 ML: 9 INJECTION, SOLUTION INTRAVENOUS at 00:35

## 2024-12-17 RX ADMIN — SODIUM BICARBONATE 325 MG: 650 TABLET ORAL at 09:10

## 2024-12-17 RX ADMIN — SODIUM BICARBONATE 325 MG: 650 TABLET ORAL at 22:03

## 2024-12-17 RX ADMIN — CYANOCOBALAMIN TAB 1000 MCG 1000 MCG: 1000 TAB at 09:11

## 2024-12-17 RX ADMIN — PIPERACILLIN AND TAZOBACTAM 3375 MG: 3; .375 INJECTION, POWDER, LYOPHILIZED, FOR SOLUTION INTRAVENOUS at 09:10

## 2024-12-17 RX ADMIN — TIMOLOL MALEATE 1 DROP: 5 SOLUTION OPHTHALMIC at 09:10

## 2024-12-17 RX ADMIN — SODIUM CHLORIDE, PRESERVATIVE FREE 10 ML: 5 INJECTION INTRAVENOUS at 22:04

## 2024-12-17 RX ADMIN — LISINOPRIL 5 MG: 10 TABLET ORAL at 09:10

## 2024-12-17 RX ADMIN — METOPROLOL TARTRATE 12.5 MG: 25 TABLET, FILM COATED ORAL at 22:03

## 2024-12-17 RX ADMIN — PIPERACILLIN AND TAZOBACTAM 3375 MG: 3; .375 INJECTION, POWDER, LYOPHILIZED, FOR SOLUTION INTRAVENOUS at 00:36

## 2024-12-17 RX ADMIN — PIPERACILLIN AND TAZOBACTAM 3375 MG: 3; .375 INJECTION, POWDER, LYOPHILIZED, FOR SOLUTION INTRAVENOUS at 18:37

## 2024-12-17 RX ADMIN — LEVOTHYROXINE SODIUM 100 MCG: 0.1 TABLET ORAL at 06:10

## 2024-12-17 ASSESSMENT — PAIN SCALES - GENERAL
PAINLEVEL_OUTOF10: 0

## 2024-12-17 NOTE — CARE COORDINATION
DC Plan: [facility] (accepted)    Pt is from [facility].     Barriers to dc: Neuro and nephro clearance

## 2024-12-18 VITALS
DIASTOLIC BLOOD PRESSURE: 56 MMHG | HEART RATE: 53 BPM | BODY MASS INDEX: 21.21 KG/M2 | WEIGHT: 119.71 LBS | OXYGEN SATURATION: 98 % | RESPIRATION RATE: 14 BRPM | SYSTOLIC BLOOD PRESSURE: 107 MMHG | HEIGHT: 63 IN | TEMPERATURE: 97.9 F

## 2024-12-18 LAB
BACTERIA SPEC CULT: NORMAL
BACTERIA SPEC CULT: NORMAL
GLUCOSE BLD STRIP.AUTO-MCNC: 101 MG/DL (ref 65–100)
GLUCOSE BLD STRIP.AUTO-MCNC: 111 MG/DL (ref 65–100)
GLUCOSE BLD STRIP.AUTO-MCNC: 200 MG/DL (ref 65–100)
GLUCOSE BLD STRIP.AUTO-MCNC: 97 MG/DL (ref 65–100)
GLUCOSE BLD STRIP.AUTO-MCNC: 97 MG/DL (ref 65–100)
Lab: NORMAL
Lab: NORMAL
PERFORMED BY:: ABNORMAL
PERFORMED BY:: NORMAL
PERFORMED BY:: NORMAL

## 2024-12-18 PROCEDURE — 97530 THERAPEUTIC ACTIVITIES: CPT

## 2024-12-18 PROCEDURE — 6370000000 HC RX 637 (ALT 250 FOR IP): Performed by: INTERNAL MEDICINE

## 2024-12-18 PROCEDURE — 6370000000 HC RX 637 (ALT 250 FOR IP)

## 2024-12-18 PROCEDURE — 2580000003 HC RX 258

## 2024-12-18 PROCEDURE — 82962 GLUCOSE BLOOD TEST: CPT

## 2024-12-18 PROCEDURE — 2500000003 HC RX 250 WO HCPCS: Performed by: HOSPITALIST

## 2024-12-18 PROCEDURE — 6360000002 HC RX W HCPCS

## 2024-12-18 PROCEDURE — 99231 SBSQ HOSP IP/OBS SF/LOW 25: CPT | Performed by: FAMILY MEDICINE

## 2024-12-18 PROCEDURE — 6370000000 HC RX 637 (ALT 250 FOR IP): Performed by: STUDENT IN AN ORGANIZED HEALTH CARE EDUCATION/TRAINING PROGRAM

## 2024-12-18 PROCEDURE — 6370000000 HC RX 637 (ALT 250 FOR IP): Performed by: HOSPITALIST

## 2024-12-18 RX ORDER — FUROSEMIDE 40 MG/1
40 TABLET ORAL DAILY
Qty: 60 TABLET | Refills: 3 | Status: SHIPPED | OUTPATIENT
Start: 2024-12-18

## 2024-12-18 RX ORDER — SODIUM BICARBONATE 325 MG/1
325 TABLET ORAL 2 TIMES DAILY
Qty: 60 TABLET | Refills: 0 | Status: SHIPPED | OUTPATIENT
Start: 2024-12-18

## 2024-12-18 RX ORDER — POTASSIUM CHLORIDE 7.45 MG/ML
10 INJECTION INTRAVENOUS PRN
Status: DISCONTINUED | OUTPATIENT
Start: 2024-12-18 | End: 2024-12-18 | Stop reason: CLARIF

## 2024-12-18 RX ORDER — IPRATROPIUM BROMIDE AND ALBUTEROL SULFATE 2.5; .5 MG/3ML; MG/3ML
3 SOLUTION RESPIRATORY (INHALATION) EVERY 4 HOURS PRN
Qty: 360 ML | Refills: 0 | Status: SHIPPED | OUTPATIENT
Start: 2024-12-18

## 2024-12-18 RX ORDER — POTASSIUM CHLORIDE 1500 MG/1
40 TABLET, EXTENDED RELEASE ORAL PRN
Status: DISCONTINUED | OUTPATIENT
Start: 2024-12-18 | End: 2024-12-18 | Stop reason: CLARIF

## 2024-12-18 RX ORDER — INSULIN LISPRO 100 [IU]/ML
0-8 INJECTION, SOLUTION INTRAVENOUS; SUBCUTANEOUS
Status: DISCONTINUED | OUTPATIENT
Start: 2024-12-18 | End: 2024-12-18 | Stop reason: HOSPADM

## 2024-12-18 RX ADMIN — METOPROLOL TARTRATE 12.5 MG: 25 TABLET, FILM COATED ORAL at 09:51

## 2024-12-18 RX ADMIN — INSULIN LISPRO 2 UNITS: 100 INJECTION, SOLUTION INTRAVENOUS; SUBCUTANEOUS at 12:34

## 2024-12-18 RX ADMIN — PIPERACILLIN AND TAZOBACTAM 3375 MG: 3; .375 INJECTION, POWDER, LYOPHILIZED, FOR SOLUTION INTRAVENOUS at 09:55

## 2024-12-18 RX ADMIN — LEVOTHYROXINE SODIUM 100 MCG: 0.1 TABLET ORAL at 06:19

## 2024-12-18 RX ADMIN — SODIUM CHLORIDE, PRESERVATIVE FREE 10 ML: 5 INJECTION INTRAVENOUS at 15:24

## 2024-12-18 RX ADMIN — LISINOPRIL 5 MG: 10 TABLET ORAL at 09:51

## 2024-12-18 RX ADMIN — PIPERACILLIN AND TAZOBACTAM 3375 MG: 3; .375 INJECTION, POWDER, LYOPHILIZED, FOR SOLUTION INTRAVENOUS at 01:01

## 2024-12-18 RX ADMIN — CYANOCOBALAMIN TAB 1000 MCG 1000 MCG: 1000 TAB at 09:51

## 2024-12-18 RX ADMIN — TIMOLOL MALEATE 1 DROP: 5 SOLUTION OPHTHALMIC at 15:24

## 2024-12-18 RX ADMIN — FUROSEMIDE 40 MG: 10 INJECTION, SOLUTION INTRAMUSCULAR; INTRAVENOUS at 09:51

## 2024-12-18 RX ADMIN — SODIUM BICARBONATE 325 MG: 650 TABLET ORAL at 09:51

## 2024-12-18 ASSESSMENT — PAIN SCALES - GENERAL
PAINLEVEL_OUTOF10: 0
PAINLEVEL_OUTOF10: 0

## 2024-12-18 NOTE — PLAN OF CARE
PHYSICAL THERAPY TREATMENT     Patient: Maggie Lawson (89 y.o. female)  Date: 12/17/2024  Diagnosis: Atrial fibrillation with RVR (HCC) [I48.91]  Pneumonia of right lower lobe due to infectious organism [J18.9]  CAP (community acquired pneumonia) due to group B Streptococcus (HCC) [J15.3] CAP (community acquired pneumonia) due to group B Streptococcus (HCC)      Precautions: Fall Risk, General Precautions, Bed Alarm                      Recommendations for nursing mobility: Encourage HEP in prep for ADLs/mobility; see handout for details, Frequent repositioning to prevent skin breakdown, Use of bed/chair alarm for safety, and Assist x2    In place during session: Peripheral IV and EKG/telemetry   Chart, physical therapy assessment, plan of care and goals were reviewed.  ASSESSMENT  Patient continues with skilled PT services and is progressing towards goals. Pt received semi supine upon PT arrival, agreeable to session. Pt A&O x 1 to name only. (See below for objective details and assist levels). Pt. Requiring mod/max assist for bed mobility. Pt. Sat EOB approx 10 min for sitting balance and upright posture as well as  LE exercises. Pt. Stood with Max assist and RW. Pt. Leaning posteriorly into bed with standing. Pt. Unable to adjust balance and  Unable to weight shift or take any side steps. Pt. Requires Max assist for balance. Pt. Fatigues easily and requires frequent rest breaks.     Overall pt tolerated session fair today with Mobility.  Will continue to benefit from skilled PT services, and will continue to progress as tolerated. Current PT DC recommendation Moderate intensity short-term skilled physical therapy up to 5x/week once medically appropriate.     Start of Session End of Session   SPO2 (%) Too cold fingers    Heart Rate (BPM) 63 66     GOALS:    Problem: Physical Therapy - Adult  Goal: By Discharge: Performs mobility at highest level of function for planned discharge setting.  See evaluation 
         PHYSICAL THERAPY TREATMENT     Patient: Maggie Lawson (89 y.o. female)  Date: 12/18/2024  Diagnosis: Atrial fibrillation with RVR (HCC) [I48.91]  Pneumonia of right lower lobe due to infectious organism [J18.9]  CAP (community acquired pneumonia) due to group B Streptococcus (HCC) [J15.3] CAP (community acquired pneumonia) due to group B Streptococcus (HCC)      Precautions: Fall Risk, General Precautions, Bed Alarm                      Recommendations for nursing mobility: Out of bed to chair for meals, Encourage HEP in prep for ADLs/mobility; see handout for details, Frequent repositioning to prevent skin breakdown, Use of bed/chair alarm for safety, LE elevation for management of edema, Use of BSC for toileting , AD and gt belt for bed to chair , and Assist x2    In place during session: Peripheral IV  Chart, physical therapy assessment, plan of care and goals were reviewed.  ASSESSMENT  Patient continues with skilled PT services and is progressing towards goals. Pt received semi supine upon PT/OT arrival, agreeable to session. Pt A&O x 1. (See below for objective details and assist levels). Improvement noted with mobility this session. Pt. Still requires moderate assist to get to side of the bed and verbal cues. Pt. Stood with Mod A X 2 and RW. Pt. Leaning posteriorly and requiring mod/max assist for maintaining balance as well as vcs for posture. Today, pt. Was able to take a few steps to the chair with RW and Mod assist X 2. Pt. With decreased step length/stride. Pt. Required rest breaks throughout session due to fatigue.    Overall pt tolerated session fair today with Mobility.  Will continue to benefit from skilled PT services, and will continue to progress as tolerated. Current PT DC recommendation Moderate intensity short-term skilled physical therapy up to 5x/week once medically appropriate.     Start of Session End of Session   SPO2 (%) 96 96   Heart Rate (BPM) 59 57     GOALS:    Problem: 
  Problem: Discharge Planning  Goal: Discharge to home or other facility with appropriate resources  12/13/2024 2135 by Maria Guadalupe Luis RN  Outcome: Progressing  12/13/2024 0959 by Elisha Burgess RN  Outcome: Progressing     Problem: Pain  Goal: Verbalizes/displays adequate comfort level or baseline comfort level  12/13/2024 2135 by Maria Guadalupe Luis RN  Outcome: Progressing  12/13/2024 0959 by Elisha Burgess RN  Outcome: Progressing     Problem: Safety - Adult  Goal: Free from fall injury  12/13/2024 2135 by Maria Guadalupe Luis RN  Outcome: Progressing  12/13/2024 0959 by Elisha Burgess RN  Outcome: Progressing     Problem: Skin/Tissue Integrity  Goal: Absence of new skin breakdown  Description: 1.  Monitor for areas of redness and/or skin breakdown  2.  Assess vascular access sites hourly  3.  Every 4-6 hours minimum:  Change oxygen saturation probe site  4.  Every 4-6 hours:  If on nasal continuous positive airway pressure, respiratory therapy assess nares and determine need for appliance change or resting period.  12/13/2024 2135 by Maria Guadalupe Luis RN  Outcome: Progressing  12/13/2024 0959 by Elisha Burgess RN  Outcome: Progressing     Problem: ABCDS Injury Assessment  Goal: Absence of physical injury  12/13/2024 2135 by Maria Guadalupe Luis RN  Outcome: Progressing  12/13/2024 0959 by Elisha Burgess RN  Outcome: Progressing     
  Problem: Discharge Planning  Goal: Discharge to home or other facility with appropriate resources  12/16/2024 0945 by Renae Alvarez, RN  Outcome: Progressing  12/16/2024 0136 by Hermelinda Simpson, RN  Outcome: Progressing  Flowsheets (Taken 12/15/2024 2001)  Discharge to home or other facility with appropriate resources:   Identify barriers to discharge with patient and caregiver   Arrange for needed discharge resources and transportation as appropriate     Problem: Pain  Goal: Verbalizes/displays adequate comfort level or baseline comfort level  Outcome: Progressing     Problem: Safety - Adult  Goal: Free from fall injury  Outcome: Progressing     Problem: Skin/Tissue Integrity  Goal: Absence of new skin breakdown  Description: 1.  Monitor for areas of redness and/or skin breakdown  2.  Assess vascular access sites hourly  3.  Every 4-6 hours minimum:  Change oxygen saturation probe site  4.  Every 4-6 hours:  If on nasal continuous positive airway pressure, respiratory therapy assess nares and determine need for appliance change or resting period.  Outcome: Progressing     Problem: ABCDS Injury Assessment  Goal: Absence of physical injury  Outcome: Progressing     
  Problem: Discharge Planning  Goal: Discharge to home or other facility with appropriate resources  12/16/2024 1420 by Gabbie Espinoza RN  Outcome: Progressing  12/16/2024 0945 by Renae Alvarez RN  Outcome: Progressing  12/16/2024 0136 by Hermelinda Simpson RN  Outcome: Progressing  Flowsheets (Taken 12/15/2024 2001)  Discharge to home or other facility with appropriate resources:   Identify barriers to discharge with patient and caregiver   Arrange for needed discharge resources and transportation as appropriate     Problem: Pain  Goal: Verbalizes/displays adequate comfort level or baseline comfort level  12/16/2024 1420 by Gabbie Espinoza RN  Outcome: Progressing  12/16/2024 0945 by Renae Alvarez RN  Outcome: Progressing     Problem: Safety - Adult  Goal: Free from fall injury  12/16/2024 1420 by Gabbie Espinoza RN  Outcome: Progressing  12/16/2024 0945 by Renae Alvarez RN  Outcome: Progressing     Problem: Skin/Tissue Integrity  Goal: Absence of new skin breakdown  Description: 1.  Monitor for areas of redness and/or skin breakdown  2.  Assess vascular access sites hourly  3.  Every 4-6 hours minimum:  Change oxygen saturation probe site  4.  Every 4-6 hours:  If on nasal continuous positive airway pressure, respiratory therapy assess nares and determine need for appliance change or resting period.  12/16/2024 1420 by Gabbie Espinoza RN  Outcome: Progressing  12/16/2024 0945 by Renae Alvarez RN  Outcome: Progressing     Problem: ABCDS Injury Assessment  Goal: Absence of physical injury  12/16/2024 1420 by Gabbie Espinoza RN  Outcome: Progressing  12/16/2024 0945 by Renae Alvarez RN  Outcome: Progressing     
  Problem: Discharge Planning  Goal: Discharge to home or other facility with appropriate resources  12/17/2024 1303 by Denisse Garcia RN  Outcome: Progressing  12/17/2024 0551 by Loretta Pena RN  Outcome: Progressing     Problem: Pain  Goal: Verbalizes/displays adequate comfort level or baseline comfort level  12/17/2024 1303 by Denisse Garcia RN  Outcome: Progressing  12/17/2024 0551 by Loretta Pena RN  Outcome: Progressing     Problem: Safety - Adult  Goal: Free from fall injury  12/17/2024 1303 by Denisse Garcia RN  Outcome: Progressing  Flowsheets (Taken 12/17/2024 0810)  Free From Fall Injury: Instruct family/caregiver on patient safety  12/17/2024 0551 by Loretta Pena RN  Outcome: Progressing     Problem: Skin/Tissue Integrity  Goal: Absence of new skin breakdown  Description: 1.  Monitor for areas of redness and/or skin breakdown  2.  Assess vascular access sites hourly  3.  Every 4-6 hours minimum:  Change oxygen saturation probe site  4.  Every 4-6 hours:  If on nasal continuous positive airway pressure, respiratory therapy assess nares and determine need for appliance change or resting period.  12/17/2024 1303 by Denisse Garcia RN  Outcome: Progressing  12/17/2024 0551 by Loretta Pena RN  Outcome: Progressing     Problem: ABCDS Injury Assessment  Goal: Absence of physical injury  12/17/2024 1303 by Denisse Garcia RN  Outcome: Progressing  Flowsheets (Taken 12/17/2024 0810)  Absence of Physical Injury: Implement safety measures based on patient assessment  12/17/2024 0551 by Loretta Pena RN  Outcome: Progressing     
  Problem: Discharge Planning  Goal: Discharge to home or other facility with appropriate resources  Outcome: Progressing     Problem: Pain  Goal: Verbalizes/displays adequate comfort level or baseline comfort level  Outcome: Progressing     Problem: Safety - Adult  Goal: Free from fall injury  Outcome: Progressing     Problem: Skin/Tissue Integrity  Goal: Absence of new skin breakdown  Description: 1.  Monitor for areas of redness and/or skin breakdown  2.  Assess vascular access sites hourly  3.  Every 4-6 hours minimum:  Change oxygen saturation probe site  4.  Every 4-6 hours:  If on nasal continuous positive airway pressure, respiratory therapy assess nares and determine need for appliance change or resting period.  Outcome: Progressing     Problem: ABCDS Injury Assessment  Goal: Absence of physical injury  Outcome: Progressing     
OCCUPATIONAL THERAPY TREATMENT  Patient: Maggie Lawson (89 y.o. female)  Date: 12/17/2024  Primary Diagnosis: Atrial fibrillation with RVR (HCC) [I48.91]  Pneumonia of right lower lobe due to infectious organism [J18.9]  CAP (community acquired pneumonia) due to group B Streptococcus (HCC) [J15.3]       Precautions: Fall Risk, General Precautions, Bed Alarm                Recommendations for nursing mobility: Encourage HEP in prep for ADLs/mobility; see handout for details, Frequent repositioning to prevent skin breakdown, Use of BSC for toileting , AD and gt belt for bed to chair , and Assist x1    In place during session: Peripheral IV, External Catheter, and EKG/telemetry   Chart, occupational therapy assessment, plan of care, and goals were reviewed.  ASSESSMENT  Patient continues with skilled OT services and is progressing towards goals. Pt semi supine in bed upon LUCIO arrival, agreeable to session. Pt A&O x 1. Orientation provided w/ poor carry over noted. Pt required min A to don shampoo cap. Pt required extensive extra time to wash, dry and comb hair semi supine in bed w/ min vc's to stay on task. Pt completed UE therex, see grid below for details, to maintain/ increase strength and endurance to aid in adl performance.(See below for objective details and assist levels).     Overall pt tolerated session fair today with rest breaks. Current OT recommendations for discharge Moderate intensity short-term skilled occupational therapy up to 5x/week. Will continue to benefit from skilled OT services, and will continue to progress as tolerated.       GOALS:    Problem: Occupational Therapy - Adult  Goal: By Discharge: Performs self-care activities at highest level of function for planned discharge setting.  See evaluation for individualized goals.  Description: FUNCTIONAL STATUS PRIOR TO ADMISSION:  Dependent for IADLs, supervision to light assist for BADLs per chart review as pt is an unreliable historian     HOME 
OCCUPATIONAL THERAPY TREATMENT  Patient: Maggie Lawson (89 y.o. female)  Date: 12/18/2024  Primary Diagnosis: Atrial fibrillation with RVR (HCC) [I48.91]  Pneumonia of right lower lobe due to infectious organism [J18.9]  CAP (community acquired pneumonia) due to group B Streptococcus (HCC) [J15.3]       Precautions: Fall Risk, General Precautions, Bed Alarm                Recommendations for nursing mobility: Out of bed to chair for meals, Encourage HEP in prep for ADLs/mobility; see handout for details, Frequent repositioning to prevent skin breakdown, Use of BSC for toileting , AD and gt belt for bed to chair , and Assist x2    In place during session: Peripheral IV, External Catheter, and EKG/telemetry   Chart, occupational therapy assessment, plan of care, and goals were reviewed.  ASSESSMENT  Patient continues with skilled OT services and is progressing towards goals. Pt seated eob w/ PTA upon LUCIO arrival, agreeable to session. Pt A&O x 1. Pt required Mod a x 2 for sts and t/f to recliner using rw.  Posterior lean noted and required mod/max A and vc's to maintain balance.Pt able to take several step to recliner w/ extensive assist. Pt completed UE therex, see grid below for details, to maintain/ increase strength and endurance to aid in adl performance. Pt required set up of meal tray including butter toast and opening all containers. Education provided on HEP w/ pt verbalizing poor understanding. Pt left seated in recliner w/ chair alarm on and rn aware of pt being up with all known needs met. (See below for objective details and assist levels).     Overall pt tolerated session fair/poor today with rest breaks. Current OT recommendations for discharge Moderate intensity short-term skilled occupational therapy up to 5x/week. Will continue to benefit from skilled OT services, and will continue to progress as tolerated.      Start of Session End of Session   SPO2 (%) 96 96   Heart Rate (BPM) 59 57 
and determine need for appliance change or resting period.  Outcome: Progressing     Problem: ABCDS Injury Assessment  Goal: Absence of physical injury  Outcome: Progressing     
patient attempted five times with improved technique with each trial. Afterwards she sat EOB for 5 minutes with good core control prior to returning to supine. Pt will benefit from continued skilled PT to address above deficits and return to PLOF. Current PT DC recommendation Moderate intensity short-term skilled physical therapy up to 5x/week once medically appropriate.    GOALS:    Problem: Physical Therapy - Adult  Goal: By Discharge: Performs mobility at highest level of function for planned discharge setting.  See evaluation for individualized goals.  Description: FUNCTIONAL STATUS PRIOR TO ADMISSION: Patient unable to verbalize prior level of function however she stated she was \"having difficulty with it (walking) recently\".    HOME SUPPORT PRIOR TO ADMISSION: Per chart review, pt was a LTC [facility] at [facility].    Physical Therapy Goals  Initiated 12/14/2024  Pt stated goal: to get up and walk  Pt will be I with LE HEP in 7 days.  Pt will perform bed mobility with Contact Guard Assist in 7 days.  Pt will perform transfers with Contact Guard Assist in 7 days.   Pt will demonstrate improvement in standing balance from poor to fair in 7 days.  Further goals will be added as patient progresses with therapy.  Outcome: Progressing        PLAN :  Recommendations and Planned Interventions: bed mobility training, transfer training, gait training, therapeutic exercises, neuromuscular re-education, patient and family training/education, and therapeutic activities    Recommend next PT session: EOB transfers, seated static and dynamic balance, standing static balance, ambulation with AD , and LE therex    Frequency/Duration: Patient will be followed by physical therapy:  2-3x/week to address goals.    Recommendation for discharge: (in order for the patient to meet his/her long term goals)  Moderate intensity short-term skilled physical therapy up to 5x/week     Potential barriers for safe discharge: 
Grooming: Minimal assistance in supported long sitting hand hygiene     Functional Measure:    [facility] AM-PACTM \"6 Clicks\"                                                       Daily Activity Inpatient Short Form  How much help from another person does the patient currently need... Total; A Lot A Little None   1.  Putting on and taking off regular lower body clothing? [x]  1 []  2 []  3 []  4   2.  Bathing (including washing, rinsing, drying)? []  1 [x]  2 []  3 []  4   3.  Toileting, which includes using toilet, bedpan or urinal? [] 1 [x]  2 []  3 []  4   4.  Putting on and taking off regular upper body clothing? []  1 []  2 [x]  3 []  4   5.  Taking care of personal grooming such as brushing teeth? []  1 []  2 [x]  3 []  4   6.  Eating meals? []  1 []  2 [x]  3 []  4   © 2007, Trustees of [facility], under license to [employer]. All rights reserved     Score: 14/24     Interpretation of Tool:  Represents clinically-significant functional categories (i.e. Activities of daily living).  Percentage of Impairment CH    0%   CI    1-19% CJ    20-39% CK    40-59% CL    60-79% CM    80-99% CN     100%   AMPA  Score 6-24 24 23 20-22 15-19 10-14 7-9 6     Occupational Therapy Evaluation Charge Determination   History Examination Decision-Making   LOW Complexity : Brief history review  LOW Complexity: 1-3 Performance deficits relating to physical, cognitive, or psychosocial skills that result in activity limitations and/or participation restrictions LOW Complexity: No comorbidities that affect functional and  no verbal  or physical assist needed to complete eval tasks      Based on the above components, the patient evaluation is determined to be of the following complexity level: Low    Activity Tolerance:   Poor    After treatment patient left in no apparent distress:    Patient left in no apparent distress in bed and placed in chair position., Call bell within reach, Bed/ chair alarm activated, and Side

## 2024-12-18 NOTE — ACP (ADVANCE CARE PLANNING)
Primary Decision Maker: Beverly Banegas - [place] - [phone]    Secondary Decision Maker: Arden Green Jr    Pt has AMD on file dated 5/29/2024 which appoints Beverly Banegas and Arden Green Jr as primary and secondary Medical POAs in respective order.

## 2024-12-18 NOTE — DISCHARGE SUMMARY
Physician Discharge Summary     Patient ID:    Maggie Lawson  [id number]  89 y.o.  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  Patient at baseline is very independent.  Patient's altered mental status has been going on for several weeks before presentation and but got worse over the past 2 days with patient refusing her medications.  The staff at her assisted living in [place] was concerned for UTI but UA was negative.  Patient is a poor historian and presentation in the ED.  Chest x-ray revealed right lower lobe lung infiltrate.  Patient was started on IV antibiotics in the ER and admitted to continue the treatment to general medicine floor.  Patient was also noted to have tachycardia with A-fib.  She does report endorsing multiple falls over the summer.  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  Teleneurology was consulted.  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  Brain MRI does show multiple

## 2024-12-18 NOTE — CARE COORDINATION
DC Plan: [facility]       Report: [phone]       Transportation: [facility] 4:30pm    Cm called [facility] and arranged transport. Cm received ETA for 4:30pm.     Cm spoke with pt's POA and notified her of discharge and transport time.     Nurse is aware of dc.     Transition of Care Plan:    RUR: 15%  Prior Level of Functioning: needs assistance  Disposition: IRF  LEE ANN: 12/18/24  If SNF or IPR: Date FOC offered: 12/16  Date FOC received: 12/16  Accepting facility: [facility]  Date authorization started with reference number: N/A  Date authorization received and expires: N/A  Follow up appointments: None  DME needed: None  Transportation at discharge: stretcher  IM/IMM Medicare/ letter given: Yes  Is patient a  and connected with [facility]? N/A   If yes, was [place] transfer form completed and [facility] notified?   Caregiver Contact: POA  Discharge Caregiver contacted prior to discharge? Yes  Care Conference needed? No  Barriers to discharge: None

## 2024-12-18 NOTE — DISCHARGE INSTR - COC
Continuity of Care Form    Patient Name: Maggie Lawson   :  1931  MRN:  [id number]    Admit date:  2024  Discharge date:  24    Code Status Order: DNR   Advance Directives:   Advance Care Flowsheet Documentation             Admitting Physician:  Albin Rios MD  PCP: Julisa Sandhu MD    Discharging Nurse: Iwona Edgar RN  Discharging Hospital Unit/Room#: 569/01  Discharging Unit Phone Number:     Emergency Contact:   Extended Emergency Contact Information  Primary Emergency Contact: Lawanda Banegas ( POA)  Home Phone: [phone]  Mobile Phone: [phone]  Relation: Friend    Past Surgical History:  Past Surgical History:   Procedure Laterality Date    BREAST LUMPECTOMY      HYSTERECTOMY (CERVIX STATUS UNKNOWN)         Immunization History:   Immunization History   Administered Date(s) Administered    TDaP, ADACEL (age 10y-64y), BOOSTRIX (age 10y+), IM, 0.5mL 2021, 2022       Active Problems:  Patient Active Problem List   Diagnosis Code    Ankle wound, right, subsequent encounter S91.001D    Traumatic open wound of left lower leg with delayed healing S81.802D    Venous ulcer of lower leg without varicose veins (HCC) I87.2, L97.909    CAP (community acquired pneumonia) due to group B Streptococcus (HCC) J15.3    Palliative care by specialist Z51.5    DNR (do not resuscitate) discussion Z71.89    ACP (advance care planning) Z71.89    Atrial fibrillation with RVR (HCC) I48.91    Encephalopathy G93.40       Isolation/Infection:   Isolation            No Isolation          Patient Infection Status       None to display                     Nurse Assessment:  Last Vital Signs: BP (!) 107/56   Pulse 53   Temp 97.9 °F (36.6 °C) (Oral)   Resp 14   Ht 1.6 m (5' 3\")   Wt 54.3 kg (119 lb 11.4 oz)   SpO2 98%   BMI 21.21 kg/m²     Last documented pain score (0-10 scale): Pain Level: 0  Last Weight:   Wt Readings from Last 1 Encounters:   24 54.3 kg (119 lb 11.4 oz)

## 2024-12-18 NOTE — PROGRESS NOTES
Progress Note      12/18/2024 12:43 PM  NAME: Maggie Lawson   MRN:  [id number]   Admit Diagnosis: Atrial fibrillation with RVR (HCC) [I48.91]  Pneumonia of right lower lobe due to infectious organism [J18.9]  CAP (community acquired pneumonia) due to group B Streptococcus (HCC) [J15.3]      Problem List:   Paroxysmal A-fib, good rate control  Acute pneumonia  Chronic CAD  Chronic CHF  LV dysfunction with EF 35-40%     Assessment/Plan:   GDMT  Appreciate palliative medicine consultation  Cards team will follow as needed         []       High complexity decision making was performed in this patient at high risk for decompensation with multiple organ involvement.    Subjective:     Maggie Lawson denies chest pain, dyspnea.  Discussed with RN events overnight.     Review of Systems:   Negative except for as noted above.    Objective:      Physical Exam:    Last 24hrs VS reviewed since prior progress note. Most recent are:    /65   Pulse 65   Temp 97.5 °F (36.4 °C) (Axillary)   Resp 18   Ht 1.6 m (5' 3\")   Wt 54.3 kg (119 lb 11.4 oz)   SpO2 93%   BMI 21.21 kg/m²     Intake/Output Summary (Last 24 hours) at 12/18/2024 1243  Last data filed at 12/18/2024 0525  Gross per 24 hour   Intake --   Output 250 ml   Net -250 ml        General Appearance: Alert; no acute distress.  Ears/Nose/Mouth/Throat: moist mucous membranes  Neck: Supple.  Chest: Lungs clear to auscultation bilaterally.  Cardiovascular: Regular rate and rhythm, S1S2 normal  Abdomen: Soft, non-tender, bowel sounds are active.  Extremities: No edema bilaterally.  Skin: Warm and dry.      PMH/SH reviewed - no change compared to H&P    Telemetry:     EKG:     No valid procedures specified.    No results found for this or any previous visit.    []  No new EKG for review    Lab Data Personally Reviewed:    Recent Labs     12/16/24  0259 12/17/24  0554   WBC 13.0* 9.6   HGB 14.0 11.6   HCT 43.6 34.8*    281     No results for input(s): \"INR\", 
     Palliative Medicine Social Work Note      Pall[facility] Henri[facility] made supportive visit to pt; no family present.  Pt was sitting up in chair, engaged in conversation, did not recall having friends visit yesterday.  Pt was made aware of plan for rehab, stated she has no opinion re: where she goes when she leaves the hospital.  Pt declined offer for tv or music, stated she does not enjoy \"forced entertainment.\"  No needs expressed at this time.  Will be available for support as needed.     Thank you for including Palliative Medicine in the care of Ms. Lawson.     Fina Blanco, TOMMIE, [facility]  Palliative Medicine:  [phone])  
    Hospitalist Progress Note               Daily Progress Note: 12/13/2024      Hospital Day: 2     Chief complaint:   Chief Complaint   Patient presents with    Irregular Heart Beat        Brief HPI/ Hospital course to date:  Maggie Lawson is a 89 y.o. female presents with altered mental status.  This has been ongoing for several weeks but had gotten worse over the past 2 days with patient refusing her medications.  Staff was concerned with UTI but UA was negative.  She is a poor historian for which the history was obtained from the ED staff and chart.  She has no complaints.  In the ED, patient was afebrile and vitals were stable except for tachycardia 147.  She was found to be in new onset A-fib with RVR.  Patient was given adult bolus and 30 mg tablet.  Due to bradycardia in 30s, patient was switched to Lopressor.  Otherwise, BMP was fairly unremarkable.  Pro-Milton was negative.  ALP was elevated at 212 along with AST at 70.  TSH was found to be low with normal T4.  CBC was unremarkable.  D-dimer was negative.  Blood cultures were obtained.  CXR showed right lower lobe infiltrate.  Patient was started on Rocephin and azithromycin..    --------  Patient is seen today for follow-up.   She has no complaints today.  Pleasantly confused.  She is able to tell me her name.  Denies headache, chest pain/palpitations, shortness of breath, abdominal pain, urinary symptoms.      All ROS negative otherwise mentioned above.      Assessment and Plan:    Acute encephalopathy, could be in setting of pneumonia and underlying dementia  -Patient presented with worsening confusion refusing medications  -She is AO x 2 today  -UA pending  -Consider CT head if worsening confusion.  At this time, she is able to follow commands  -CXR: Right lower lobe infiltrate  -Will continue Rocephin and azithromycin    Acute respiratory failure with hypoxia, likely in setting of pneumonia  -Patient found to be hypoxic at 88 and placed on 2 L  -CXR with 
    Hospitalist Progress Note               Daily Progress Note: 12/15/2024      Hospital Day: 4     Chief complaint:   Chief Complaint   Patient presents with    Irregular Heart Beat        Brief HPI/ Hospital course to date:  Maggie Lawson is a 89 y.o. female presents with altered mental status.  This has been ongoing for several weeks but had gotten worse over the past 2 days with patient refusing her medications.  Staff was concerned with UTI but UA was negative.  She is a poor historian for which the history was obtained from the ED staff and chart.  She has no complaints.  In the ED, patient was afebrile and vitals were stable except for tachycardia 147.  She was found to be in new onset A-fib with RVR.  Patient was given adult bolus and 30 mg tablet.  Due to bradycardia in 30s, patient was switched to Lopressor.  Otherwise, BMP was fairly unremarkable.  Pro-Milton was negative.  ALP was elevated at 212 along with AST at 70.  TSH was found to be low with normal T4.  CBC was unremarkable.  D-dimer was negative.  Blood cultures were obtained.  CXR showed right lower lobe infiltrate.  Patient was started on Rocephin and azithromycin.    --------  Patient is seen today for follow-up.   She has no complaints today.  Pleasantly confused.  Today, nursing states patient was not taking her medications or eating much.     Neurological exam at the time was benign.  Denied headache, chest pain/palpitations, shortness of breath, abdominal pain, urinary symptoms.    CT head stat was ordered.  mPOA at bedside states that it is normal for patient not to eat at times or take her medications.    All ROS negative otherwise mentioned above.      Assessment and Plan:    Acute encephalopathy, could be in setting of pneumonia and underlying dementia and now new SAG  -Patient presented with worsening confusion refusing medications  -She is AO x 2 today  -CT head with SAH  -CXR: Right lower lobe infiltrate  -Will continue Rocephin and 
    Hospitalist Progress Note               Daily Progress Note: 12/16/2024      Hospital Day: 5     Chief complaint:   Chief Complaint   Patient presents with    Irregular Heart Beat        Brief HPI/ Hospital course to date:  Maggie Lawson is a 89 y.o. female presents with altered mental status.  This has been ongoing for several weeks but had gotten worse over the past 2 days with patient refusing her medications.  Staff was concerned with UTI but UA was negative.  She is a poor historian for which the history was obtained from the ED staff and chart.  She has no complaints.  In the ED, patient was afebrile and vitals were stable except for tachycardia 147.  She was found to be in new onset A-fib with RVR.  Patient was given adult bolus and 30 mg tablet.  Due to bradycardia in 30s, patient was switched to Lopressor.  Otherwise, BMP was fairly unremarkable.  Pro-Milton was negative.  ALP was elevated at 212 along with AST at 70.  TSH was found to be low with normal T4.  CBC was unremarkable.  D-dimer was negative.  Blood cultures were obtained.  CXR showed right lower lobe infiltrate.  Patient was started on Rocephin and azithromycin.  However, leukocytosis worsened.  Patient was escalated to Zosyn.  Had some decreased appetite not taking meds.  CT head showed subarachnoid hemorrhage.  MRI showed some amyloid pattern as well as SAH.  Teleneurology was consulted and no further recommendations.  Otherwise, cardiology following and recommend no anticoagulation agree with beta-blocker.  Palliative care consulted and patient made DNR.  Currently pending PT/OT and [facility]    --------  Patient is seen today for follow-up.   She has no complaints today.  Feels and appears to be in an irritated mood.  Nursing states that she was able x 2 on their exam.  Refusing to answer questions for me as she had just answer them.  Neurological exam at the time was benign.  Denied headache, chest 
  Physician Progress Note      PATIENT:               WEI HAWK  CSN #:                  [id number]  :                       1931  ADMIT DATE:       2024 6:10 AM  DISCH DATE:  RESPONDING  PROVIDER #:        Oneida Le MD        QUERY TEXT:    Type of Encephalopathy: Please provide further specificity, if known.    Clinical indicators include: altered mental status, acute, encephalopathy,   volume overloaded, sepsis, fevers, hypoglycemic, volume overload  Options provided:  -- Anoxic/hypoxic encephalopathy  -- Metabolic encephalopathy  -- Toxic encephalopathy  -- Hepatic encephalopathy  -- Hypertensive encephalopathy  -- Other - I will add my own diagnosis  -- Disagree - Not applicable / Not valid  -- Disagree - Clinically Unable to determine / Unknown        PROVIDER RESPONSE TEXT:    The patient has anoxic/hypoxic encephalopathy.      Electronically signed by:  Oneida Le MD 2024 10:49 AM          
0702: Bedside and Verbal shift change report given to Shobha Townsend RN (oncoming nurse) by Hermelinda Simpson RN (offgoing nurse). Report included the following information Nurse Handoff Report, Index, ED Encounter Summary, Adult Overview, Intake/Output, MAR, Recent Results, Cardiac Rhythm sinus arrhythmia/ SB , Alarm Parameters, Quality Measures, and Neuro Assessment.     0749: EKG tech at the bedside to perform EKG.     0758: Spoke to lab and they will call phlebotomist to ensure labs get drawn this morning. Labs are pending discharge. Notified Oneida Le MD.     0800: Assessment completed. Pt repositioned in bed.     0911: Ordered labs stat as no phlebotomist has drawn labs yet. Spoke to lab and they advised they'll call phlebotomy and ensure this gets done. Advised pt is awake.     0920: Notified Oneida Le MD that pt is refusing to take her pills and wear her oxygen. On room air, currently satting 86% and pt is refusing to put on her nasal cannula.     0941: Oneida Le MD at the bedside to examine pt. Now that patient is calm, her sats are 94% on room air.     1000: Pt repositioned in bed.     1024: Labs collected by phlebotomy.     1103: Notified Oneida Le MD that WBC is 15.6 from 12.6. No fevers. MD will cancel discharge and order CXR.     1200: Reassessment completed. Pt repositioned in bed. CXR completed.     1400: Pt repositioned in bed. Notified Oneida Le MD that CXR results are available and looks like PNA. Advised pt is refusing to eat breakfast and lunch. Advised pt's POA is at the bedside and would like an update.     1434: Pt transported in stable condition to CT.     1452: Pt transported in stable condition to 4W from CT.     1600: Reassessment completed. Pt repositioned in bed.     1627: Oneida Le MD advised she called tele neurology and they placed pt on rounding list tomorrow and no one is on for today.     1713: Pt transported in stable condition to CT. Telemetry notified. 
4 Eyes Skin Assessment     NAME:  Maggie Lawson  YOB: 1931  MEDICAL RECORD NUMBER:  [id number]    The patient is being assessed for  Admission    I agree that at least one RN has performed a thorough Head to Toe Skin Assessment on the patient. ALL assessment sites listed below have been assessed.      Areas assessed by both nurses:    Head, Face, Ears, Shoulders, Back, Chest, Arms, Elbows, Hands, Sacrum. Buttock, Coccyx, Ischium, Legs. Feet and Heels, and Under Medical Devices         Does the Patient have a Wound? No noted wound(s)       Ernesto Prevention initiated by RN: Yes  Wound Care Orders initiated by RN: No    Pressure Injury (Stage 3,4, Unstageable, DTI, NWPT, and Complex wounds) if present, place Wound referral order by RN under : No    New Ostomies, if present place, Ostomy referral order under : No     Nurse 1 eSignature: Electronically signed by Elisha Burgess RN on 12/12/24 at 7:01 PM EST    **SHARE this note so that the co-signing nurse can place an eSignature**    Nurse 2 eSignature: Electronically signed by Leanna Adam RN on 12/12/24 at 7:03 PM EST   
Echo completed. Report to follow.    
Hold lasix for now per dr. Tanner BP.  
Patient transported to [facility] via stretcher. On room air, no distress noted. Friend, poa at bedside  and [facility]  
Received Order for Telemetry     Maggie Lawson   6/9/1931   [id number]   Atrial fibrillation with RVR (HCC) [I48.91]  Pneumonia of right lower lobe due to infectious organism [J18.9]  CAP (community acquired pneumonia) due to group B Streptococcus (HCC) [J15.3]   Albin Rios MD     Tele Box # [id number] placed on patient at  1559 pm  ER Room # 2  Admitting to Room 484  Verified with Primary Nurse raheem  at  1623 pm    
Report called and given to Gabbie on 5W. Patient transferred to room 569 on tele box 51. Personal belongings at bedside.   
Report called to Natan at [facility] rehab  
The patient refused her crushed due meds in apple sauce after taking just one spoonful citing that she has given too much meds since she got here.  
pain/palpitations, shortness of breath, abdominal pain, urinary symptoms.    All ROS negative otherwise mentioned above.      Assessment and Plan:    Acute encephalopathy, could be in setting of pneumonia and underlying dementia and now new SAG  -Patient presented with worsening confusion refusing medications  -She is AO x 2 today  -CT head with SAH  -CXR: Right lower lobe infiltrate  -On Zosyn.  See below    SAH  -CT head 12/15: Small left frontal acute subarachnoid hemorrhage.    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  At the same time, patient has had decreased appetite for quite some time and may benefit from hospice.    Acute respiratory failure with hypoxia, likely in setting of pneumonia, and fluid overload  -Patient found to be hypoxic at 88 and placed on 2 L, was on room air and back to 2 L  -CXR with pneumonia  -D-dimer negative  -Does not appear to be volume overloaded  -COVID and Flu negative  -Continue antibiotics as above  -Repeat CXR today shows increased perihilar opacities which could be progression of multifocal pneumonia versus increased pulmonary edema  -Stop fluids.  Lasix 40 daily as BPs tolerate  -Wean O2 as tolerated      Sepsis secondary to pneumonia  -Hypotensive, tachycardic, hypoxic, hypothermic on admission  -Leukocytosis increasing  -Lactic acid WNL  -CXR on 
  ---------------------------------------------------------------------  B. Risk of Treatment (any 1)   [] Drugs/treatments that require intensive monitoring for toxicity include:    [] IV ABX requiring serial renal monitoring for nephrotoxicity:     [] IV Narcotic analgesia for adverse drug reaction  [] Aggressive IV diuresis requiring serial monitoring for renal impairment and electrolyte derangements  [] Critical electrolyte abnormalities requiring IV replacement and close serial monitoring  [] SQ Insulin SS- monitoring serial FSBS for Hypoglycemic adverse drug reaction  [] Other -   [] Change in code status:    [] Decision to escalate care:    [] Major surgery/procedure with associated risk factors:    ----------------------------------------------------------------------  C. Data (any 2)  [x] Discussed current management and discharge planning options with Case Management.  [] Discussed management of the case with:    [x] Telemetry personally reviewed and interpreted as documented above    [] Imaging personally reviewed and interpreted, includes:    [x] Data Review (any 3)  [x] All available Consultant notes from yesterday/today were reviewed  [x] All current labs were reviewed and interpreted for clinical significance   [x] Appropriate follow-up labs were ordered  [] Collateral history obtained from:       Time spent with patient including counseling, chart review and nursing communication: 38 minutes    Oneida Le MD

## 2024-12-18 NOTE — PALLIATIVE CARE DISCHARGE
Goals of Care/Treatment Preferences    The Palliative Medicine team was consulted as part of your/your loved one's care in the hospital. Our team is a supportive service; we strive to relieve suffering and improve quality of life.    We reviewed advance care planning information, which includes the following:    Primary Decision Maker: Beverly Banegas - [phone]    Secondary Decision Maker: Arden Green Jr  Patient's Healthcare Decision Maker is:: Named in Scanned ACP Document  Confirm Advance Directive: Yes, on file    Patient/Health Care Proxy Stated Goals: Recovery from acute illness    We reviewed / discussed your code status as:   Code Status: DNR     “Full Code” means perform CPR in the event of cardiac arrest.      “DNR” means do NOT perform CPR in the event of cardiac arrest.      “Partial Code” means you have specific preferences, please discuss with your healthcare team.      “No Order” means this issue was not addressed / resolved during your stay         Because of the importance of this information, we are providing you with a printed copy to share with other healthcare providers after this hospitalization is complete.             none

## 2024-12-18 NOTE — CONSULTS
Consult    NAME: Maggie Lawson   :  1931   MRN:  [id number]     Date/Time:  2024 1:48 PM    Patient PCP: Julisa Sandhu MD  ________________________________________________________________________        Assessment:     Patient is out of the floor and I will evaluate her later.        Plan:           []        High complexity decision making was performed    Vinnie Gates MD  
         Consult    NAME: Maggie Lawson   :  1931   MRN:  [id number]     Date/Time:  2024 3:50 PM    Patient PCP: Julisa Sandhu MD  ________________________________________________________________________        Assessment:     Patient is very pleasant but confused.  She mentioned that she is too tired and do not want to talk to anybody today and would not want me to examine her.        Plan:           []        High complexity decision making was performed    Vinnie Gates MD  
Palliative Medicine  Patient Name: Maggie Lawson  YOB: 1931  MRN: [id number]  Age: 89 y.o.  Gender: female    Date of Initial Consult: 12/16/24  Date of Service: 12/18/2024  Time: 5:08 PM  Provider: Praveen Henriquez MD  Hospital Day: 7  Admit Date: 12/12/2024  Referring Provider: Dr. Le       Reasons for Consultation:  Goals of Care    HISTORY OF PRESENT ILLNESS (HPI):   Maggie Lawson is a 89 y.o. female with a past medical history of dementia, diabetes, hypertension, who was admitted on [facility]/12/20[facility] from [facility] [place] with a diagnosis of altered mental status, atrial fibrillation, pneumonia.     Patient is a 89-year-old female who had been very independent up until the summer.  She apparently had multiple falls over the summer and the home that she had been living in-her home from her childhood had black mold.  Her friend finally convinced her to transition to assisted living in [place].  She continues to have some evidence of decline and ultimately was brought to the hospital secondary to altered mental status, pneumonia and imaging is concerning for subarachnoid hemorrhage along with amyloid angiopathy.  Patient being treated with antibiotics but according to her friend-has had a very significant decline over the last month.  Our team has been asked to see her for goals of care    Psychosocial: Patient never , no children, and was very independent throughout her life.  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  She is lived in [facility].  She did have a brother who passed of dementia.  Her friend-Beverly Banegas who lives on the same street serves as her medical POA      PALLIATIVE DIAGNOSES:    Palliative Medicine consultation  DNR discussion  SAH  ACP review  Dementia    ASSESSMENT AND PLAN:   Chart reviewed thoroughly prior to 
Palliative Medicine  Patient Name: Maggie Lawson  YOB: 1931  MRN: [id number]  Age: 89 y.o.  Gender: female    Date of Initial Consult: 12/16/24  Date of Service: 12/16/2024  Time: 3:20 PM  Provider: Praveen Henriquez MD  Hospital Day: 5  Admit Date: 12/12/2024  Referring Provider: Dr. Le       Reasons for Consultation:  Goals of Care    HISTORY OF PRESENT ILLNESS (HPI):   Maggie Lawson is a 89 y.o. female with a past medical history of dementia, diabetes, hypertension, who was admitted on 12/12/2024 from [facility] in [place] with a diagnosis of altered mental status, atrial fibrillation, pneumonia.     Patient is a 89-year-old female who had been very independent up until the summer.  She apparently had multiple falls over the summer and the home that she had been living in-her home from her childhood had black mold.  Her friend finally convinced her to transition to assisted living in [place].  She continues to have some evidence of decline and ultimately was brought to the hospital secondary to altered mental status, pneumonia and imaging is concerning for subarachnoid hemorrhage along with amyloid angiopathy.  Patient being treated with antibiotics but according to her friend-has had a very significant decline over the last month.  Our team has been asked to see her for goals of care    Psychosocial: Patient never , no children, and was very independent throughout her life.  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  She is lived in [facility].  She did have a brother who passed of dementia.  Her friend-Beverly Banegas who lives on the same street serves as her medical POA      PALLIATIVE DIAGNOSES:    Palliative Medicine consultation  DNR discussion  SAH  ACP review  Dementia    ASSESSMENT AND PLAN:   Chart reviewed thoroughly prior to 
management of atrial fibrillation with RVR.  The patient at the time of my evaluation was pleasantly confused and unable to give any meaningful clinical history.  Patient did not appear in any distress from chest pain, dyspnea or heart racing.    Past medical, Family & Social History   The following medical history was reviewed    Past Medical History:   Past Medical History:   Diagnosis Date    CAD (coronary artery disease)     High cholesterol     High cholesterol     HTN (hypertension)     Hypertension     Hypothyroidism     Hypothyroidism     PVD (peripheral vascular disease) (HCC)     Subarachnoid hemorrhage following injury     TIA (transient ischemic attack)     Venous thrombosis 10/04/2024    left thigh      Past Surgical History:   Procedure Laterality Date    BREAST LUMPECTOMY      HYSTERECTOMY (CERVIX STATUS UNKNOWN)         Social History:  Social History     Socioeconomic History    Marital status: Single     Spouse name: Not on file    Number of children: Not on file    Years of education: Not on file    Highest education level: Not on file   Occupational History    Not on file   Tobacco Use    Smoking status: Former    Smokeless tobacco: Never   Vaping Use    Vaping status: Never Used   Substance and Sexual Activity    Alcohol use: Yes    Drug use: Never    Sexual activity: Not on file   Other Topics Concern    Not on file   Social History Narrative    Not on file     Social Determinants of Health     Financial Resource Strain: Not on file   Food Insecurity: No Food Insecurity (12/12/2024)    Hunger Vital Sign     Worried About Running Out of Food in the Last Year: Never true     Ran Out of Food in the Last Year: Never true   Transportation Needs: No Transportation Needs (12/12/2024)    PRAPARE - Transportation     Lack of Transportation (Medical): No     Lack of Transportation (Non-Medical): No   Recent Concern: Transportation Needs - Unmet Transportation Needs (10/7/2024)    Received from [facility] 
ischemic attack)     Venous thrombosis 10/04/2024    left thigh      PSH:   Past Surgical History:   Procedure Laterality Date    BREAST LUMPECTOMY      HYSTERECTOMY (CERVIX STATUS UNKNOWN)        Allergies: No Known Allergies   FH:   Family History   Problem Relation Age of Onset    Heart Disease Mother     Cancer Father       SH:   Social History     Tobacco Use    Smoking status: Former    Smokeless tobacco: Never   Vaping Use    Vaping status: Never Used   Substance Use Topics    Alcohol use: Yes    Drug use: Never      Meds:   Current Facility-Administered Medications   Medication Dose Route Frequency Provider Last Rate Last Admin    sodium chloride flush 0.9 % injection 5-40 mL  5-40 mL IntraVENous 2 times per day Oneida Le MD        sodium chloride flush 0.9 % injection 5-40 mL  5-40 mL IntraVENous PRN Oneida Le MD        0.9 % sodium chloride infusion   IntraVENous PRN Oneida Le MD        polyethylene glycol (GLYCOLAX) packet 17 g  17 g Oral Daily PRN Oneida Le MD        atorvastatin (LIPITOR) tablet 80 mg  80 mg Oral Nightly Oneida Le MD   80 mg at 12/15/24 2014    labetalol (NORMODYNE;TRANDATE) injection 10 mg  10 mg IntraVENous Q10 Min PRN Oneida Le MD        0.9 % sodium chloride infusion   IntraVENous Continuous Oneida Le MD 50 mL/hr at 12/15/24 1837 New Bag at 12/15/24 1837    piperacillin-tazobactam (ZOSYN) 3,375 mg in sodium chloride 0.9 % 50 mL IVPB (mini-bag)  3,375 mg IntraVENous Q8H Oneida Le MD 12.5 mL/hr at 12/16/24 0933 3,375 mg at 12/16/24 0933    ipratropium 0.5 mg-albuterol 2.5 mg (DUONEB) nebulizer solution 1 Dose  1 Dose Inhalation Q4H PRN Vinnie Gates MD        sodium bicarbonate tablet 325 mg  325 mg Oral BID Oneida Le MD   325 mg at 12/16/24 0920    metoprolol tartrate (LOPRESSOR) tablet 12.5 mg  12.5 mg Oral BID Oneida Le MD   12.5 mg at 12/16/24 0920    levothyroxine (SYNTHROID) tablet 100 mcg  100 mcg Oral Daily Oneida Le MD 
(116 lb)   05/15/23 53.5 kg (118 lb)   04/03/23 53.5 kg (118 lb)        Current Diet: ADULT DIET; Easy to Chew       PSYCHOSOCIAL/SPIRITUAL SCREENING:   Palliative IDT has assessed this patient for cultural preferences / practices and a referral made as appropriate to needs (Cultural Services, Patient Advocacy, Ethics, etc.)    Spiritual Affiliation: Other    Any spiritual / Congregation concerns:  [] Yes /  [x] No   If \"Yes\" to discuss with pastoral care during IDT     Does caregiver feel burdened by caring for their loved one:   [] Yes /  [x] No /  [] No Caregiver Present/Available [] No Caregiver [] Pt Lives at Facility  If \"Yes\" to discuss with social work during IDT    Anticipatory grief assessment:   [x] Normal  / [] Maladaptive     If \"Maladaptive\" to discuss with social work during IDT    ESAS Anxiety:      ESAS Depression:          LAB AND IMAGING FINDINGS:   Objective data reviewed:  reviewed, labs, images, records, medication use, vitals, and chart     FINAL COMMENTS   Thank you for allowing Palliative Medicine to participate in the care of Maggie Lawson.    Only check if applicable and billing time based rather than MDM  [x] The total encounter time on this service date was _25__ minutes which was spent performing a face-to-face encounter and personally completing the provider-level activities documented in the note. This includes time spent prior to the visit and after the visit in direct care of the patient. This time does not include time spent in any separately reportable services.    Electronically signed by   Praveen Henriquez MD  Palliative Care Team  on 12/17/2024 at 1:36 PM

## 2024-12-21 ENCOUNTER — HOSPITAL ENCOUNTER (OUTPATIENT)
Facility: HOSPITAL | Age: 88
Setting detail: SPECIMEN
Discharge: HOME | End: 2024-12-24

## 2024-12-21 LAB
ALBUMIN SERPL-MCNC: 2.5 G/DL (ref 3.5–5)
ALBUMIN/GLOB SERPL: 0.8 (ref 1.1–2.2)
ALP SERPL-CCNC: 175 U/L (ref 45–117)
ALT SERPL-CCNC: 118 U/L (ref 12–78)
ANION GAP SERPL CALC-SCNC: 6 MMOL/L (ref 2–12)
AST SERPL W P-5'-P-CCNC: 80 U/L (ref 15–37)
BILIRUB SERPL-MCNC: 0.5 MG/DL (ref 0.2–1)
BUN SERPL-MCNC: 12 MG/DL (ref 6–20)
BUN/CREAT SERPL: 13 (ref 12–20)
CA-I BLD-MCNC: 8.9 MG/DL (ref 8.5–10.1)
CHLORIDE SERPL-SCNC: 103 MMOL/L (ref 97–108)
CO2 SERPL-SCNC: 30 MMOL/L (ref 21–32)
CREAT SERPL-MCNC: 0.89 MG/DL (ref 0.55–1.02)
GLOBULIN SER CALC-MCNC: 3.3 G/DL (ref 2–4)
GLUCOSE SERPL-MCNC: 87 MG/DL (ref 65–100)
POTASSIUM SERPL-SCNC: 2.9 MMOL/L (ref 3.5–5.1)
PROT SERPL-MCNC: 5.8 G/DL (ref 6.4–8.2)
SODIUM SERPL-SCNC: 139 MMOL/L (ref 136–145)

## 2024-12-21 PROCEDURE — 80053 COMPREHEN METABOLIC PANEL: CPT

## 2024-12-21 PROCEDURE — 36415 COLL VENOUS BLD VENIPUNCTURE: CPT

## 2024-12-23 ENCOUNTER — HOSPITAL ENCOUNTER (OUTPATIENT)
Facility: HOSPITAL | Age: 88
Setting detail: SPECIMEN
Discharge: HOME | End: 2024-12-26

## 2024-12-23 LAB — POTASSIUM SERPL-SCNC: 3.3 MMOL/L (ref 3.5–5.1)

## 2024-12-23 PROCEDURE — 84132 ASSAY OF SERUM POTASSIUM: CPT

## 2024-12-25 ENCOUNTER — HOSPITAL ENCOUNTER (OUTPATIENT)
Facility: HOSPITAL | Age: 88
Setting detail: SPECIMEN
Discharge: HOME | End: 2024-12-28

## 2024-12-25 LAB
ANION GAP SERPL CALC-SCNC: 4 MMOL/L (ref 2–12)
BUN SERPL-MCNC: 19 MG/DL (ref 6–20)
BUN/CREAT SERPL: 17 (ref 12–20)
CA-I BLD-MCNC: 9.7 MG/DL (ref 8.5–10.1)
CHLORIDE SERPL-SCNC: 105 MMOL/L (ref 97–108)
CO2 SERPL-SCNC: 29 MMOL/L (ref 21–32)
CREAT SERPL-MCNC: 1.13 MG/DL (ref 0.55–1.02)
GLUCOSE SERPL-MCNC: 112 MG/DL (ref 65–100)
POTASSIUM SERPL-SCNC: 4.7 MMOL/L (ref 3.5–5.1)
SODIUM SERPL-SCNC: 138 MMOL/L (ref 136–145)

## 2024-12-25 PROCEDURE — 80048 BASIC METABOLIC PNL TOTAL CA: CPT

## 2024-12-26 ENCOUNTER — HOSPITAL ENCOUNTER (OUTPATIENT)
Facility: HOSPITAL | Age: 88
Setting detail: SPECIMEN
Discharge: HOME | End: 2024-12-29

## 2024-12-26 LAB — AMMONIA PLAS-SCNC: 27 UMOL/L

## 2024-12-26 PROCEDURE — 82140 ASSAY OF AMMONIA: CPT

## 2024-12-27 PROCEDURE — 81001 URINALYSIS AUTO W/SCOPE: CPT

## 2024-12-28 ENCOUNTER — HOSPITAL ENCOUNTER (OUTPATIENT)
Facility: HOSPITAL | Age: 88
Setting detail: SPECIMEN
Discharge: HOME | End: 2024-12-31

## 2024-12-28 LAB
APPEARANCE UR: CLEAR
BACTERIA URNS QL MICRO: NEGATIVE /HPF
BILIRUB UR QL: NEGATIVE
CAOX CRY URNS QL MICRO: ABNORMAL
COLOR UR: ABNORMAL
EPITH CASTS URNS QL MICRO: ABNORMAL /LPF
GLUCOSE UR STRIP.AUTO-MCNC: NEGATIVE MG/DL
HGB UR QL STRIP: NEGATIVE
HYALINE CASTS URNS QL MICRO: ABNORMAL /LPF (ref 0–5)
KETONES UR QL STRIP.AUTO: NEGATIVE MG/DL
LEUKOCYTE ESTERASE UR QL STRIP.AUTO: NEGATIVE
MUCOUS THREADS URNS QL MICRO: ABNORMAL /LPF
NITRITE UR QL STRIP.AUTO: NEGATIVE
PH UR STRIP: 5 (ref 5–8)
PROT UR STRIP-MCNC: NEGATIVE MG/DL
RBC #/AREA URNS HPF: ABNORMAL /HPF (ref 0–5)
SP GR UR REFRACTOMETRY: 1.02 (ref 1–1.03)
URINE CULTURE IF INDICATED: ABNORMAL
UROBILINOGEN UR QL STRIP.AUTO: 0.1 EU/DL (ref 0.1–1)
WBC URNS QL MICRO: ABNORMAL /HPF (ref 0–4)

## 2025-04-29 ENCOUNTER — APPOINTMENT (OUTPATIENT)
Facility: HOSPITAL | Age: 89
End: 2025-04-29
Payer: MEDICARE

## 2025-04-29 ENCOUNTER — HOSPITAL ENCOUNTER (EMERGENCY)
Facility: HOSPITAL | Age: 89
Discharge: ANOTHER HOSPITAL | End: 2025-04-29
Attending: EMERGENCY MEDICINE
Payer: MEDICARE

## 2025-04-29 VITALS
HEIGHT: 63 IN | SYSTOLIC BLOOD PRESSURE: 130 MMHG | RESPIRATION RATE: 23 BRPM | DIASTOLIC BLOOD PRESSURE: 45 MMHG | HEART RATE: 96 BPM | BODY MASS INDEX: 25.43 KG/M2 | WEIGHT: 143.5 LBS | OXYGEN SATURATION: 99 % | TEMPERATURE: 98.6 F

## 2025-04-29 DIAGNOSIS — R47.01 APHASIA: Primary | ICD-10-CM

## 2025-04-29 DIAGNOSIS — I60.9 SAH (SUBARACHNOID HEMORRHAGE) (HCC): ICD-10-CM

## 2025-04-29 DIAGNOSIS — S00.03XA HEMATOMA OF LEFT PARIETAL SCALP, INITIAL ENCOUNTER: ICD-10-CM

## 2025-04-29 DIAGNOSIS — S00.03XA SCALP HEMATOMA, INITIAL ENCOUNTER: ICD-10-CM

## 2025-04-29 DIAGNOSIS — S09.90XA INJURY OF HEAD, INITIAL ENCOUNTER: ICD-10-CM

## 2025-04-29 LAB
ALBUMIN SERPL-MCNC: 3.5 G/DL (ref 3.5–5)
ALBUMIN/GLOB SERPL: 1.2 (ref 1.1–2.2)
ALP SERPL-CCNC: 188 U/L (ref 45–117)
ALT SERPL-CCNC: 51 U/L (ref 12–78)
ANION GAP SERPL CALC-SCNC: 4 MMOL/L (ref 2–12)
AST SERPL W P-5'-P-CCNC: 42 U/L (ref 15–37)
BASOPHILS # BLD: 0.03 K/UL (ref 0–0.1)
BASOPHILS NFR BLD: 0.4 % (ref 0–1)
BILIRUB SERPL-MCNC: 0.3 MG/DL (ref 0.2–1)
BUN SERPL-MCNC: 29 MG/DL (ref 6–20)
BUN/CREAT SERPL: 29 (ref 12–20)
CA-I BLD-MCNC: 9.3 MG/DL (ref 8.5–10.1)
CHLORIDE SERPL-SCNC: 108 MMOL/L (ref 97–108)
CO2 SERPL-SCNC: 28 MMOL/L (ref 21–32)
CREAT SERPL-MCNC: 1.01 MG/DL (ref 0.55–1.02)
DIFFERENTIAL METHOD BLD: NORMAL
EKG ATRIAL RATE: 78 BPM
EKG DIAGNOSIS: NORMAL
EKG Q-T INTERVAL: 372 MS
EKG QRS DURATION: 74 MS
EKG QTC CALCULATION (BAZETT): 434 MS
EKG R AXIS: 30 DEGREES
EKG T AXIS: 34 DEGREES
EKG VENTRICULAR RATE: 82 BPM
EOSINOPHIL # BLD: 0.24 K/UL (ref 0–0.4)
EOSINOPHIL NFR BLD: 3 % (ref 0–7)
ERYTHROCYTE [DISTWIDTH] IN BLOOD BY AUTOMATED COUNT: 13.7 % (ref 11.5–14.5)
GLOBULIN SER CALC-MCNC: 3 G/DL (ref 2–4)
GLUCOSE SERPL-MCNC: 121 MG/DL (ref 65–100)
HCT VFR BLD AUTO: 35.6 % (ref 35–47)
HGB BLD-MCNC: 11.6 G/DL (ref 11.5–16)
IMM GRANULOCYTES # BLD AUTO: 0.04 K/UL (ref 0–0.04)
IMM GRANULOCYTES NFR BLD AUTO: 0.5 % (ref 0–0.5)
INR PPP: 1 (ref 0.9–1.1)
LYMPHOCYTES # BLD: 1.75 K/UL (ref 0.8–3.5)
LYMPHOCYTES NFR BLD: 22.2 % (ref 12–49)
MCH RBC QN AUTO: 30.1 PG (ref 26–34)
MCHC RBC AUTO-ENTMCNC: 32.6 G/DL (ref 30–36.5)
MCV RBC AUTO: 92.2 FL (ref 80–99)
MONOCYTES # BLD: 0.78 K/UL (ref 0–1)
MONOCYTES NFR BLD: 9.9 % (ref 5–13)
NEUTS SEG # BLD: 5.04 K/UL (ref 1.8–8)
NEUTS SEG NFR BLD: 64 % (ref 32–75)
NRBC # BLD: 0 K/UL (ref 0–0.01)
NRBC BLD-RTO: 0 PER 100 WBC
PLATELET # BLD AUTO: 226 K/UL (ref 150–400)
PMV BLD AUTO: 10 FL (ref 8.9–12.9)
POTASSIUM SERPL-SCNC: 4.5 MMOL/L (ref 3.5–5.1)
PROT SERPL-MCNC: 6.5 G/DL (ref 6.4–8.2)
PROTHROMBIN TIME: 13.1 SEC (ref 11.9–14.6)
RBC # BLD AUTO: 3.86 M/UL (ref 3.8–5.2)
SODIUM SERPL-SCNC: 140 MMOL/L (ref 136–145)
TROPONIN I SERPL HS-MCNC: 10 NG/L (ref 0–51)
WBC # BLD AUTO: 7.9 K/UL (ref 3.6–11)

## 2025-04-29 PROCEDURE — 96374 THER/PROPH/DIAG INJ IV PUSH: CPT

## 2025-04-29 PROCEDURE — 84484 ASSAY OF TROPONIN QUANT: CPT

## 2025-04-29 PROCEDURE — 36415 COLL VENOUS BLD VENIPUNCTURE: CPT

## 2025-04-29 PROCEDURE — 6360000002 HC RX W HCPCS: Performed by: EMERGENCY MEDICINE

## 2025-04-29 PROCEDURE — 99285 EMERGENCY DEPT VISIT HI MDM: CPT

## 2025-04-29 PROCEDURE — 71045 X-RAY EXAM CHEST 1 VIEW: CPT

## 2025-04-29 PROCEDURE — 85025 COMPLETE CBC W/AUTO DIFF WBC: CPT

## 2025-04-29 PROCEDURE — 72125 CT NECK SPINE W/O DYE: CPT

## 2025-04-29 PROCEDURE — 70450 CT HEAD/BRAIN W/O DYE: CPT

## 2025-04-29 PROCEDURE — 93005 ELECTROCARDIOGRAM TRACING: CPT | Performed by: EMERGENCY MEDICINE

## 2025-04-29 PROCEDURE — 80053 COMPREHEN METABOLIC PANEL: CPT

## 2025-04-29 PROCEDURE — 96375 TX/PRO/DX INJ NEW DRUG ADDON: CPT

## 2025-04-29 PROCEDURE — 85610 PROTHROMBIN TIME: CPT

## 2025-04-29 RX ORDER — HYDRALAZINE HYDROCHLORIDE 20 MG/ML
20 INJECTION INTRAMUSCULAR; INTRAVENOUS
Status: DISCONTINUED | OUTPATIENT
Start: 2025-04-29 | End: 2025-04-29

## 2025-04-29 RX ORDER — LEVETIRACETAM 500 MG/5ML
2000 INJECTION, SOLUTION, CONCENTRATE INTRAVENOUS
Status: COMPLETED | OUTPATIENT
Start: 2025-04-29 | End: 2025-04-29

## 2025-04-29 RX ORDER — LABETALOL HYDROCHLORIDE 5 MG/ML
20 INJECTION, SOLUTION INTRAVENOUS
Status: DISCONTINUED | OUTPATIENT
Start: 2025-04-29 | End: 2025-04-29 | Stop reason: HOSPADM

## 2025-04-29 RX ORDER — MIDAZOLAM HYDROCHLORIDE 2 MG/2ML
1 INJECTION, SOLUTION INTRAMUSCULAR; INTRAVENOUS ONCE
Status: COMPLETED | OUTPATIENT
Start: 2025-04-29 | End: 2025-04-29

## 2025-04-29 RX ADMIN — MIDAZOLAM 1 MG: 1 INJECTION INTRAMUSCULAR; INTRAVENOUS at 04:45

## 2025-04-29 RX ADMIN — LEVETIRACETAM 2000 MG: 100 INJECTION INTRAVENOUS at 04:43

## 2025-04-29 RX ADMIN — HYDRALAZINE HYDROCHLORIDE 20 MG: 20 INJECTION, SOLUTION INTRAMUSCULAR; INTRAVENOUS at 04:37

## 2025-04-29 ASSESSMENT — PAIN SCALES - WONG BAKER
WONGBAKER_NUMERICALRESPONSE: NO HURT
WONGBAKER_NUMERICALRESPONSE: NO HURT

## 2025-04-29 ASSESSMENT — LIFESTYLE VARIABLES
HOW MANY STANDARD DRINKS CONTAINING ALCOHOL DO YOU HAVE ON A TYPICAL DAY: PATIENT DOES NOT DRINK
HOW OFTEN DO YOU HAVE A DRINK CONTAINING ALCOHOL: NEVER

## 2025-04-29 NOTE — ED TRIAGE NOTES
PER EMS: Pt came from [facility] and had an unwitnessed GLF, -LOC. -BT. Pt is experiencing aphasia upon arrival to ED.     Staff report change in pt mentation status, they report pt fell on head and has positive head injury, R sided facial droop, flaccid R side and unable to speak.     LKW 0100      Level 2 Stroke alert called [id number]

## 2025-04-29 NOTE — ED NOTES
Right arm is contracted and left leg is stiff and not responding to movement. Tele-neuro online at this time; Dr. Scmhidt at bedside assessing pt.     Pt unable to follow commands.

## 2025-04-29 NOTE — ED PROVIDER NOTES
agreement and understanding for the need to be transferred and for their diagnosis. Consultation has been made with Dr Schmitz, who agrees to accept the transfer.      PATIENT REFERRED TO:  No follow-up provider specified.      DISCHARGE MEDICATIONS:     Medication List        ASK your doctor about these medications      atorvastatin 20 MG tablet  Commonly known as: LIPITOR     cyanocobalamin 1000 MCG tablet     escitalopram 10 MG tablet  Commonly known as: LEXAPRO     furosemide 40 MG tablet  Commonly known as: Lasix  Take 1 tablet by mouth daily     ipratropium 0.5 mg-albuterol 2.5 mg 0.5-2.5 (3) MG/3ML Soln nebulizer solution  Commonly known as: DUONEB  Inhale 3 mLs into the lungs every 4 hours as needed for Shortness of Breath     latanoprost 0.005 % ophthalmic solution  Commonly known as: XALATAN     levothyroxine 137 MCG tablet  Commonly known as: SYNTHROID     lisinopril 2.5 MG tablet  Commonly known as: PRINIVIL;ZESTRIL     QUEtiapine 25 MG tablet  Commonly known as: SEROQUEL     sodium bicarbonate 325 MG tablet  Take 1 tablet by mouth 2 times daily     timolol 0.5 % ophthalmic solution  Commonly known as: TIMOPTIC                DISCONTINUED MEDICATIONS:  Current Discharge Medication List          I am the Primary Clinician of Record. Violetta Schmidt MD (electronically signed)    (Please note that parts of this dictation were completed with voice recognition software. Quite often unanticipated grammatical, syntax, homophones, and other interpretive errors are inadvertently transcribed by the computer software. Please disregards these errors. Please excuse any errors that have escaped final proofreading.)        Violetta Schmidt MD  04/29/25 0454       Violetta Schmidt MD  04/29/25 0506

## 2025-04-29 NOTE — ED NOTES
Transfer center called and gave an ETA of 0505 for flight ETA. Nurse to nurse report number is [phone], option 3. Primary RN made aware.